# Patient Record
Sex: MALE | Race: WHITE
[De-identification: names, ages, dates, MRNs, and addresses within clinical notes are randomized per-mention and may not be internally consistent; named-entity substitution may affect disease eponyms.]

---

## 2017-03-19 ENCOUNTER — HOSPITAL ENCOUNTER (EMERGENCY)
Dept: HOSPITAL 25 - ED | Age: 82
Discharge: HOME | End: 2017-03-19
Payer: MEDICARE

## 2017-03-19 VITALS — SYSTOLIC BLOOD PRESSURE: 140 MMHG | DIASTOLIC BLOOD PRESSURE: 58 MMHG

## 2017-03-19 DIAGNOSIS — E78.5: ICD-10-CM

## 2017-03-19 DIAGNOSIS — S22.42XA: Primary | ICD-10-CM
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
## 2017-03-19 DIAGNOSIS — E03.9: ICD-10-CM

## 2017-03-19 DIAGNOSIS — R05: ICD-10-CM
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
## 2017-03-19 PROCEDURE — 96372 THER/PROPH/DIAG INJ SC/IM: CPT

## 2017-03-19 PROCEDURE — 99282 EMERGENCY DEPT VISIT SF MDM: CPT

## 2017-03-19 NOTE — ED
Jem GOMEZ Matthew, scribed for Golden Lopez MD on 03/19/17 at 1247 .





HPI Chest Pain





- HPI Summary


HPI Summary: 


An 84 y/o male presents to the ED with left lower rib pain. The pain is rated 8/

10 in severity with cough and 2/10 without movement. The pain is described as 

sharp. He states that his pain started after he had a coughing episode. 

Associated symptoms include mildly productive cough. The patient denies SOB, 

dizziness, and mid-sternal chest pain. No Hx of COPD or asthma. 

















- History of Current Complaint


Chief Complaint: EDChestWallPain


Time Seen by Provider: 03/19/17 12:38


Hx Obtained From: Patient


Onset/Duration: Atraumatic, Still Present


Initial Severity: Moderate


Current Severity: Moderate


Pain Intensity: 8 - w/ cough 


Pain Scale Used: 0-10 Numeric


Character: Sharp/Stabbing


Aggravating Factor(s): Movement, Other: - Cough


Alleviating Factor(s): Rest


Associated Signs and Symptoms: Positive: Cough - mildly productive.  Negative: 

Dizziness, Shortness of Breath





- Additional Pertinent History


Primary Care Physician: [id number]





- Allergy/Home Medications


Allergies/Adverse Reactions: 


 Allergies











Allergy/AdvReac Type Severity Reaction Status Date / Time


 


Iodinated Contrast Media AdvReac Unknown Headache Verified 01/20/16 17:49





[IV CONTRAST DYE]     


 


Statins AdvReac Unknown Muscle Pain Verified 01/20/16 17:49














PMH/Surg Hx/FS Hx/Imm Hx


Endocrine/Hematology History: Reports: Hx Anticoagulant Therapy, Hx Thyroid 

Disease - hypothyroidism, Other Endocrine/Hematological Disorders - dyslipidemia


   Denies: Hx Diabetes


Cardiovascular History: Reports: Hx Cardiac Arrest, Hx Coronary Artery Disease, 

Hx Hypercholesterolemia, Hx Hypertension, Hx Peripheral Vascular Disease, Hx 

Valvular Heart Disease - moderate aortic stenosis, Other Cardiovascular Problems

/Disorders - two stents in 1999


   Denies: Hx Pacemaker/ICD


Respiratory History: Reports: Hx Seasonal Allergies


   Denies: Hx Chronic Obstructive Pulmonary Disease (COPD)


   Comment Only: Hx Asthma - adolescent


GI History: Reports: Hx Gastroesophageal Reflux Disease, Other GI Disorders - 

colitis


 History: 


   Denies: Hx Dialysis


Musculoskeletal History: Reports: Hx Arthritis, Hx Back Problems, Hx Orthopedic 

Injury, Hx Osteoporosis, Other Musculoskeletal History


Sensory History: Reports: Hx Contacts or Glasses


   Denies: Hx Cataracts, Hx Eye Injury, Hx Eye Prosthesis, Hx Glaucoma, Hx 

Macular Degeneration, Hx Vision Problem, Hx Deafness, Hx Hearing Aid, Hx 

Hearing Problem


Opthamlomology History: Reports: Hx Contacts or Glasses


   Denies: Hx Cataracts, Hx Eye Injury, Hx Eye Prosthesis, Hx Glaucoma, Hx 

Macular Degeneration, Hx Vision Problem


Neurological History: 


   Denies: Hx Dementia, Hx Seizures


Psychiatric History: 


   Denies: Hx Panic Disorder





- Cancer History


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 

in 1989





- Surgical History


Surgery Procedure, Year, and Place: BILAT SHOULDERS(WITH SCREWS), RT HIP 

REPLACEMENT 11/9/12, CARDIAC STENTS X 2 1999(report in pt's chart - other 

facilty reports - MULTILINK DUET - COND 5 UP TO 3T) , DOUBLE BYPASS 2005, 

TRIGGER FINGERS RIGHT AND LEFT , APPENDECTOMY, bilateral carpal tunnel releases,

CATARACT


Hx Anesthesia Reactions: No





- Immunization History


Date of Tetanus Vaccine: Up to date


Date of Influenza Vaccine: Fall 2014


Infectious Disease History: No


Infectious Disease History: 


   Denies: Traveled Outside the [place] in Last 30 Days





- Family History


Known Family History: Positive: Other - colon cancer, and perforated bowel.





- Social History


Alcohol Use: Daily


Alcohol Amount: 1/2 glass wine with supper


Substance Use Type: Reports: None


Smoking Status (MU): Never Smoked Tobacco





Review of Systems


Constitutional: Negative


Eyes: Negative


ENT: Negative


Positive: Chest Pain - left lower ribs; NO mid sternal chest pain


Positive: Cough - mildly productive .  Negative: Shortness Of Breath


Gastrointestinal: Negative


Genitourinary: Negative


Musculoskeletal: Negative


Skin: Negative


Neurological: Negative - NO DIZZINESS


Psychological: Normal


All Other Systems Reviewed And Are Negative: Yes





Physical Exam





- Summary


Physical Exam Summary: 





VITAL SIGNS: Reviewed. 


GENERAL: Patient is a well developed and nourished male who is lying 

comfortable in the stretcher. Patient is not in any acute respiratory distress. 


HEAD AND FACE: No signs of trauma. No ecchymosis, hematomas or skull 

depressions.  


EYES: PERRLA, EOMI x 2, 


EARS: Hearing grossly intact. 


MOUTH: Oropharynx within normal limits. 


NECK: Supple, trachea is midline, no adenopathy, no JVD, no carotid bruit


CHEST: Symmetric, Positive tenderness at palpation at the level of 7th and 8th 

rib mid axillary area. 


LUNGS: Clear to auscultation bilaterally. No wheezing or crackles.


CVS: Regular rate and rhythm, S1 and S2 present, no murmurs or gallops 

appreciated. 


ABDOMEN: Soft, non-tender. No signs of distention. No rebound no guarding, and 

no masses palpated. Bowel sounds are normal. 


EXTREMITIES: FROM in all major joints, no edema, no cyanosis or clubbing.


NEURO: Alert and oriented x 3. No acute neurological deficits. Speech is normal 

and follows commands. 


SKIN: Dry and warm








Triage Information Reviewed: Yes


Vital Signs On Initial Exam: 


 Initial Vitals











Temp Pulse Resp BP Pulse Ox


 


 98.5 F   60   16   145/58   100 


 


 03/19/17 12:25  03/19/17 12:25  03/19/17 12:25  03/19/17 12:25  03/19/17 12:25











Vital Signs Reviewed: Yes





Diagnostics





- Vital Signs


 Vital Signs











  Temp Pulse Resp BP Pulse Ox


 


 03/19/17 12:25  98.5 F  60  16  145/58  100














- Laboratory


Lab Statement: Any lab studies that have been ordered have been reviewed, and 

results considered in the medical decision making process.





- Radiology


  ** Rib/Chest XR


Xray Interpretation: Positive (See Comments) - IMPRESSION: Subtle contour 

irregularities at the lateral LEFT fourth , sixth, and seventh ribs noted which 

may reflect nondisplaced fractures. No callus formation evident to favor 

healing or chronic fractures. Negative for pneumothorax.


Radiology Interpretation Completed By: Radiologist





Chest Pain Course/Dx





- Course


Assessment/Plan: An 84 y/o male presents to the ED with left lower rib pain. 

The pain is rated 8/10 in severity with cough and 2/10 without movement. The 

pain is described as sharp. He states that his pain started after he had a 

coughing episode. Associated symptoms include mildly productive cough. The 

patient denies SOB, dizziness, and mid-sternal chest pain. No Hx of COPD or 

asthma. XR of the ribs shows non displaced fracture of 4,6,and 7th ribs. No 

evidence of pneumothorax. In the ED course, the patient was given one Percocet 

and Toradol and his symptoms improved. The patient denies any prescriptions for 

narcotics. He only wants to take ibuprofen and Tylenol for the pain. I dont 

believe the patient has ACS since the patient doesnt have chest pain, SOB, 

nausea, vomiting, or diaphoresis. I believe his symptoms are secondary to the 

rib fractures. Hes hemodynamically stable and A&Ox3. He will be given an 

incentive spirometer.  The patient and his wife are confortable going home.  I 

discussed all the findings and test results with the patient. Patient was 

instructed to return to the emergency room immediately if any of the symptoms 

return or worsens. Plan of care was discussed with the patient and understands 

and agrees. All questions were answered at patient satisfaction.  There were no 

further complaints or concerns.  Lung exam before discharge: CTA B/L. Good air 

exchange. No wheezing or crackles heard. CVS: S1 and S2 present. No murmurs 

appreciated. Patient is alert and oriented x 3. Patient is hemodynamically 

stable. Patient will be discharged home with follow up pediatrician in the next 

2-3 days





- Chest Pain


Differential Diagnosis/HQI/PQRI: Chest Wall, Other: - Rib fracture, rib 

contusion





- Diagnoses


Provider Diagnoses: 


 Rib fractures








Discharge





- Discharge Plan


Condition: Stable


Disposition: HOME


Patient Education Materials:  Rib Fracture (ED)


Referrals: 


Tab Sadler MD [Primary Care Provider] - 3 Days


Additional Instructions: 


Please follow-up with your primary care physician. 





The documentation as recorded by the Jem jacobs Matthew accurately 

reflects the service I personally performed and the decisions made by me, Golden Lopez MD.

## 2017-03-19 NOTE — RAD
Indication: Cough for weeks. Shortness of breath. LEFT low lateral rib cage pain.



Comparison: December 26, 2016



Technique: 4 view LEFT rib series.



Report: Subtle contour irregularities at the lateral LEFT fourth , sixth, and seventh ribs

noted which may reflect nondisplaced fractures. No callus formation evident to favor

healing or chronic fractures. No alveolar consolidation to indicate pneumonia. Negative

for pleural effusion or pneumothorax. Median sternotomy wires and mediastinal vascular

clips. Upper normal heart size. Unremarkable central pulmonary vasculature.



IMPRESSION: Subtle contour irregularities at the lateral LEFT fourth , sixth, and seventh

ribs noted which may reflect nondisplaced fractures. No callus formation evident to favor

healing or chronic fractures. Negative for pneumothorax.

## 2017-03-31 ENCOUNTER — HOSPITAL ENCOUNTER (EMERGENCY)
Dept: HOSPITAL 25 - ED | Age: 82
Discharge: HOME | End: 2017-03-31
Payer: MEDICARE

## 2017-03-31 VITALS — SYSTOLIC BLOOD PRESSURE: 128 MMHG | DIASTOLIC BLOOD PRESSURE: 72 MMHG

## 2017-03-31 DIAGNOSIS — Z79.01: ICD-10-CM
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
## 2017-03-31 DIAGNOSIS — S62.600B: Primary | ICD-10-CM
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
## 2017-03-31 PROCEDURE — 99282 EMERGENCY DEPT VISIT SF MDM: CPT

## 2017-03-31 PROCEDURE — 73140 X-RAY EXAM OF FINGER(S): CPT

## 2017-03-31 PROCEDURE — 90471 IMMUNIZATION ADMIN: CPT

## 2017-03-31 PROCEDURE — 90715 TDAP VACCINE 7 YRS/> IM: CPT

## 2017-03-31 NOTE — ED
Upper Extremity Pain





- HPI Summary


HPI Summary: 


Patient presents after his right small finger came into contact with a metal 

 he was working with. He had immediate pain and bleeding. He applied 

direct pressure to control bleeding. His tetanus is not up to date. He has 

sensation in the digit. He is right handed. He is on blood thinners.





- History of Current Complaint


Hx Obtained From: Patient


Mechanism Of Injury: Unknown - metal circular 


Onset/Duration: Started Minutes Ago


Timing: Constant


Severity Initially: Severe


Severity Currently: Severe


Pain Location: Finger - right small


Character: Sharp, Aching


Aggravating Factor(s): Movement


Alleviating Factor(s): Nothing


Associated Signs & Symptoms: Positive: Swelling


Related History: Dominant Hand Right





<Herman Blank - Last Filed: 03/31/17 15:07>





<Janie Arzola - Last Filed: 03/31/17 15:21>





- History of Current Complaint


Chief Complaint: EDExtremityUpper


Stated Complaint: HURT PINKY FINGER


Time Seen by Provider: 03/31/17 12:22





- Allergies/Home Medications


Allergies/Adverse Reactions: 


 Allergies











Allergy/AdvReac Type Severity Reaction Status Date / Time


 


Iodinated Contrast Media AdvReac Unknown Headache Verified 01/20/16 17:49





[IV CONTRAST DYE]     


 


Statins AdvReac Unknown Muscle Pain Verified 01/20/16 17:49














PMH/Surg Hx/FS Hx/Imm Hx


Endocrine/Hematology History: Reports: Hx Anticoagulant Therapy, Hx Thyroid 

Disease - hypothyroidism, Other Endocrine/Hematological Disorders - dyslipidemia


   Denies: Hx Diabetes


Cardiovascular History: Reports: Hx Cardiac Arrest, Hx Coronary Artery Disease, 

Hx Hypercholesterolemia, Hx Hypertension, Hx Peripheral Vascular Disease, Hx 

Valvular Heart Disease - moderate aortic stenosis, Other Cardiovascular Problems

/Disorders - two stents in 1999


   Denies: Hx Pacemaker/ICD


Respiratory History: Reports: Hx Seasonal Allergies


   Denies: Hx Chronic Obstructive Pulmonary Disease (COPD)


   Comment Only: Hx Asthma - adolescent


GI History: Reports: Hx Gastroesophageal Reflux Disease, Other GI Disorders - 

colitis


 History: 


   Denies: Hx Dialysis


Musculoskeletal History: Reports: Hx Arthritis, Hx Back Problems, Hx Orthopedic 

Injury, Hx Osteoporosis, Other Musculoskeletal History


Sensory History: Reports: Hx Contacts or Glasses


   Denies: Hx Cataracts, Hx Eye Injury, Hx Eye Prosthesis, Hx Glaucoma, Hx 

Macular Degeneration, Hx Vision Problem, Hx Deafness, Hx Hearing Aid, Hx 

Hearing Problem


Opthamlomology History: Reports: Hx Contacts or Glasses


   Denies: Hx Cataracts, Hx Eye Injury, Hx Eye Prosthesis, Hx Glaucoma, Hx 

Macular Degeneration, Hx Vision Problem


Neurological History: 


   Denies: Hx Dementia, Hx Seizures


Psychiatric History: 


   Denies: Hx Panic Disorder





- Cancer History


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 

in 1989





- Surgical History


Surgery Procedure, Year, and Place: BILAT SHOULDERS(WITH SCREWS), RT HIP 

REPLACEMENT 11/9/12, CARDIAC STENTS X 2 1999(report in pt's chart - other 

facilty reports - MULTILINK DUET - COND 5 UP TO 3T) , DOUBLE BYPASS 2005, 

TRIGGER FINGERS RIGHT AND LEFT , APPENDECTOMY, bilateral carpal tunnel releases,

CATARACT


Hx Anesthesia Reactions: No





- Immunization History


Date of Tetanus Vaccine: Up to date


Date of Influenza Vaccine: Fall 2014


Infectious Disease History: 


   Denies: Traveled Outside the [place] in Last 30 Days





- Family History


Known Family History: Positive: Other - colon cancer, and perforated bowel.





- Social History


Occupation: Retired


Lives: With Family


Alcohol Use: Daily


Alcohol Amount: 1/2 glass wine with supper


Substance Use Type: Reports: None


Smoking Status (MU): Never Smoked Tobacco





<Herman Blank - Last Filed: 03/31/17 15:07>





Review of Systems


Positive: Myalgia, Decreased ROM, Edema - mild


Negative: Weakness, Paresthesia, Numbness


All Other Systems Reviewed And Are Negative: Yes





<Herman Blank - Last Filed: 03/31/17 15:07>





Physical Exam


Triage Information Reviewed: Yes


Vital Signs On Initial Exam: 


 Initial Vitals











Temp Pulse Resp BP Pulse Ox


 


 97.4 F   81   16   141/98   100 


 


 03/31/17 12:17  03/31/17 12:17  03/31/17 12:17  03/31/17 12:17  03/31/17 12:17











Vital Signs Reviewed: Yes


Appearance: Positive: Well-Appearing, Well-Nourished, Pain Distress


Skin: Positive: Warm, Skin Color Reflects Adequate Perfusion, Dry, Tender - 

dime sized area of avulsed tissue with 3mm indention to dorsal aspect of distal 

right 5th phalanx at the base of the nail, Soft


Head/Face: Positive: Normal Head/Face Inspection


Eyes: Positive: EOMI, JAYSON, Conjunctiva Clear


ENT: Positive: Hearing grossly normal


Respiratory/Lung Sounds: Positive: Breath Sounds Present


Cardiovascular: Positive: RRR


Musculoskeletal: Positive: Limited @ - right small digit DIP joint limited due 

to pain, Pain @ - dime sized area of avulsed tissue with 3mm indention to 

dorsal aspect of distal right 5th phalanx at the base of the nail, Edema Right


Neurological: Positive: Sensory/Motor Intact, Alert, Oriented to Person Place, 

Time, NV Bundle Intact Distally


Psychiatric: Positive: Affect/Mood Appropriate


AVPU Assessment: Alert





<Herman Blank - Last Filed: 03/31/17 15:07>


Vital Signs On Initial Exam: 


 Initial Vitals











Temp Pulse Resp BP Pulse Ox


 


 97.4 F   81   16   141/98   100 


 


 03/31/17 12:17  03/31/17 12:17  03/31/17 12:17  03/31/17 12:17  03/31/17 12:17














<Janie Arzola - Last Filed: 03/31/17 15:21>





Procedures





- Splinting


Location: right small finger


Pre-Made Type: metal - foam splint


Splint: volar


Pre-Proc Neuro Vasc Exam: normal


Post-Proc Neuro Vasc Exam: normal





<Herman Blank - Last Filed: 03/31/17 15:07>





Diagnostics





- Vital Signs


 Vital Signs











  Temp Pulse Resp BP Pulse Ox


 


 03/31/17 12:17  97.4 F  81  16  141/98  100














- Radiology


  ** No standard instances


Xray Interpretation: Positive (See Comments)


Radiology Interpretation Completed By: Radiologist - right 5th digit distal 

phalanx with fracture pattern





<Herman Blank - Last Filed: 03/31/17 15:07>





- Vital Signs


 Vital Signs











  Temp Pulse Resp BP Pulse Ox


 


 03/31/17 14:51  97.8 F  78  16  148/81  97


 


 03/31/17 12:17  97.4 F  81  16  141/98  100














<Janie Arzola - Last Filed: 03/31/17 15:21>





Course/Dx





- Course


Course Of Treatment: Patient wound was well irrigated





- Diagnoses


Differential Diagnosis/HQI/PQRI: Positive: Arthritis, Bursitis, Fracture (Open)

, Fracture (Closed), Hematoma, Laceration, Strain, Sprain





- Physician Notifications


Discussed Care Of Patient With: Dr. Arzola, ED attending; Dr. Dan, 

orthopedic surgeon.





<Herman Blank - Last Filed: 03/31/17 15:07>





<Janie Arzola - Last Filed: 03/31/17 15:21>





- Diagnoses


Provider Diagnoses: 


 Open fracture of phalanx of right index finger








Discharge





<Herman Blank - Last Filed: 03/31/17 15:07>





<Janie Arzola - Last Filed: 03/31/17 15:21>





- Discharge Plan


Condition: Stable


Disposition: HOME


Prescriptions: 


Cephalexin CAP* [Keflex CAP*] 500 mg PO QID #39 cap


Patient Education Materials:  Finger Fracture (ED)


Referrals: 


Tab Sadler MD [Primary Care Provider] - 


Cris Dan MD [Medical Doctor] - 


Additional Instructions: 


Please take the antibiotics prescribed until they are completely gone. Use 

Tylenol for pain. Keep your splint clean, dry and in place at all times. 

Elevate your hand above your heart to reduce swelling. 


Call Dr. Dan's office today for an appointment Monday to be seen for 

evaluation. Return to the emergency department if symptoms worsen.


Be aware that antibiotics can make your Warfarin have more of an affect, so you 

need to speak with the provider who regulates your dosing so they are aware of 

any needed changes.





Attestations


User Type: Provider - I was available for consult. This patient was seen by the 

XIN. The patient was not presented to, seen by, or examined by me. Ernestoj





<Janie Arzola - Last Filed: 03/31/17 15:21>

## 2017-03-31 NOTE — RAD
INDICATION: Crush injury right fifth digit     



COMPARISON: None



TECHNIQUE: AP, lateral, and oblique views were obtained.



FINDINGS: There is fracture/deformity of the distal phalanx with associated soft tissue

injury. No other fractures are evident. There is underlying degenerative arthropathy.



IMPRESSION: FRACTURE/DEFORMITY OF THE DISTAL PHALANX WITH ASSOCIATED SOFT TISSUE INJURY.

## 2017-06-12 NOTE — HP
CC:  Tab Sadler MD; Jaquelin Prado MD *

 

ADMISSION HISTORY AND PHYSICAL:

 

DATE OF ADMISSION:  06/14/17

 

SURGEON:  Dr. Murray (DICTATED BY CLAUDE JACKSON)



CHIEF COMPLAINT:  Left inguinal hernia.

 

HISTORY OF PRESENT ILLNESS:  This is an 83-year-old male who is referred for 
evaluation of a left inguinal hernia.  This was a finding on recent MRI of the 
left hip from 05/22/17.  That study showed a direct left inguinal hernia 
measuring up to 3.9 x 4.1 x 10.5 cm hernia containing a loop of nonobstructed 
sigmoid colon, as well as the additional orthopedic findings (see separate 
report).  The patient states that he had not really noticed any swelling in the 
groin but had noticed of late some increased difficulty with his bowels, i.e., 
smaller rounded stools and at times requiring manual pressure in the left 
groin.  He denies any symptoms to suggest incarceration or strangulation.  He 
denies any recent changes in genitourinary symptoms.  He was seen in the office 
by Dr. Murray on 05/25/17, at which time, examination confirmed the presence of 
an obvious left inguinal bulge that was reducible and nontender.  There was no 
hernia noted on the right.  The testes were otherwise normal.  Dr. Murray 
discussed with him the indications for surgery, the risks, benefits and 
alternatives.  Initially, the patient had been scheduled as a laparoscopic 
approach, but because of his attendant medical issues, it was felt best to use 
an open approach under local anesthesia with IV sedation. The patient is 
agreeable.  Also, of note, he had not been instructed to stop his Coumadin 5 
days before surgery.  Therefore, he will be given a single dose of vitamin K 5 
mg p.o. today, 06/12/17, with INR check early the morning of surgery, 06/14/17.

 

PAST MEDICAL HISTORY:  Coronary artery disease (MI in 1999) with PCA and 2 
stent placements at that time, then subsequent 2-vessel CABG in 2005.  (See 
below for review of systems).  Moderate aortic stenosis, peripheral vascular 
disease (moderate carotid artery stenosis), history of ischemic colitis (many 
years ago with no surgical intervention required and no recent problems reported
), GERD, chronic pain largely related to his left hip and left knee.  He has 
avascular necrosis of the left femoral head and is anticipating a possible left 
hip replacement in the near future, though possibly also preceded by left knee 
replacement.  He also suffers from insomnia and vertigo.  He is also treated 
for hypothyroidism.  Antiphospholipid antibody syndrome (the patient states 
that this is the indication for his chronic anticoagulation).

 

PAST SURGICAL HISTORY:  Open appendectomy, bilateral rotator cuff repair, 2-
vessel CABG (see above), right total hip arthroplasty 2012, umbilical hernia 
repair, bilateral carpal tunnel release, trigger finger release.  No reported 
surgical or anesthesia complications.

 

CURRENT MEDICATIONS:

1.  Vitamin K 5 mg single dose on 06/12/17.

2.  Coumadin 5 mg one tablet alternating with one half tablet daily (last dose 
2.5 mg on 06/11/17).

3.  Temazepam 15 mg one half or one tablet at h.s. p.r.n. insomnia (has been 
using one half tablet routinely at h.s.).

4.  Colestid 1 g 3 tablets p.o. b.i.d.

5.  Fenofibrate 145 mg daily.

6.  Fish oil 1000 mg 2 tablets b.i.d.

7.  Lasix 20 mg daily.

8.  Neurontin 300 mg t.i.d.

9.  Omeprazole 20 mg daily.

10.  Meclizine 25 mg 4 times a day p.r.n. (uses 4 times a day routinely).

11.  Levothyroxine 125 mcg daily.

12.  Guaifenesin 400 mg 2 tablets b.i.d.

13.  Coenzyme Q10 100 mg daily.

14.  Vitamin C 500 mg 4 tablets b.i.d.

15.  MiraLax 17 g daily.

16.  Vitamin B12 1000 mcg daily.

17.  Biotin 5000 mcg daily.

18.  Vitamin D3 5000 IU daily.

19.  Melatonin 10 mg q.h.s.

20.  Multivitamin daily.

21.  Amoxicillin 500 mg 4 tablets 1 hour prior to dental work.

22.  Ibuprofen 200 mg daily.

23.  Voltaren topical gel t.i.d. p.r.n.

24.  Oxycodone liquid concentrate 20 mg/mL 0.25 mL 4 times a day p.r.n. (has 
only used a few times).

25.  He is also just finishing a 10-day course of penicillin 250 mg 4 times a 
day for dental abscess.

 

ALLERGIES:  NIACIN (flushing), ZOCOR (elevated CPK), BETA-BLOCKERS (bradycardia)
, IV CONTRAST DYE (headaches).

 

FAMILY HISTORY:  No family history of anesthesia problems, bleeding or clotting 
disorders.

 

SOCIAL HISTORY:  The patient is .  His wife has advanced Alzheimer's 
disease.  They do have a home health aide daily.  He also presents today with 
his daughter who is supportive.  He denies tobacco use.  He drinks one half 
glass of wine daily.  He denies other recreational drug use.

 

REVIEW OF SYSTEMS:  General:  No recent constitutional symptoms or acute 
illnesses other than described in the HPI.  HEENT:  No changes in vision.  He 
is status post bilateral cataract extraction.  He does have upper and lower 
permanent bridges. Cardiovascular:  As above.  See also Dr. Prado's note from 
06/08/17.  Most recent echocardiogram from 

12/23/16 shows EF of 55% to 60%, diastolic dysfunction and moderate aortic 
stenosis.  Most recent stress test from 

12/27/16 showed a fixed inferior wall defect and ejection fraction of 55% and 
no evidence for ischemia. Respiratory:  No shortness of breath, chronic cough.  
GI:  No additional problems reported other than per the HPI.  He has undergone 
colonoscopy, but last study was about 10 years ago with no recommended 
followup.  :  Nocturia x2.  Otherwise, no new or acute symptoms.  Endocrine:  
He is treated for hypothyroidism.  No history of diabetes.  Musculoskeletal:  
As above.  No additions.

 

                               PHYSICAL EXAMINATION

 

GENERAL:  Well-nourished elderly male, in no acute distress.  He ambulates with 
2 canes, but is able to manage to get up and down from the exam table.

 

VITAL SIGNS:  Height 66.5 inches, weight 176 pounds.  Blood pressure 120/70, 
pulse 74, respirations 16.

 

HEENT:  Pupils are equal, round, and reactive.  EOMs intact.  No conjunctival 
pallor.  Oropharynx:  Teeth in good repair.  No intraoral lesions.

 

NECK:  No thyromegaly or masses.  No cervical or supraclavicular 
lymphadenopathy.

 

LUNGS:  Clear to auscultation.  No rales or wheezes.

 

HEART:  Regular rate and rhythm.  He does have a systolic murmur heard best at 
the right second interspace consistent with known AS.

 

ABDOMEN:  Well-healed surgical scars.  Soft.  Nontender to palpation.  No 
palpable masses or organomegaly with the exception of the left inguinal hernia 
as noted above per Dr. Murray's exam.

 

GENITALIA:  Not repeated.

 

RECTAL:  Not done.

 

BACK:  No spinous process or CVA tenderness.

 

EXTREMITIES:  Trace edema, right slightly greater than left.

 

NEUROLOGICAL:  Grossly intact.

 

SKIN:  Warm and dry.  No suspicious rashes or lesions.

 

 IMPRESSION:  Left inguinal hernia.

 

PLAN:  Open repair, left inguinal hernia with mesh.  Please see above for 
specifics regarding his Coumadin regimen, vitamin K and INR for the morning of 
surgery.

 

 ____________________________________ CLAUDE JACKSON

 

[id number]/[facility] #: [id number]

[facility]

## 2017-06-14 ENCOUNTER — HOSPITAL ENCOUNTER (OUTPATIENT)
Dept: HOSPITAL 25 - OR | Age: 82
Discharge: HOME | End: 2017-06-14
Attending: SURGERY
Payer: MEDICARE

## 2017-06-14 VITALS — SYSTOLIC BLOOD PRESSURE: 140 MMHG | DIASTOLIC BLOOD PRESSURE: 67 MMHG

## 2017-06-14 DIAGNOSIS — Z79.01: ICD-10-CM

## 2017-06-14 DIAGNOSIS — K40.90: Primary | ICD-10-CM

## 2017-06-14 DIAGNOSIS — I34.8: ICD-10-CM

## 2017-06-14 DIAGNOSIS — E03.9: ICD-10-CM

## 2017-06-14 DIAGNOSIS — Z95.1: ICD-10-CM

## 2017-06-14 DIAGNOSIS — I25.10: ICD-10-CM

## 2017-06-14 DIAGNOSIS — I35.0: ICD-10-CM

## 2017-06-14 PROCEDURE — C1781 MESH (IMPLANTABLE): HCPCS

## 2017-06-14 RX ADMIN — FENTANYL CITRATE PRN MCG: 0.05 INJECTION, SOLUTION INTRAMUSCULAR; INTRAVENOUS at 14:38

## 2017-06-14 RX ADMIN — FENTANYL CITRATE PRN MCG: 0.05 INJECTION, SOLUTION INTRAMUSCULAR; INTRAVENOUS at 14:31

## 2017-06-14 NOTE — SURGPN
Brief Operative Note





- Surgery


Procedures: 


Procedures





Pre-OP Diagnoses:   Left inguinal hernia





Post-op Diagnosis:   same





Procedure:      open Left inguinal hernia repair with mesh





Surgeon:       Trevor





Asst:       Joseph





Anethesia:       Local MAC  Dr Cook





EBL:      minimal





IVF:      600cc crystalloid





Specimen:      none





Drains:      none

## 2017-06-15 NOTE — OP
CC:  Dr. Tab Sadler; Dr. Jaquelin Prado; [facility] *

 

DATE OF OPERATION:  06/14/17 - [facility]

 

DATE OF BIRTH:  [birth year]

 

SURGEON:  Mick Murray MD

 

ASSISTANT:  CLAUDE Hurt

 

ANESTHESIOLOGIST:  Dr. Cook.

 

ANESTHESIA:  General anesthesia with LMA.

 

PRE-OP DIAGNOSIS:  Left inguinal hernia.

 

POST-OP DIAGNOSIS:  Left inguinal hernia.

 

OPERATIVE PROCEDURE:  Open left inguinal hernia repair with mesh.

 

ESTIMATED BLOOD LOSS:  Minimal blood loss.

 

IV FLUIDS:  600 cc of crystalloid fluid given.

 

SPECIMEN:  None.

 

DRAINS:  None.

 

DESCRIPTION OF PROCEDURE:  The patient was identified in the preoperative area, 
case discussed.  I outlined the details of an open inguinal hernia repair as 
outlined by CLAUDE Hurt.  I discussed both laparoscopic and open in 
the office prior, but the patient was felt to be high risk for general and 
endotracheal intubation and decision was made to do an open procedure.  The 
patient discussed with the anesthesiologist, and the decision was made for 
general anesthesia after review of his chart.  The patient was marked, he 
signed consent, he was taken back to the operating room, placed on the 
operating table in the supine position. Preoperative antibiotics were given.  
Sequential devices were placed on bilateral lower extremities.  The patient's 
abdomen was clipped of hair.  Right and left groin was prepped and draped in 
standard surgical fashion.  A time-out was performed.

 

An inguinal incision was made.  This was deepened down through Genie's and 
Camper's fascia right down to the external oblique aponeurosis.  This was then 
incised and flaps were made both cephalad and caudad and the spermatic cord 
isolated.  A large hernia was identified, this was freed up from the cord and 
noted to be a direct hernia.  It was easily reduced and a pocket was made to 
accomplish a Prolene Hernia System mesh.  Blunt dissection with electrocautery 
was utilized in the preperitoneal space to make an area for the mesh to unfurl.
  The spermatic cord was isolated and there was no identification of an 
indirect hernia sac.  The spermatic structures were skeletonized and no hernia 
sac was appreciated but a small lipoma and this was reduced and ligated.

 

Extended Prolene Hernia System was opened up.  It was placed in the 
preperitoneal space _____, allowed to unfurl and open up and cover the full 
space without wrinkling.  The outer leaflet was then sutured to pubic tubercle 
as well as inferiorly to the shelving edge of the inguinal ligament.  A slit 
was made in the mesh to recreate this external ______ and the mesh tail was 
tucked under the external oblique aponeurosis.  It covered full hernia defect 
and with additional 2-0 Polysorb sutures, it was tacked into place.  The 
external oblique aponeurosis was then reapproximated with running 2-0 Polysorb 
suture.  Hemostasis was excellent. The Genie's fascia was closed followed by 
skin incision with 3-0 Polysorb sutures followed by 4-0 Monocryl subcuticular 
suture.  Steri-Strips and sterile dressing were applied.  The patient tolerated 
the procedure well, was woken up, and transferred to the PACU.

 

 [id number]/[facility] #: [id number]

[facility]

## 2018-04-20 ENCOUNTER — HOSPITAL ENCOUNTER (OUTPATIENT)
Dept: HOSPITAL 25 - ED | Age: 83
Setting detail: OBSERVATION
LOS: 1 days | Discharge: HOME | End: 2018-04-21
Attending: HOSPITALIST | Admitting: INTERNAL MEDICINE
Payer: MEDICARE

## 2018-04-20 DIAGNOSIS — I50.30: ICD-10-CM

## 2018-04-20 DIAGNOSIS — D68.61: ICD-10-CM

## 2018-04-20 DIAGNOSIS — I25.2: ICD-10-CM

## 2018-04-20 DIAGNOSIS — E78.5: ICD-10-CM

## 2018-04-20 DIAGNOSIS — E03.9: ICD-10-CM

## 2018-04-20 DIAGNOSIS — Z79.899: ICD-10-CM

## 2018-04-20 DIAGNOSIS — I35.0: ICD-10-CM

## 2018-04-20 DIAGNOSIS — Z88.8: ICD-10-CM

## 2018-04-20 DIAGNOSIS — M54.9: ICD-10-CM

## 2018-04-20 DIAGNOSIS — R79.1: ICD-10-CM

## 2018-04-20 DIAGNOSIS — Z79.01: ICD-10-CM

## 2018-04-20 DIAGNOSIS — I25.10: ICD-10-CM

## 2018-04-20 DIAGNOSIS — R00.1: ICD-10-CM

## 2018-04-20 DIAGNOSIS — K21.9: ICD-10-CM

## 2018-04-20 DIAGNOSIS — I11.0: ICD-10-CM

## 2018-04-20 DIAGNOSIS — I73.9: ICD-10-CM

## 2018-04-20 DIAGNOSIS — G45.9: Primary | ICD-10-CM

## 2018-04-20 DIAGNOSIS — R94.31: ICD-10-CM

## 2018-04-20 LAB
BASOPHILS # BLD AUTO: 0 10^3/UL (ref 0–0.2)
EOSINOPHIL # BLD AUTO: 0.5 10^3/UL (ref 0–0.6)
HCT VFR BLD AUTO: 43 % (ref 42–52)
HGB BLD-MCNC: 14.3 G/DL (ref 14–18)
INR PPP/BLD: 1.89 (ref 0.77–1.02)
LYMPHOCYTES # BLD AUTO: 1.3 10^3/UL (ref 1–4.8)
MCH RBC QN AUTO: 29 PG (ref 27–31)
MCHC RBC AUTO-ENTMCNC: 33 G/DL (ref 31–36)
MCV RBC AUTO: 86 FL (ref 80–94)
MONOCYTES # BLD AUTO: 0.7 10^3/UL (ref 0–0.8)
NEUTROPHILS # BLD AUTO: 4.2 10^3/UL (ref 1.5–7.7)
NRBC # BLD AUTO: 0 10^3/UL
NRBC BLD QL AUTO: 0
PLATELET # BLD AUTO: 240 10^3/UL (ref 150–450)
RBC # BLD AUTO: 5.01 10^6/UL (ref 4–5.4)
WBC # BLD AUTO: 6.8 10^3/UL (ref 3.5–10.8)

## 2018-04-20 PROCEDURE — G0378 HOSPITAL OBSERVATION PER HR: HCPCS

## 2018-04-20 PROCEDURE — 93880 EXTRACRANIAL BILAT STUDY: CPT

## 2018-04-20 PROCEDURE — 85610 PROTHROMBIN TIME: CPT

## 2018-04-20 PROCEDURE — 80048 BASIC METABOLIC PNL TOTAL CA: CPT

## 2018-04-20 PROCEDURE — 81015 MICROSCOPIC EXAM OF URINE: CPT

## 2018-04-20 PROCEDURE — 99284 EMERGENCY DEPT VISIT MOD MDM: CPT

## 2018-04-20 PROCEDURE — 93005 ELECTROCARDIOGRAM TRACING: CPT

## 2018-04-20 PROCEDURE — 83036 HEMOGLOBIN GLYCOSYLATED A1C: CPT

## 2018-04-20 PROCEDURE — 83605 ASSAY OF LACTIC ACID: CPT

## 2018-04-20 PROCEDURE — 94760 N-INVAS EAR/PLS OXIMETRY 1: CPT

## 2018-04-20 PROCEDURE — 80053 COMPREHEN METABOLIC PANEL: CPT

## 2018-04-20 PROCEDURE — 81003 URINALYSIS AUTO W/O SCOPE: CPT

## 2018-04-20 PROCEDURE — 85025 COMPLETE CBC W/AUTO DIFF WBC: CPT

## 2018-04-20 PROCEDURE — 80307 DRUG TEST PRSMV CHEM ANLYZR: CPT

## 2018-04-20 PROCEDURE — 70551 MRI BRAIN STEM W/O DYE: CPT

## 2018-04-20 PROCEDURE — 70450 CT HEAD/BRAIN W/O DYE: CPT

## 2018-04-20 PROCEDURE — 82550 ASSAY OF CK (CPK): CPT

## 2018-04-20 PROCEDURE — 85730 THROMBOPLASTIN TIME PARTIAL: CPT

## 2018-04-20 PROCEDURE — 84484 ASSAY OF TROPONIN QUANT: CPT

## 2018-04-20 PROCEDURE — 93306 TTE W/DOPPLER COMPLETE: CPT

## 2018-04-20 PROCEDURE — 71046 X-RAY EXAM CHEST 2 VIEWS: CPT

## 2018-04-20 PROCEDURE — 87086 URINE CULTURE/COLONY COUNT: CPT

## 2018-04-20 PROCEDURE — 36415 COLL VENOUS BLD VENIPUNCTURE: CPT

## 2018-04-20 PROCEDURE — G0480 DRUG TEST DEF 1-7 CLASSES: HCPCS

## 2018-04-20 PROCEDURE — 80320 DRUG SCREEN QUANTALCOHOLS: CPT

## 2018-04-20 PROCEDURE — 80061 LIPID PANEL: CPT

## 2018-04-20 RX ADMIN — MECLIZINE HYDROCHLORIDE PRN MG: 12.5 TABLET ORAL at 21:09

## 2018-04-20 RX ADMIN — GABAPENTIN SCH: 300 CAPSULE ORAL at 21:09

## 2018-04-20 RX ADMIN — WARFARIN SODIUM SCH MG: 7.5 TABLET ORAL at 17:32

## 2018-04-20 NOTE — RAD
CPT II Codes: 3100F



INDICATION:  Right-sided numbness and vertigo



COMPARISON:  None



TECHNIQUE:  Multiple grey scale, color and doppler tracings of the common, internal and

external carotid and vertebral arteries were obtained.  Stenosis estimations reflect

velocity criteria that have been correlated to angiographic stenosis calculations based on

the distal internal carotid diameter. 



Right carotid:  



There is moderately calcified plaque within the right carotid bulb.  The peak systolic

velocity in the proximal right internal carotid artery is 60 cm/s and the maximum

end-diastolic velocity is 15 cm/s.  The peak systolic velocity in the distal common

carotid artery is 125 cm/s and the maximum end-diastolic velocity is 21 cm/s. The internal

to common carotid ratio is 0.48.  This would be consistent with a less than 50% stenosis. 





Left carotid:  



There is mildly calcified plaque within the left carotid bulb.  The peak systolic velocity

in the proximal right internal carotid artery is 71 cm/s and the maximum end-diastolic

velocity is 21 cm/s.  The peak systolic velocity in the distal common carotid artery is 79

cm/s and the maximum end-diastolic velocity is 50 cm/s. The internal to common carotid

ratio is 0.89.  This would be consistent with a less than 50% stenosis.  



Vertebrals:  There is antegrade flow in both vertebral arteries.



IMPRESSION:  There is no sonographic evidence of hemodynamically significant stenosis in

the bilateral carotid arteries.

## 2018-04-20 NOTE — RAD
INDICATION:  TIA.



COMPARISON:  Comparison is made with prior CTs of the brain from December 07, 2015 and

April 20, 2018.



TECHNIQUE: Sagittal T1, axial T1, T2, susceptibility, FLAIR and diffusion weighted images

were obtained.



FINDINGS: The ventricles, cisterns and sulci are prominent consistent with diffuse

atrophy.   There are prominent areas of increased signal intensity on T2-weighted images

present within the subcortical and periventricular white matter most consistent with

severe chronic small vessel ischemic changes. There is also increased signal intensity

within the mariama and medulla also likely secondary to chronic small vessel ischemic

changes..



No areas of restricted diffusion are present.  There is no evidence for infarct or

hemorrhage.



There is circumferential mucosal thickening within the maxillary sinuses, moderate mucosal

thickening within the ethmoid air cells and mild mucosal thickening within the frontal and

sphenoid sinuses.



IMPRESSION:  

1. NO EVIDENCE FOR ACUTE INTRACRANIAL ABNORMALITY.

2. ATROPHY AND FINDINGS SUGGESTIVE OF SEVERE CHRONIC SMALL VESSEL ISCHEMIC CHANGES.

3. FINDINGS SUGGESTIVE OF CHRONIC SINUSITIS.

## 2018-04-20 NOTE — RAD
HISTORY: Right cheek and right arm numbness, hypertension, headache



COMPARISONS: May 22, 2017



TECHNIQUE: Multiple contiguous axial CT scans were obtained of the head without 

intravenous contrast. 



FINDINGS: 

HEMORRHAGE/INFARCT: There is no hemorrhage or acute infarct.

MASSES/SHIFT: There is no mass or shift.



EXTRA-AXIAL SPACES: There are no extra-axial fluid collections.

SULCI AND VENTRICLES: The sulci and ventricles are normal in size and position for the

patient's stated age.



CEREBRUM: There is hypoattenuation of the periventricular and subcortical white matter.

BRAINSTEM: There are no focal parenchymal abnormalities.

CEREBELLUM: There are no focal parenchymal abnormalities.



VESSELS: There is calcification of the cavernous segments of the internal carotid arteries

bilaterally and of the distal vertebral arteries bilaterally.

PARANASAL SINUSES: There is mucosal thickening of the ethmoid air cells and maxillary

sinuses.

ORBITS: The orbits are unremarkable.

BONES AND SOFT TISSUE: No bone or soft tissue abnormalities are noted.



OTHER: None



IMPRESSION: 

NO ACUTE INTRACRANIAL PATHOLOGY.

CHRONIC SMALL VESSEL ISCHEMIC CHANGES.

## 2018-04-20 NOTE — CONS
CC:  Lamont Mayorga NP.

 

CONSULTATION REPORT:

 

DATE OF CONSULT:  04/20/18

 

PATIENT OF:  Dr. Sadler.

 

HISTORY OF PRESENT ILLNESS:  This is an 84-year-old man who is left handed who 
presents today with 2 episodes lasting 10 to 15 minutes of right face and arm 
numbness and the numbness in the arm is more into his pinky, but it was 
associated with right face numbness mostly around the cheek.  There was no 
weakness.  There was no numbness in the legs.  He feels back to normal now.  He 
has a significant history of hypertension, GERD, hypothyroidism, coronary 
artery disease with MI, coronary bypass x2, dyslipidemia, peripheral vascular 
disease, GERD, osteoarthritis, vertigo, ischemic colitis, spinal stenosis. He 
has had no prior strokes.

 

MEDICATIONS:  Medicines at home include:

 

1.  Amoxicillin 2000 mg once daily.

2.  Vitamin 5000 units q. daily.

3.  B12 1000 mcg daily.

4.  Lasix 20 mg q.a.m.

5.  Neurontin 300 t.i.d.

6.  Synthroid 125 mcg daily.

7.  Prilosec 20 mg daily.

8.  Polyethylene glycol 17 g p.r.n.

9.  Meclizine 25 p.r.n.

10.  800 mg of guanfacine twice a day.

11.  TriCor 145 daily.

12.  Warfarin 7.5  2 days on, one day off and those days he gets 5 mg of 
Coumadin.

13.  CoQ 200 mg daily.

 

There is no pacemaker.  He has anti-phospholipid syndrome and that is the 
reason why he is on his Coumadin.

 

REVIEW OF SYSTEMS:  Negative of all 14 spheres other than in HPI.

 

PHYSICAL EXAM:  Temperature 97.8, pulse 58, respirations 25.  Blood pressure 115
/67.  He is alert and oriented with normal speech and comprehension. Cranial 
nerves II through XII were intact.  Fundi showed sharp discs.  Motor exam 
revealed normal tone and strength.  Negative pronator drift.  Finger to nose is 
intact. Sensation is intact to light touch.  Reflexes were 1+ and equal with 
downgoing toes.  Neck:  Supple. Chest:  Clear. Cardiovascular:  Regular rate 
and rhythm. Abdomen:  Soft with positive bowel sounds.

 

DIAGNOSTIC STUDIES/LAB DATA:  I reviewed his CT scan that showed some extensive 
small vessel ischemic disease.  He has sinus bradycardia with prolonged MI 
interval.  He has normal CBC, INR 1.89, PTT 38.  He has normal CMP, LDL 
nonfasting was 98.  UA is negative.  Toxicology is negative.

 

IMPRESSION:  Wilmer had some hemisensory deficit 2 brief episodes today in his 
face and hand.  This can often represent small vessel disease and on CT scan he 
has extensive apparent white matter disease.  We will be getting an MRI scan to 
prove this. Since we do not think this large vessel, we are just getting 
carotid Doppler to screen his carotids.  He is also getting a cardiac echo.  He 
is going to continue his anticoagulation.  Fasting lipids will be checked.  He 
is being admitted overnight for observation because he has had acute transient 
ischemic attack and we will complete the workup.  Dr. Merritt will be following 
him.

 

 [id number]/[facility] #: [id number]

[facility]

## 2018-04-20 NOTE — ED
Codey GOMEZ Julia, scribed for Brianna Hamm MD on 04/20/18 at 1128 .





Neurological HPI





- HPI Summary


HPI Summary: 





This patient is a 84 year old M BIBA to [facility] with a chief complaint of sudden 

right sided inner check numbness and right facial numbness with right arm 

numbness that resolved in ten minutes. Dr. Prado's office called pt soon 

after after onset of symptoms and suggested that he come to the ED. Patient 

reports mild frontal headache worse on the right side. He rates the pain a 1 or 

2 out of 10 in severity. Patient denies any aphasia. Wifes aid was there at 

onset of symptoms and did not notice any changes in pt's speech. Pt reports INR 

of 1.4 on 4/18/18 and had increased his coumadin based on this. Medication list 

reviewed. Reviewed paperwork from Dr. Archer office. Reviewed EKG with pt; 

he states his heart rate is typically low but not this low. HR is 43 on monitor 

at time of initial encounter. Pt is not on beta blockers. Pt is a primary 

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 





- History of Current Complaint


Chief Complaint: EDNeurologicalDeficit


Stated Complaint: FACIAL NUMBNESS


Time Seen by Provider: 04/20/18 10:53


Hx Obtained From: Patient, Medical Records - from Dr. Prado's office


Onset/Duration: Sudden Onset, Started hours ago, Resolved


Timing: Sudden Onset - lasting ten minutes


Onset Severity: Moderate


Current Severity: None


Neurological Deficit Location: Facial - right, RUE


Headache Location: Frontal - right


Pain Intensity: 2


Pain Scale Used: 0-10 Numeric


Character: Numbness/Tingling - right face and RUE, Paresthesia


Aggravating: Nothing


Alleviating: Nothing


Associated Signs and Symptoms: Positive: Headache, Numbness - right facial and 

RUE


Related Hx: Anticoagulants - coumadin, Medication Comliant - compliant, Coumadin





- Additional Pertinent History


Primary Care Physician: [id number]





- Allergy/Home Medications


Allergies/Adverse Reactions: 


 Allergies











Allergy/AdvReac Type Severity Reaction Status Date / Time


 


Beta-Blockers AdvReac  See Comment Verified 04/20/18 15:09





(Beta-Adrenergic Bloc     


 


Iodinated Contrast- Oral and AdvReac  Headache Verified 04/20/18 15:09





IV Dye     


 


niacin AdvReac  Flushing Verified 04/20/18 15:07


 


simvastatin AdvReac  See Comment Verified 04/20/18 15:07


 


Statins-Hmg-Coa Reductase AdvReac  Muscle Ache Verified 04/20/18 15:09





Inhibitor     











Home Medications: 


 Home Medications





Arginine [l-Arginine] 500 mg PO DAILY 04/20/18 [History Confirmed 04/20/18]


Calcium Carbonate/Vitamin D3 [Calcium 500 + Vit D Caplet] 1 tab PO DAILY 04/20/ 18 [History Confirmed 04/20/18]


Cholecalciferol TAB* [Vitamin D TAB*] 5,000 unit PO DAILY 04/20/18 [History 

Confirmed 04/20/18]


Fenofibrate(NF) [Tricor(NF)] 145 mg PO DAILY 04/20/18 [History Confirmed 04/20/ 18]


Ferrous Sulfate TAB* 325 mg PO DAILY 04/20/18 [History Confirmed 04/20/18]


Glucosamine/MSM/Chondroitin A [Glucosamine Chondroitin &] 1 tab PO DAILY 04/20/ 18 [History Confirmed 04/20/18]


Meclizine TAB* [Antivert 12.5 TAB*] 25 mg PO BID PRN 04/20/18 [History 

Confirmed 04/20/18]


Ubidecarenone [Co Q-10] 200 mg PO DAILY 04/20/18 [History Confirmed 04/20/18]


Vitamin B Complex Vit C No.3 [B Complex/Vitamin C] 1 cap PO DAILY 04/20/18 [

History Confirmed 04/20/18]


Warfarin TAB(*) [Coumadin TAB(*)] 5 mg PO .EVERY THIRD DAY 04/20/18 [History 

Confirmed 04/20/18]


Warfarin TAB(*) [Coumadin TAB(*)] 7.5 mg PO .TWO DAYS ON ONE OFF 04/20/18 [

History Confirmed 04/20/18]











PMH/Surg Hx/FS Hx/Imm Hx


Previously Healthy: No


Endocrine/Hematology History: Reports: Hx Anticoagulant Therapy, Hx Thyroid 

Disease - hypothyroidism, Other Endocrine/Hematological Disorders - 

antiphospholipid antibody syndrome 


   Denies: Hx Diabetes


Cardiovascular History: Reports: Hx Cardiac Arrest, Hx Coronary Artery Disease, 

Hx Hypercholesterolemia, Hx Hypertension, Hx Myocardial Infarction, Hx 

Peripheral Vascular Disease, Hx Valvular Heart Disease - moderate aortic 

stenosis


   Denies: Hx Pacemaker/ICD


Respiratory History: Reports: Hx Asthma - as teen, none now, Hx Seasonal 

Allergies


   Denies: Hx Chronic Obstructive Pulmonary Disease (COPD)


GI History: Reports: Hx Gastroesophageal Reflux Disease, Other GI Disorders - 

hx ischemic colitis, no surgery


 History: 


   Denies: Hx Dialysis


Musculoskeletal History: Reports: Hx Arthritis, Hx Back Problems, Hx Orthopedic 

Injury, Hx Osteoporosis, Other Musculoskeletal History - avascular necrosis of 

left femoral head


Sensory History: Reports: Hx Contacts or Glasses - glasses


   Denies: Hx Cataracts, Hx Eye Injury, Hx Eye Prosthesis, Hx Glaucoma, Hx 

Macular Degeneration, Hx Vision Problem, Hx Deafness, Hx Hearing Aid, Hx 

Hearing Problem


Opthamlomology History: Reports: Hx Contacts or Glasses - glasses


   Denies: Hx Cataracts, Hx Eye Injury, Hx Eye Prosthesis, Hx Glaucoma, Hx 

Macular Degeneration, Hx Vision Problem


Neurological History: Reports: Other Neuro Impairments/Disorders - occas vertigo


   Denies: Hx CVA, Hx Dementia, Hx Seizures, Hx Transient Ischemic Attacks (TIA)


Psychiatric History: 


   Denies: Hx Panic Disorder





- Cancer History


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 

in 1989





- Surgical History


Surgery Procedure, Year, and Place: bilateral rotator cuff repairs, right total 

hip replacement 2012,.  cardiac stents x2, 1999, 2-vessel CABG.  bilateral 

carpal tunnel releases and trigger finger release.  open appendectomy, cataract 

surgery.  umbilical hernia repair


Hx Anesthesia Reactions: No





- Immunization History


Date of Tetanus Vaccine: Up to date


Date of Influenza Vaccine: Fall 2014


Infectious Disease History: No


Infectious Disease History: 


   Denies: Traveled Outside the [place] in Last 30 Days





- Family History


Known Family History: Positive: Other - colon cancer, and perforated bowel.





- Social History


Lives: With Family


Alcohol Use: Daily


Alcohol Amount: 1/2 glass wine with dinner


Substance Use Type: Reports: None


Smoking Status (MU): Never Smoked Tobacco





Review of Systems


Constitutional: Negative


Eyes: Negative


Cardiovascular: Negative


Respiratory: Negative


Gastrointestinal: Negative


Neurological: Negative - aphasia 


Positive: Headache, Numbness - Right facial and RUE .  Negative: Slurred Speech


Psychological: Normal


All Other Systems Reviewed And Are Negative: Yes





Physical Exam





- Summary


Physical Exam Summary: 





Appearance: well-appearing, no pain distress, Well-nourished, bradycardic 43, 

asymptomatic 


Skin: Warm, color reflects adequate perfusion,Excoriations of anterior tibia


Head: Normal Head/Face inspection


Eyes: Conjunctiva clear


ENT: Normal inspection


Neck: Supple, no nodes, no JVD.


Respiratory: Lungs clear, Normal breath sounds, no respiratory distress


Cardio: RRR, No murmur, pulses normal, brisk capillary refill


Abdomen: soft, nontender


Bowel sounds: present 


Musculoskeletal: Strength Intact/ ROM intact. No calf tenderness. 1+ bilateral 

pitting edema


Psychological: Normal


Neuro: Alert, muscle tone normal, no focal deficit, AXOx3, cranial nerves 2-12 

intact, motor 5/5, sensation intact,  reflexes absent at knee bilaterally, Heel 

to shin is normal bilaterally





Triage Information Reviewed: Yes


Vital Signs On Initial Exam: 


 Initial Vitals











Temp Pulse Resp BP Pulse Ox


 


 97.8 F   60   16   170/80   97 


 


 04/20/18 10:36  04/20/18 10:36  04/20/18 10:36  04/20/18 10:36  04/20/18 10:36











Vital Signs Reviewed: Yes





Diagnostics





- Vital Signs


 Vital Signs











  Temp Pulse Resp BP Pulse Ox


 


 04/20/18 10:36  97.8 F  60  16  170/80  97














- Laboratory


Lab Results: 


 Lab Results











  04/20/18 04/20/18 04/20/18 Range/Units





  11:35 11:35 11:35 


 


WBC    6.8  (3.5-10.8)  10^3/ul


 


RBC    5.01  (4.0-5.4)  10^6/ul


 


Hgb    14.3  (14.0-18.0)  g/dl


 


Hct    43  (42-52)  %


 


MCV    86  (80-94)  fL


 


MCH    29  (27-31)  pg


 


MCHC    33  (31-36)  g/dl


 


RDW    14  (10.5-15)  %


 


Plt Count    240  (150-450)  10^3/ul


 


MPV    7.4  (7.4-10.4)  um3


 


Neut % (Auto)    61.7  (38-83)  %


 


Lymph % (Auto)    19.5 L  (25-47)  %


 


Mono % (Auto)    10.6 H  (0-7)  %


 


Eos % (Auto)    7.6 H  (0-6)  %


 


Baso % (Auto)    0.6  (0-2)  %


 


Absolute Neuts (auto)    4.2  (1.5-7.7)  10^3/ul


 


Absolute Lymphs (auto)    1.3  (1.0-4.8)  10^3/ul


 


Absolute Monos (auto)    0.7  (0-0.8)  10^3/ul


 


Absolute Eos (auto)    0.5  (0-0.6)  10^3/ul


 


Absolute Basos (auto)    0  (0-0.2)  10^3/ul


 


Absolute Nucleated RBC    0  10^3/ul


 


Nucleated RBC %    0  


 


INR (Anticoag Therapy)  1.89 H    (0.77-1.02)  


 


APTT  38.2 H    (26.0-36.3)  seconds


 


Sodium   142   (139-145)  mmol/L


 


Potassium   4.1   (3.5-5.0)  mmol/L


 


Chloride   106   (101-111)  mmol/L


 


Carbon Dioxide   32   (22-32)  mmol/L


 


Anion Gap   4   (2-11)  mmol/L


 


BUN   18   (6-24)  mg/dL


 


Creatinine   0.90   (0.67-1.17)  mg/dL


 


Est GFR ( Amer)   103.4   (>60)  


 


Est GFR (Non-Af Amer)   80.4   (>60)  


 


BUN/Creatinine Ratio   20.0   (8-20)  


 


Glucose   82   ()  mg/dL


 


Lactic Acid     (0.5-2.0)  mmol/L


 


Calcium   9.2   (8.6-10.3)  mg/dL


 


Total Bilirubin   0.40   (0.2-1.0)  mg/dL


 


AST   23   (13-39)  U/L


 


ALT   21   (7-52)  U/L


 


Alkaline Phosphatase   58   ()  U/L


 


Troponin I   0.01   (<0.04)  ng/mL


 


Total Protein   6.0 L   (6.4-8.9)  g/dL


 


Albumin   3.6   (3.2-5.2)  g/dL


 


Globulin   2.4   (2-4)  g/dL


 


Albumin/Globulin Ratio   1.5   (1-3)  


 


Triglycerides   136   mg/dL


 


Cholesterol   167   mg/dL


 


LDL Cholesterol   98   mg/dL


 


HDL Cholesterol   41.6   mg/dL


 


Urine Color     


 


Urine Appearance     


 


Urine pH     (5-9)  


 


Ur Specific Gravity     (1.010-1.030)  


 


Urine Protein     (Negative)  


 


Urine Ketones     (Negative)  


 


Urine Blood     (Negative)  


 


Urine Nitrate     (Negative)  


 


Urine Bilirubin     (Negative)  


 


Urine Urobilinogen     (Negative)  


 


Ur Leukocyte Esterase     (Negative)  


 


Urine WBC (Auto)     (Absent)  


 


Urine RBC (Auto)     (Absent)  


 


Ur Squamous Epith Cells     (Absent)  


 


Urine Bacteria     (Absent)  


 


Urine Glucose     (Negative)  


 


Urine Ascorbic Acid     (Negative)  


 


Urine Opiates Screen     (None Detect)  


 


Ur Barbiturates Screen     (None Detect)  


 


Ur Phencyclidine Scrn     (None Detect)  


 


Ur Amphetamines Screen     (None Detect)  


 


U Benzodiazepines Scrn     (None Detect)  


 


Urine Cocaine Screen     (None Detect)  


 


U Cannabinoids Screen     (None Detect)  


 


Serum Alcohol   < 10   (<10)  mg/dL














  04/20/18 04/20/18 04/20/18 Range/Units





  11:35 11:45 11:45 


 


WBC     (3.5-10.8)  10^3/ul


 


RBC     (4.0-5.4)  10^6/ul


 


Hgb     (14.0-18.0)  g/dl


 


Hct     (42-52)  %


 


MCV     (80-94)  fL


 


MCH     (27-31)  pg


 


MCHC     (31-36)  g/dl


 


RDW     (10.5-15)  %


 


Plt Count     (150-450)  10^3/ul


 


MPV     (7.4-10.4)  um3


 


Neut % (Auto)     (38-83)  %


 


Lymph % (Auto)     (25-47)  %


 


Mono % (Auto)     (0-7)  %


 


Eos % (Auto)     (0-6)  %


 


Baso % (Auto)     (0-2)  %


 


Absolute Neuts (auto)     (1.5-7.7)  10^3/ul


 


Absolute Lymphs (auto)     (1.0-4.8)  10^3/ul


 


Absolute Monos (auto)     (0-0.8)  10^3/ul


 


Absolute Eos (auto)     (0-0.6)  10^3/ul


 


Absolute Basos (auto)     (0-0.2)  10^3/ul


 


Absolute Nucleated RBC     10^3/ul


 


Nucleated RBC %     


 


INR (Anticoag Therapy)     (0.77-1.02)  


 


APTT     (26.0-36.3)  seconds


 


Sodium     (139-145)  mmol/L


 


Potassium     (3.5-5.0)  mmol/L


 


Chloride     (101-111)  mmol/L


 


Carbon Dioxide     (22-32)  mmol/L


 


Anion Gap     (2-11)  mmol/L


 


BUN     (6-24)  mg/dL


 


Creatinine     (0.67-1.17)  mg/dL


 


Est GFR ( Amer)     (>60)  


 


Est GFR (Non-Af Amer)     (>60)  


 


BUN/Creatinine Ratio     (8-20)  


 


Glucose     ()  mg/dL


 


Lactic Acid  1.2    (0.5-2.0)  mmol/L


 


Calcium     (8.6-10.3)  mg/dL


 


Total Bilirubin     (0.2-1.0)  mg/dL


 


AST     (13-39)  U/L


 


ALT     (7-52)  U/L


 


Alkaline Phosphatase     ()  U/L


 


Troponin I     (<0.04)  ng/mL


 


Total Protein     (6.4-8.9)  g/dL


 


Albumin     (3.2-5.2)  g/dL


 


Globulin     (2-4)  g/dL


 


Albumin/Globulin Ratio     (1-3)  


 


Triglycerides     mg/dL


 


Cholesterol     mg/dL


 


LDL Cholesterol     mg/dL


 


HDL Cholesterol     mg/dL


 


Urine Color    Nohelia  


 


Urine Appearance    Cloudy  


 


Urine pH    6.0  (5-9)  


 


Ur Specific Gravity    1.012  (1.010-1.030)  


 


Urine Protein    Negative  (Negative)  


 


Urine Ketones    Negative  (Negative)  


 


Urine Blood    Negative  (Negative)  


 


Urine Nitrate    Negative  (Negative)  


 


Urine Bilirubin    Negative  (Negative)  


 


Urine Urobilinogen    Negative  (Negative)  


 


Ur Leukocyte Esterase    Trace A  (Negative)  


 


Urine WBC (Auto)    Trace(0-5/hpf)  (Absent)  


 


Urine RBC (Auto)    Absent  (Absent)  


 


Ur Squamous Epith Cells    Present A  (Absent)  


 


Urine Bacteria    Absent  (Absent)  


 


Urine Glucose    Negative  (Negative)  


 


Urine Ascorbic Acid    * A  (Negative)  


 


Urine Opiates Screen   None detected   (None Detect)  


 


Ur Barbiturates Screen   None detected   (None Detect)  


 


Ur Phencyclidine Scrn   None detected   (None Detect)  


 


Ur Amphetamines Screen   None detected   (None Detect)  


 


U Benzodiazepines Scrn   None detected   (None Detect)  


 


Urine Cocaine Screen   None detected   (None Detect)  


 


U Cannabinoids Screen   None detected   (None Detect)  


 


Serum Alcohol     (<10)  mg/dL











Result Diagrams: 


 04/20/18 11:35





 04/20/18 11:35


Lab Statement: Any lab studies that have been ordered have been reviewed, and 

results considered in the medical decision making process.





- Radiology


  ** CXR


Radiology Interpretation Completed By: Radiologist - NO ACTIVE CARDIOPULMONARY 

DISEASE. ED Physician has reviewed this report.





- CT


  ** Brain


CT Interpretation Completed By: Radiologist - NO ACUTE INTRACRANIAL PATHOLOGY. 

CHRONIC SMALL VESSEL ISCHEMIC CHANGES. ED Physician has reviewed this report.





- EKG


  ** 1124


Cardiac Rate: Bradycardia


EKG Rhythm: Sinus Bradycardia - at 43 BPM


ST Segment: Non-Specific


Ectopy: None


EKG Interpretation: first degree AV block (235), nml IVCT, nml QTc, axis -6


EKG Comparison: No Significant Change - 2/26/16





NIH Scale





- NIH Scale


Level of Consciousness: Alert/Keenly Responsive


Ask Patient the Month and His/Her Age: Both Correct


Ask Pt to Open/Close Eyes and /Release Non-Paretic Hand: Both Correctly


Best Gaze (Only Horizontal Eye Movement): Normal


Visual Field Testing: No Visual Loss


Facial Paresis-Pt to Smile & Close Eyes or Grimace Symmetry: Normal/Symmetrical


Motor Function - Right Arm: No Drift-Holds 10 Seconds


Motor Function - Left Arm: No Drift-Holds 10 Seconds


Motor Function - Right Leg: No Drift-Holds 10 Seconds


Motor Function - Left Leg: No Drift-Holds 10 Seconds


Limb Ataxia-Must be out of Proportion to Weakness Present: Absent


Sensory (Use Pinprick to Test Arms/Legs/Trunk/Face): Normal


Best Language (Describe Picture, Name Items): No Aphasia


Dysarthria (Read Several Words): Normal


Extinction and Inattention: No Abnormality


Total Score: 0





Re-Evaluation





- Re-Evaluation


  ** 1


Re-Evaluation Time: 14:05


Change: Unchanged


Comment: Pt informed of admission. Pt denies pain. No return of neuro symptoms. 

Daughter is present in room.





Course/Dx





- Course


Course Of Treatment: Pt presents with right facial and RUE numbness lasting ten 

minutes earlier today. He c/o of right frontal headache. Pt has extensive 

cardiac history. Paperwork from Dr. Prado's office is reviewed.  An EKG 

reveals first degree AV block (235), axis -6, and bradycardia, but had no 

significant change from previous EKG. Pt's INR is 1.89. A Brain CT reveals 

small chronic ischemic vessel changes. Dr. Ramirez, neurology recommends 

admission and further testing. Dr. Pettit agrees to admit. Pt's neurological 

symptoms have not returned while in ED.





- Differential Dx


Differential Diagnoses Neuro: Positive: Cerebrovascular Accident, Dysrhythmia, 

Metabolic Abnormality, Transient Ischemic Attack





- Diagnoses


Provider Diagnoses: 


 TIA (transient ischemic attack), Hypertension, poor control, Subtherapeutic 

international normalized ratio (INR), Bradycardia








Discharge





- Sign-Out/Discharge


Documenting (check all that apply): Discharge - admit





- Discharge Plan


Condition: Stable


Disposition: ADMITTED TO [facility] Billing Disposition and Condition


Condition: STABLE


[place] [place]ult


Consult: 





At 13:55, Dr. Mcallister, neurology he recommends  CTA vs carotid doppler to 

evaluate his carotids, adjust coumadin, an ECHO, and admit for observation.


At 14:03, Dr. Pettit, agrees to admit. 





The documentation as recorded by the Codey jacobs Julia accurately reflects 

the service I personally performed and the decisions made by me, Brianna Hamm MD.

## 2018-04-20 NOTE — ECHO
Patient:      RICHARD BARKER

Med Rec#:     [id number]            :          1933          

Date:         2018            Age:          84y                 

Account#:     [id number]          Height:       167.64 cm / 66.0 in

Accession#:   [id number]           Weight:       80.74 kg / 178.0 lbs

Sex:          M                     BSA:          1.9

Room#:        445                   

Admit Date#:  2018          

Type:         Inpatient

 

Referring:    Lamont Mayorga NP

Reading:      Presley Harvey MD

Sonographer:  Shari Radford ALFONZO

CC:           Jaquelin Prado MD

______________________________________________________________________

 

Transthoracic Echocardiogram

 

Indication:

TIA

BP:           148/84

HR:           66

Rhythm:       NSR

 

Findings     

History:

CAD ,s/p CABG and PCI,AS,hypothyroid,HLD,HTN,diastolic dysfunction. 

 

Technical Comments:

The study quality is good.   Completed at 1700. 

 

Left Ventricle:

The left ventricular chamber size is normal. Mild concentric left

ventricular hypertrophy is observed. Global left ventricular wall motion

and contractility are within normal limits. There is normal left

ventricular systolic function. The estimated ejection fraction is

55-60%.  Abnormal left ventricular diastolic function is observed. 

 

Left Atrium:

The left atrium is mild to moderately dilated.  

 

Right Ventricle:

The right ventricular cavity size is normal. The right ventricular

global systolic function is normal. 

 

Right Atrium:

The right atrial cavity size is normal. A patent foramen ovale is not

demonstrated with color Doppler and agitated contrast.  

 

Aortic Valve:

The aortic valve is trileaflet. The aortic valve leaflets are moderately

thickened. Systolic excursion of the aortic valve cusps is reduced.

There is no evidence of aortic regurgitation. There is moderate to

severe aortic stenosis. The highest aortic valve velocity was obtained

with the standard probe from the A5C view. 

 

Mitral Valve:

There is mitral annular calcification. The mitral valve leaflets are

moderately thickened. There is trace to mild mitral regurgitation. There

is mild mitral stenosis.  

 

Tricuspid Valve:

The tricuspid valve leaflets are normal.  There is trace to mild

tricuspid regurgitation. Unable to estimate the right ventricular

systolic pressure.   

 

Pulmonic Valve:

The pulmonic valve appears normal. There is trace to mild pulmonic

regurgitation. There is no pulmonic stenosis.  

 

Pericardium:

There is no significant pericardial effusion. 

 

Aorta:

There is no dilatation of the ascending aorta. There is no dilatation of

the aortic arch. There is no dilation of the aortic root. 

 

Pulmonary Artery:

The main pulmonary artery is not well visualized. 

 

Venous:

The venous system is not well visualized. 

 

Contrast:

Normal saline was used as contrast for the bubble study.  Intravenous

contrast was used to help determine presence of intracardiac shunting. 

 

Conclusions

There is normal left ventricular systolic function.

The estimated ejection fraction is 55-60%. 

Global left ventricular wall motion and contractility are within normal

limits.

The left ventricular chamber size is normal.

Mild concentric left ventricular hypertrophy is observed.

Abnormal left ventricular diastolic function is observed.

The left atrium is mild to moderately dilated. 

A patent foramen ovale is not seen. 

There is moderate to severe aortic stenosis (AS).

There is mild mitral stenosis (MS). 

Since the prior echocardiogram completed 17, pertinent changes are

prior AS graded moderate and prior MS not noted. 

 

Measurements     

Name                    Value         Normal Range            

RVIDd (AP) 2D           3.2 cm        (0.9 - 2.6)             

RVDdMajor (2D)          4 cm          (2.2 - 4.4)             

RAd ISD 4CH             4.7 cm        (3.4 - 4.9)             

RA (A4C)W               3.4 cm        (2.9 - 4.6)             

IVSd (2D)               1.3 cm        (0.6 - 1)               

LVPWd (2D)              1.3 cm        (0.6 - 1)               

LVIDd (2D)              4 cm          (3.6 - 5.4)             

LVIDs (2D)              2.5 cm        -                        

LV FS (2D)              38 %          (25 - 45)               

Aortic Annulus          1.9 cm        (1.4 - 2.6)             

Ao root diameter (2D)   3.3 cm        (2.1 - 3.5)             

Ascending Ao            3.1 cm        (2.1 - 3.4)             

Aortic arch             2.9 cm        (1.8 - 3.4)             

Descending Ao           0.7 cm        -                        

LA dimension (AP) 2D    5 cm          (2.3 - 3.8)             

LAd ISD 4CH             5.9 cm        (2.9 - 5.3)             

LA ISD 4CH W            4.2 cm        (2.5 - 4.5)             

 

Name                    Value         Normal Range            

LA ESV SP 4CH (A/L)     64 ml         -                        

LA ESV SP 2CH (A/L)     52 ml         -                        

LA ESV BP (A/L)         64 ml         -                        

LA ESV BP (A/L) index   33.52 ml/m2   -                        

LA ESV SP 4CH (MOD)     60 ml         -                        

LA ESV SP 2CH (MOD)     49 ml         -                        

 

Name                    Value         Normal Range            

MV E-wave Vmax          1.2 m/sec     -                        

MV deceleration time    400 msec      -                        

MV A-wave Vmax          1.4 m/sec     -                        

MV E:A ratio            0.89 ratio    -                        

LV septal e' Vmax       0.04 m/sec    -                        

LV lateral e' Vmax      0.06 m/sec    -                        

LV E:e' septal ratio    30 ratio      -                        

LV E:e' lateral ratio   20 ratio      -                        

 

Name                    Value         Normal Range            

AV Vmax                 4.1 m/sec     -                        

AV VTI                  95.8 cm       -                        

AV peak gradient        65.89 mmHg    -                        

AV mean gradient        30.39 mmHg    -                        

LVOT diameter           2 cm          -                        

LVOT Vmax               1 m/sec       -                        

LVOT VTI                24.3 cm       -                        

LVOT peak gradient      4.35 mmHg     -                        

LVOT mean gradient      2.32 mmHg     -                        

ASHLEY (continuity VTI)    0.9 cm2       -                        

 

Name                    Value         Normal Range            

MV peak gradient        7.3 mmHg      -                        

MV mean gradient        2.2 mmHg      -                        

MV PHT                  88.9 msec     -                        

MR Vmax                 1.4 m/sec     -                        

MR VTI                  53.3 cm       -                        

MVA (PHT)               2.5 cm2       -                        

MVA (continuity VTI)    1.4 cm2       -                        

 

Name                    Value         Normal Range            

PV Vmax                 1.1 m/sec     -                        

PV peak gradient        4.55 mmHg     -                        

 

Electronically signed by: Presley Harvey MD on 2018 17:37:23

## 2018-04-20 NOTE — HP
CC:  Dr. Sadler; Dr. Ramirez*

 

HISTORY AND PHYSICAL:

 

DATE OF ADMISSION:  04/20/18

 

PRIMARY CARE PROVIDER:  Dr. Sadler.

 

ATTENDING PHYSICIAN WHILE IN THE HOSPITAL:  Armani Pettit MD* (report 
dictated by Lamont Mayorga NP)

 

CHIEF COMPLAINT:  Right facial numbness inside of the mouth and right arm 
numbness.

 

HISTORY OF PRESENT ILLNESS:  Mr. Campbell is an 84-year-old male patient with 
multiple medical problems.  He had a history of CAD, aortic stenosis.  He has a 
history of MI, hypothyroidism, hyperlipidemia, hypertension, CHF, PVD, 
antiphospholipid syndrome, on Coumadin and also history of GERD and spinal 
stenosis.  He comes in to the ED today.  He states around 8:30 this morning, he 
was getting his wife's meds ready for the day and he had a dull frontal headache
, not the worst of his life.  He states it wais a dull ache.  He grabbed a 
couple of ibuprofen and instead of taking on an stomach, he had a scoop of 
yogurt with this. He noted that his inside of his mouth shortly thereafter 
about 15 to 20 minutes on the right side became numb.  He then progressed to 
have numbness down the right arm, mostly along the lateral aspect of the right 
arm, down into the 5th and 4th fingers.  He said that there was not any pain.  
He states the numbness in the arm progressed and stayed where the numbness in 
the mouth went away.  He had no facial drooping.  No trouble with speech.  He 
had no weakness to one side.  No visual disturbances associated with this and 
there was no weakness in his leg.  He said he did feel a little lightheaded.  
The headache did go away.  They were discussing this with the aide that had 
come in to help care for the patient's wife and the aide was concerned and felt 
that he should discuss this further with one of his physicians.  The phone had 
rung and actually Dr. Prado's office was on the phone for another matter and 
they explained to Dr. Prado's office what was going on and they recommended 
that he come to the ER.  By the time he had gotten to the ER, his symptoms had 
resolved.  By the time I saw him, he stated he was not having anymore symptoms.
  He denies having any chest pain or shortness of breath.  He states he has not 
had any abdominal pain or any nausea or vomiting.  He states he has known 
carotid artery disease and stenosis.  He states it is followed yearly.  He 
denied having any diarrhea.  No fevers, coughing, or chills.  He states that he 
has been taking his Coumadin.  He states last week his INR was 1.4.  He has 
been increasing the dose.  He is supposed to take 5 tonight, but again noted it 
was 1.8.  Because of the numbness in the arm and there was concern for TIA and 
with his history, we were asked to evaluate for admission.

 

PAST MEDICAL HISTORY:  Significant for:

 

1.  CAD.

2.  Aortic stenosis.

4.  MI.

5.  Hypothyroidism.

6.  Hyperlipidemia.

7.  Hypertension.

8.  CHF, diastolic.

9.  PVD.

10.  Antiphospholipid syndrome.

11.  GERD.

12.  Spinal stenosis.

 

PAST SURGICAL HISTORY:

1.  He has had a PCI.

2.  He has had CABG.

3.  He has had a left and right total hip replacement.

4.  He has had trigger finger release.

5.  Appendectomy.

6.  Carpal tunnel.

7.  Cataract extraction.

8.  Rotator cuff repair.

 

MEDICATIONS:  Home medications include:

 

1.  CoQ10 200 mg p.o. daily.

2.  Glucosamine/chondroitin 1 tablet daily.

3.  Arginine 500 mg daily.

4.  Vitamin B 1 capsule daily.

5.  Ferrous sulfate 325 mg daily.

6.  Calcium carbonate 1 tablet p.o. daily.

7.  Warfarin 5  mg alternating with 7.5 mg daily.

8.  Vitamin D 5000 units p.o. daily.

9.  TriCor 145 mg p.o. daily.

10.  Meclizine 25 mg p.o. b.i.d. as needed.

11.  Guaifenesin 800 mg p.o. b.i.d.

12.  CoQ10 400 mg in the morning.

13.  MiraLAX 17 g p.o. daily as needed.

14.  Omeprazole 20 mg daily.

15.  Multivitamin 1 tablet daily.

16.  Melatonin 2 mg at bedtime.

17.  Synthroid 125 mcg p.o. daily.

18.  Gabapentin 300 mg p.o. t.i.d.

19.  Lasix 20 mg a day.

20.  B12 1000 mcg p.o. daily.

21.  Biotin 5000 mcg p.o. daily.

22.  Vitamin C 2000 units p.o. b.i.d.

23.  Amoxicillin 2000 mg p.o. daily as needed.

 

ALLERGIES:  His allergies to medications include NIACIN, SIMVASTATIN, IV DYE, 
BETA BLOCKERS due to bradycardia.

 

FAMILY HISTORY:  His mother had colon cancer.  Father had a history of ischemic 
colitis.

 

SOCIAL HISTORY:  He does not smoke.  He does drink a glass of wine daily. 
Surrogate decision maker is his daughter.

 

REVIEW OF SYSTEMS:  There is no documented fever.  He denied having any 
significant weight change.  There was no double vision.  He denies having any 
ear discharge. There is no rhinorrhea.  There is no sore throat.  No thyroid 
enlargement.  He denies having any chest pain.  There is no orthopnea.  There 
is no nocturnal dyspnea.  He denied having any abdominal discomfort.  There was 
no nausea or vomiting.  No dysuria.  There has been no frequency.  No seizure.  
No loss of consciousness.  No pruritus and no skin ulcerations.  Review of 14 
systems was completed, all others negative.

 

                               PHYSICAL EXAMINATION

 

GENERAL:  At this time, Mr. Campbell is an 84-year-old male patient.  He is 
sitting on the ED stretcher.  He appears to be well nourished, well developed. 
Does not appear to be in any acute distress.

 

VITAL SIGNS:  Blood pressure 146/84, pulse 45, respirations 18, O2 sat 97%, 
temperature 97.8.

 

HEENT:  Head:  Atraumatic, normocephalic.  Eyes:  EOMs intact.  Sclerae 
anicteric and not pale.  Throat:  Oral mucosa appears to be moist.  No 
oropharyngeal erythema.

 

NECK:  Supple.

 

LUNGS:  Clear to auscultation bilaterally.  No wheezes, rales, or rhonchi.

 

HEART:  Sounds S1, S2.  Regular rate and rhythm.  He did have a grade 2/3 
aortic systolic murmur.

 

ABDOMEN:  Soft, flat, nontender.  Bowel sounds were present.

 

EXTREMITIES:  Pulses were 2+ throughout.  Moving all 4 extremities with 5/5 
strength.

 

NEUROLOGIC:  He is awake.  He is alert.  He is oriented x3.  His  are 
equal. Tongue is midline.  Finger-to-nose intact bilaterally.  Heel-to-shin 
intact bilaterally.  He had no gross focal deficits.

 

SKIN:  Grossly intact.

 

 LABORATORY DATA/DIAGNOSTIC STUDIES:  WBC is 6.8, RBC of 5.01, hemoglobin of 
14.3, hematocrit of 43, platelet count of 240.  INR 1.89,  PTT of 38.2.  Sodium 
142, potassium 4.1, chloride of 106, bicarb 32, BUN 18, creatinine 0.90, 
glucose 82. Lactate 1.2.  Calcium 9.2.  Total bili 0.4, AST 23, ALT 21, alk 
phos 58.  Troponin 0.01.  Albumin of 2.6.  Urine showed trace leukocyte 
esterase.  Toxicology was negative.

 

CT brain showed no acute intracranial pathology, chronic small vessel ischemic 
changes.  Chest x-ray shows no active cardiopulmonary disease.  EKG shows sinus 
bradycardia, rate of 43, no ST elevations or T-wave inversions.  Previous EKG 
showed normal sinus rhythm, rate of 79, no ST elevations or T-wave inversions 
at that point as well.  The EKG today again shows a more marked bradycardia.

 

Old medical records were reviewed.

 

ASSESSMENT AND PLAN:  Mr. Campbell is an 84-year-old male patient coming in to 
the ED today with complaints of numbness to the right face in addition to the 
right arm, now resolved.  We are asked to evaluate for admission.  He will be 
admitted under observation status for:

 

1.  Transient ischemic attack.  At this point, I did touch base with Dr. Ramirez. He will be evaluating the patient.  The plan will be for carotid 
ultrasound.  He did have a carotid bruit, right bruit, on exam, so we will get 
carotid ultrasound. In addition to this, we will get an echo with bubble study, 
MRI.  I will order lipids in the morning, A1c in the morning, and we will get 
neuro checks every 2 hours and we will place the patient on telemetry and we 
will continue to follow. Also, I have increased his Coumadin from 5 mg per day, 
he will take tonight 7.5 with repeat INR tomorrow morning to try to get him 
therapeutic.  I did ask about bridging, but at this point it was felt that we 
could hold off on this per Neurology and my attending.

2.  Coronary artery disease.  We will continue his meds as prescribed.  He is 
chest pain free.

3.  Aortic stenosis.  Follows his primary cardiologist.

4.  Hypothyroidism.  Continue Synthroid.

5.  Hypertension.  I will continue his Lasix at this point.

6.  Hyperlipidemia.  Continue meds as prescribed.

7.  Peripheral vascular disease.  He is not an aspirin at this point.  He was 
seen by Dr. Guevara previously.  We will continue his current medical regimen.

8.  History of antiphospholipid lipid syndrome.  Again, we are going to 
increase his Coumadin to try to get his INR greater than 2.

9.  Gastroesophageal reflux disease.  Continue PPI therapy.

10.  Spinal stenosis.  Continue supportive care.

11.  Congestive heart failure.  We will again diurese as needed and I am going 
to continue his Lasix.

12.  Code status:  He is a full code.

13.  Fluids, electrolytes, and nutrition:  He can have a regular diet.

 

TIME SPENT:  On admission was approximately 60 minutes, greater than half the 
time spent face-to-face with the patient obtaining my history and physical; 
other half time spent going over the plan of care with the patient and 
implementing plan of care.

 

I did discuss the plan of care with my attending, Dr. Pettit, he is in 
agreement.

 

 ____________________________________ 

LAMONT MAYORGA, TASH

 

[id number]/CPS #: [id number]

DELMIS

## 2018-04-20 NOTE — RAD
HISTORY: Numbness, resolved



COMPARISONS: December 26, 2016



VIEWS: 4: Frontal dual-energy and lateral views of the chest.



FINDINGS:

CARDIOMEDIASTINAL SILHOUETTE: The cardiomediastinal silhouette is normal.

ALEX: The alex are normal.

PLEURA: The costophrenic angles are sharp. No pleural abnormalities are noted.

LUNG PARENCHYMA: There is stable linear opacification of the right midlung suggestive of

pleuroparenchymal scarring.

ABDOMEN: The upper abdomen is clear. There is no subphrenic gas.

BONES AND SOFT TISSUES: The patient is status post median sternotomy. Mild degenerative

changes are noted.

OTHER: None.



IMPRESSION:

NO ACTIVE CARDIOPULMONARY DISEASE.

## 2018-04-21 VITALS — DIASTOLIC BLOOD PRESSURE: 65 MMHG | SYSTOLIC BLOOD PRESSURE: 126 MMHG

## 2018-04-21 LAB
BASOPHILS # BLD AUTO: 0.1 10^3/UL (ref 0–0.2)
EOSINOPHIL # BLD AUTO: 0.7 10^3/UL (ref 0–0.6)
HCT VFR BLD AUTO: 43 % (ref 42–52)
HGB BLD-MCNC: 14.6 G/DL (ref 14–18)
INR PPP/BLD: 1.86 (ref 0.77–1.02)
LYMPHOCYTES # BLD AUTO: 1.8 10^3/UL (ref 1–4.8)
MCH RBC QN AUTO: 29 PG (ref 27–31)
MCHC RBC AUTO-ENTMCNC: 34 G/DL (ref 31–36)
MCV RBC AUTO: 86 FL (ref 80–94)
MONOCYTES # BLD AUTO: 1 10^3/UL (ref 0–0.8)
NEUTROPHILS # BLD AUTO: 4.3 10^3/UL (ref 1.5–7.7)
NRBC # BLD AUTO: 0 10^3/UL
NRBC BLD QL AUTO: 0.1
PLATELET # BLD AUTO: 246 10^3/UL (ref 150–450)
RBC # BLD AUTO: 5.07 10^6/UL (ref 4–5.4)
WBC # BLD AUTO: 8 10^3/UL (ref 3.5–10.8)

## 2018-04-21 RX ADMIN — GABAPENTIN SCH MG: 300 CAPSULE ORAL at 08:54

## 2018-04-21 RX ADMIN — GABAPENTIN SCH MG: 300 CAPSULE ORAL at 13:39

## 2018-04-21 RX ADMIN — MECLIZINE HYDROCHLORIDE PRN MG: 12.5 TABLET ORAL at 09:08

## 2018-04-21 RX ADMIN — MECLIZINE HYDROCHLORIDE PRN MG: 12.5 TABLET ORAL at 13:39

## 2018-04-21 RX ADMIN — WARFARIN SODIUM SCH MG: 7.5 TABLET ORAL at 16:19

## 2018-04-21 NOTE — PN
CC:  Dr. Sadler

 

FOLLOWUP NOTE:

 

DATE OF FOLLOWUP:  04/21/18

 

PRIMARY CARE PHYSICIAN:  Tab Sadler MD.

 

OVERNIGHT EVENTS:  No acute overnight events.  The patient recounted his 
history of sensory symptoms, which is documented in Dr. Ramirez's consult as 
well as the H and P.  He described to me first right facial numbness involving 
the inside of his cheek, which lasted 10 to 15 minutes and then this was 
followed by right arm numbness, which again lasted 10 to 15 minutes.  His 
symptoms were resolved by the time he reached the emergency room.  He has had 
no recurrence of symptoms nor any new neurological symptoms.  He reports that 
his INRs have frequently been subtherapeutic or supratherapeutic in the past.  
He indicates he has diagnosis of antiphospholipid syndrome, which he believes 
was arrived at after he had myocardial infarction in 1999 and he has been on 
warfarin since then.  He reports to Dr. Prado made this diagnosis.  He denies 
any other history of blood clotting problems such as DVT or PE.

 

MEDICATIONS:

1.  Tylenol 650 q.4 p.r.n.

2.  Vitamin D 5000 units daily.

3.  TriCor 145 mg daily.

4.  Ferrous sulfate 325 mg daily.

5.  Furosemide 20 mg q.a.m.

6.  Gabapentin 300 mg t.i.d.

7.  Levothyroxine 125 mcg daily.

8.  Meclizine 25 mg t.i.d. p.r.n. dizziness.

9.  Melatonin 10 mg at bedtime.

10.  Omeprazole 20 mg daily.

11.  Ondansetron as needed.

12.  Warfarin 7.5 mg daily.

 

PHYSICAL EXAMINATION:  Vital Signs:  Temperature 97.6, blood pressure 150/60, 
heart rate 57, oxygen saturation 99% on room air.  General:  He is well 
appearing elderly man, in no acute distress.  Heart:  His heart is in regular 
rate and rhythm with loud systolic ejection murmur best heard at the right 
upper sternal border with radiation to the right carotid artery.  Lungs:  Clear 
to auscultation.  He has arthritic changes in his hands, but no extremity 
edema.  Skin:  Intact. Neurologic:  He is fully awake, alert and oriented.  
Speech is fluent without dysarthria or aphasia.  On cranial nerve testing, 
pupils are equal, round, and reactive from 2 to 1.5 mm bilaterally.  Versions 
are full without nystagmus. Fields are full to confrontation.  Facial sensation 
and musculature is full and symmetric.  Hearing is intact to finger rub on the 
right, diminished on the left. Palate elevates symmetrically and the tongue is 
midline.  On motor examination, there is normal bulk and tone in the upper and 
lower extremities.  He has full strength in his upper and lower extremities 
with the exception of some difficulty with testing his shoulder strength 
secondary to bilateral rotator cuff pathology. There is no significant pronator 
drift.  On sensory testing, he had some patchy loss of sensation to pinprick in 
his right arm, but this did not follow any specific nerve root distribution.  
However, it did involve more the dorsal aspect of the arm than the ventral 
aspect of the lower forearm on the right.  Reflexes were 2+ throughout with the 
exceptions of the ankles, which were absent with the ongoing toes.  His gait 
was narrow based and mostly stable aside from when he turned when he did step 
out to the right which he attributed to pain.

 

LABORATORY DATA:  His INR was 1.89 on admission and 1.86 today.  His CBC is 
overall unremarkable as is his CMP.  His fasting cholesterol studies this 
morning showed triglycerides of 105, total cholesterol 185, , HDL of 
43.6.

 

I reviewed his brain MRI which did not show any evidence of an acute infarct, 
but it did show extensive evidence of confluent white matter disease as well as 
brainstem signal changes consistent with significant small vessel disease.

 

His carotid ultrasound showed no evidence for hemodynamically significant 
stenosis.

 

His transthoracic echocardiogram showed an EF of 55 to 60% with mild concentric 
LVH, abnormal diastolic function of the left ventricle with mild to moderate 
dilation of the left atrium.  There is no PFO.  He has moderate to severe 
aortic stenosis, which has been worsened compared to December 2017 when it was 
previously graded as moderate.  In addition, there is mild mitral stenosis.

 

IMPRESSION:  Wilmer Campbell is an 84-year-old man with multiple 
cardiovascular risk factors as well as reported history of antiphospholipid 
syndrome, on warfarin who presented with transient numbness of the right face 
and arm, which occurred sequentially.  He has had no recurrence of these 
symptoms.  His INR is subtherapeutic.  Given the involvement to the face, this 
is most likely a TIA and I recommended bridging with Lovenox until his INR is 
therapeutic, given the history of APL.  He indicates that his goal range has 
been 2 to 3 in the past.  In addition, his LDL is above goal at 120 and his 
allergy list does indicate that he has had difficulty tolerating statins in the 
past.  However, we will discuss possibility of starting Crestor with him with 
the goal of lowering the LDL to 70 or less.  If he is amenable to this, we will 
get baseline CK, in case he does have difficulties with myalgias, this could be 
followed with a CK at that time.  Otherwise, neurologically he appears stable 
for discharge today.

 

 [id number]/CPS #: [id number]

[facility]

## 2018-04-21 NOTE — DS
CC:  Dr. Tab Sadler; [facility]; Dr. Jaquelin Prado*

 

DISCHARGE SUMMARY:

 

DATE OF ADMISSION:  04/20/18

 

DATE OF DISCHARGE:  04/21/18

 

ATTENDING PHYSICIAN:  Dr. Tabatha Hearn* (dictated by Ly Gardner NP).

 

PRIMARY CARE PROVIDER:  Dr. Tab Sadler and Dr[facility] Navdeep Klein [facility].

 

PRIMARY DIAGNOSES:

1.  Transient ischemic attack.

2.  Elevated LDL.

3.  Subtherapeutic INR.

4.  Aortic stenosis.

 

SECONDARY DIAGNOSES:

1.  Coronary artery disease.

2.  Hypothyroidism.

3.  Hypertension.

4.  Hyperlipidemia.

5.  Peripheral vascular disease.

6.  Antiphospholipid syndrome.

7.  Gastroesophageal reflux disease.

8.  Spinal stenosis.

9.  Diastolic congestive heart failure.

 

CONSULTATIONS WHILE IN THE HOSPITAL:  Dr. Mamie Merritt with Neurology.

 

STUDIES WHILE IN THE HOSPITAL:

1.  Brain CT on 04/20/18.  Radiologist's impression:  No acute intracranial 
pathology.  Chronic small vessel ischemic changes.

2.  Chest x-ray on 04/20/18.  Radiologist's impression:  No active 
cardiopulmonary disease.

3.  Brain MRI on 04/20/18.  Radiologist's impression:  No evidence for acute 
intracranial abnormality.  Atrophy and findings suggestive of severe chronic 
small vessel ischemic changes.  Findings suggestive of chronic sinusitis.

4.  Bilateral carotid Doppler ultrasound on 04/20/18.  Radiologist's impression
: There is no sonographic evidence of hemodynamically significant stenosis in 
the bilateral carotid arteries.

5.  Transthoracic echocardiogram on 04/20/18.  Cardiologist's conclusion, there 
is normal left ventricular systolic function.  The estimated ejection fraction 
is 55 to 60%.  Global left ventricular wall motion and contractility are within 
normal limits.  The left ventricular chamber size is normal.  Mild concentric 
left ventricular hypertrophy is observed.  Abnormal left ventricular diastolic 
function is observed.  The left atrium is mild to moderately dilated.  A patent 
foramen ovale is not seen.  There is moderate-to-severe aortic stenosis, mild 
mitral stenosis.  Since the prior echocardiogram completed on 12/01/17, 
pertinent changes are prior AS graded moderate and prior MS not noted.

 

DISCHARGE MEDICATIONS:  [place] medication:  Lovenox 120 mg subcutaneous every 
24 hours.

 

Continued home medications:

 

1.  Multivitamin 1 tablet oral daily.

2.  Furosemide 20 mg oral daily.

3.  MiraLAX 17 g oral daily as needed for constipation.

4.  Vitamin C 2000 mg oral twice daily.

5.  Biotin 5000 mcg oral every morning.

6.  Vitamin B12 1000 mcg oral every morning.

7.  Melatonin 10 mg oral daily at bedtime.

8.  Levothyroxine 125 mcg oral every morning.

9.  Gabapentin 300 mg oral 3 times daily.

10.  CoQ10 400 mg oral every morning.

11.  Omeprazole 20 mg oral every morning.

12.  Guaifenesin 800 mg oral twice daily.

13.  Amoxicillin 2000 mg oral once as needed prior to dental work.

14.  CoQ10 200 mg oral every day.

15.  Glucosamine chondroitin 1 tablet oral daily.

16.  Vitamin B complex with vitamin C 1 capsule oral daily.

17.  Ferrous sulfate 325 mg oral daily.

18.  Calcium 500+ vitamin D 1 tablet oral daily.

19.  Vitamin D 5000 units oral daily.

20.  TriCor 145 mg oral daily.

21.  Meclizine 25 mg oral twice daily as needed for nausea.

21.  Arginine 500 mg oral daily.

 

Changed her medication, warfarin increased to 7.5 mg oral daily.

 

HISTORY OF PRESENT ILLNESS/HOSPITAL COURSE:  Mr. Campbell is an 84-year-old 
male with past medical history significant for coronary artery disease, aortic 
stenosis, hypothyroidism, hyperlipidemia, hypertension, diastolic congestive, 
heart failure, peripheral vascular disease, antiphospholipid syndrome on 
warfarin, GERD and spinal stenosis who presented to the emergency room after 
noticing a dull frontal headache and when he went to grab some ibuprofen he had 
a scoop of yogurt with this.  He then noticed that the inside of his mouth on 
the right side felt numb, he then progressed to have numbness down his right 
arm mostly along the lateral aspect of the right arm down into the fourth and 
fifth fingers.  He had no pain with this. The numbness progressed and stayed 
numb.  The numbness in his mouth then went away. He had no facial drooping, no 
difficulty with speech.  His headache resolved.  He discussed his numbness with 
the aide who is there to help him care for his wife and she was concerned.  Dr. Prado's office happened to call the patient.  He discussed what was happening 
and they recommended he come to the emergency room.

 

While in the emergency room, the patient's symptoms had actually resolved 
before he arrived.  He denied any pain.  His INR was subtherapeutic.  He 
underwent a CT with some chronic small vessel ischemic changes and no acute 
findings.  He had a chest x- ray without significant findings.  He had a 
negative toxicology, fairly unremarkable labs and the hospitalists were asked 
to evaluate the patient for admission.

 

While in the hospital, the patient underwent an MRI showing no acute findings.  
He had an echo with a bubble study showing increase in his aortic stenosis from 
mild to moderate to moderate to severe.  His INR remained subtherapeutic.  He 
had his warfarin increased.  He was seen in consultation by Dr. Merritt with 
Neurology who felt that this was likely a TIA.  She recommended bridging his 
warfarin with Lovenox while his INR was subtherapeutic.  The patient continued 
to be symptom free and is ready for discharge today.  He had bilateral carotid 
Doppler showing no significant stenosis.

 

Mr. Campbell is stable for discharge today.  Vital signs are as follows: 
Temperature 98.2, heart rate 52, respiratory rate 16, O2 sat 100% on room air, 
blood pressure 126/65.

 

DISCHARGE PLAN:  The patient will be discharged to home.  Activity, as 
tolerated. He will be on a heart healthy, low-fat diet.  In regards to his TIA, 
he will be continued on aspirin and warfarin with a bridge to Lovenox until his 
INR is greater than 2.  We have provided that patient with 2 more days worth of 
Lovenox to get him to Monday when he will have a repeat INR done in Dr. Sadler'
s office.  In the meantime, the patient has been asked to take 7.5 mg daily of 
warfarin and then have further instructions per Dr. Sadler's office after his 
INR is done on Monday.  The patient was noted to have an elevated LDL while he 
was here of 120 with fasting labs this morning.  The patient was offered to try 
Crestor as he has not tried that in the past.  He adamantly declined as he 
states that within 2 days he would have leg pain.  I did do a CK during his 
stay in the event that he is placed on a statin this can be monitored, so you 
have a baseline.  The patient has been resumed on all of his other usual home 
medications.  The patient has been asked to call Dr. Sadler over to Dr. Klein'
s office on Monday morning to setup followup appointments.  He has also been 
asked to call Dr. Prado's office to set up a followup appointment in regards 
to the worsening aortic stenosis.  Mr. Campbell has been asked to return to 
the emergency room for any chest pain, shortness of breath for signs of a 
stroke such as one sided weakness, facial drooping, or difficulty with speech.

 

This is a summarized report of a complex medical history and hospital stay.  
For further details, please see the entire medical record.

 

TIME SPENT:  Time for this discharge was approximately 50 minutes, greater than 
half of that were spent with the patient discussing discharge plans and 
instructions.

 

CONDITION ON DISCHARGE:  Stable.

 

____________________________________ 

LY GARDNER, TASH

 

[id number]/CPS #: [id number]

DELMIS

## 2018-04-21 NOTE — PN
Subjective


Date of Service: 04/21/18


Interval History: 





Patient seen and examined at bedside. Denies fever, chills, shortness of breath

, chest discomfort, N/V/D. Pt states that the numbness and tingling in his 

right face and right arm have resolved.





Discussed with him the possibility of trying Crestor for his elevated LDL and 

he states that he is unable to take any statins due to LE pain that he develops 

within 2 days of starting them.





Tele: Sinus susana, rate 48-60's








Family History: Unchanged from Admission


Social History: Unchanged from Admission


Past Medical History: Unchanged from Admission





Objective


Active Medications: 





Acetaminophen (Tylenol Tab*)  650 mg PO Q4H PRN Reason: FEVER/PAIN


Cholecalciferol (Vitamin D Tab*)  5,000 units PO DAILY [facility]


Fenofibrate (Tricor(Nf))  145 mg PO DAILY [facility]


Ferrous Sulfate (Ferrous Sulfate Tab*)  325 mg PO DAILY [facility]


Furosemide (Lasix Tab*)  20 mg PO QAM [facility]


Gabapentin (Neurontin Cap(*))  300 mg PO TID [facility]


Levothyroxine Sodium (Synthroid Tab*)  125 mcg PO QAM@0600 [facility]


Meclizine HCl (Antivert Tab*)  25 mg PO TID PRN Reason: DIZZINESS


Melatonin [Melatonin (] 10 Mg)  10 mg PO BEDTIME [facility]


Omeprazole (Prilosec Cap*)  20 mg PO QAM@0730 [facility]


Ondansetron HCl (Zofran Inj*)  4 mg IV Q6H PRN Reason: NAUSEA


Polyethylene Glycol/Electrolytes (Miralax*)  17 gm PO QPM PRN Reason: 

CONSTIPATION


Warfarin Sodium (Coumadin Tab(*))  7.5 mg PO DAILY@1700 [facility]





 Vital Signs - 8 hr











  04/21/18 04/21/18 04/21/18





  08:00 08:21 08:27


 


Temperature  98.4 F 


 


Pulse Rate  57 


 


Respiratory 16 16 





Rate   


 


Blood Pressure  155/67 





(mmHg)   


 


O2 Sat by Pulse  96 97





Oximetry   














  04/21/18 04/21/18 04/21/18





  08:54 11:00 11:22


 


Temperature   


 


Pulse Rate  63 


 


Respiratory 16  16





Rate   


 


Blood Pressure   





(mmHg)   


 


O2 Sat by Pulse   





Oximetry   














  04/21/18 04/21/18





  11:41 13:39


 


Temperature 97.6 F 


 


Pulse Rate 57 


 


Respiratory 12 16





Rate  


 


Blood Pressure 150/60 





(mmHg)  


 


O2 Sat by Pulse 99 





Oximetry  











Oxygen Devices in Use Now: None


Appearance: NAD, laying in bed


Ears/Nose/Mouth/Throat: Mucous Membranes Moist


Respiratory: Symmetrical Chest Expansion and Respiratory Effort, Clear to 

Auscultation


Cardiovascular: NL Sounds; No Murmurs; No JVD, RRR


Abdominal: NL Sounds; No Tenderness; No Distention


Extremities: No Edema


Skin: No Rash or Ulcers


Neurological: Alert and Oriented x 3, NL Muscle Strength and Tone


Lines/Tubes/Other Access: Clean, Dry and Intact Peripheral IV - site benign


Nutrition: Taking PO's


Result Diagrams: 


 04/21/18 05:42





 04/21/18 05:42


Additional Lab and Data: 


.





Assess/Plan/Problems-Billing


Assessment: 





Mr. Campbell is an 83 yo male with PMH significant for CAD, AS, MI, 

hypothyroidism, HLD, HTN, D CHF, PVD, GERD, spinal stenosis, and 

antiphospholipid syndrome who presented to the emergency room with complaints 

of numbness of right face and right arm.











- Patient Problems


(1) TIA (transient ischemic attack)


Comment: 


- Numbness has resolved


- Elevated LDL, Pt states he can not tolerate statins at all


- Continue low fat , heart healhty diet


- Continue warfarin with lovenox bridge   





(2) Aortic stenosis


Code(s): I35.0 - NONRHEUMATIC AORTIC (VALVE) STENOSIS   SNOMED Code(s): 85302057


   Comment: 


- Increased from mild to moderate - severe


- Follow-up with Cardiology outpatient   





(3) HTN (hypertension)


Current Visit: Yes   Status: Acute   Code(s): I10 - ESSENTIAL (PRIMARY) 

HYPERTENSION   SNOMED Code(s): 41991381


   Comment: 


- -150's


- Continue lasix   





(4) HLD (hyperlipidemia)


Code(s): E78.5 - HYPERLIPIDEMIA, UNSPECIFIED   SNOMED Code(s): 98224492


   Comment: 


- Pt is unable to tolerate statins   





(5) PVD (peripheral vascular disease)


Code(s): I73.9 - PERIPHERAL VASCULAR DISEASE, UNSPECIFIED   SNOMED Code(s): 

276374809


   Comment: 


- Continue ASA   





(6) Antiphospholipid syndrome


Code(s): D68.61 - ANTIPHOSPHOLIPID SYNDROME   SNOMED Code(s): 83504984


   Comment: 


- Goal INR 2-3


- Continue warfarin (increased) with lovenox bridge   





(7) CAD (coronary artery disease)


Code(s): I25.10 - ATHSCL HEART DISEASE OF NATIVE CORONARY ARTERY W/O ANG PCTRS 

  SNOMED Code(s): 27654355


   Comment: 


- Asymptomatic 


- Continue aspirin and warfarin   





(8) Chronic back pain


Code(s): M54.9 - DORSALGIA, UNSPECIFIED; G89.29 - OTHER CHRONIC PAIN   SNOMED 

Code(s): 312932117


   Comment: 


- Continue neurontin with oxycodone for breakthrough pain.   





(9) Hypothyroidism


Code(s): E03.9 - HYPOTHYROIDISM, UNSPECIFIED   SNOMED Code(s): 91569602


   Comment: 


- Continue levothyroxine   





(10) GERD (gastroesophageal reflux disease)


Code(s): K21.9 - GASTRO-ESOPHAGEAL REFLUX DISEASE WITHOUT ESOPHAGITIS   SNOMED 

Code(s): 788782196


   Comment: 


- Continue omeprazole   





(11) Diastolic CHF


Code(s): I50.30 - UNSPECIFIED DIASTOLIC (CONGESTIVE) HEART FAILURE   SNOMED Code

(s): 759581183


   Comment: 


- Continue lasix   





(12) DVT prophylaxis


Code(s): DQX8375 -    SNOMED Code(s): 216434663


   Comment: 


- Lovenox bridge to warfarin (INR subtherapeutic).   





(13) Full code status


Code(s): Z78.9 - OTHER SPECIFIED HEALTH STATUS   SNOMED Code(s): 078653071


   


Status and Disposition: 





OBV. Stable for discharge to home today.

## 2018-05-02 ENCOUNTER — HOSPITAL ENCOUNTER (EMERGENCY)
Dept: HOSPITAL 25 - ED | Age: 83
Discharge: HOME | End: 2018-05-02
Payer: MEDICARE

## 2018-05-02 VITALS — SYSTOLIC BLOOD PRESSURE: 151 MMHG | DIASTOLIC BLOOD PRESSURE: 76 MMHG

## 2018-05-02 DIAGNOSIS — D68.61: ICD-10-CM

## 2018-05-02 DIAGNOSIS — Z96.643: ICD-10-CM

## 2018-05-02 DIAGNOSIS — E78.00: ICD-10-CM

## 2018-05-02 DIAGNOSIS — I25.10: ICD-10-CM

## 2018-05-02 DIAGNOSIS — Z79.01: ICD-10-CM

## 2018-05-02 DIAGNOSIS — Z95.1: ICD-10-CM

## 2018-05-02 DIAGNOSIS — I10: ICD-10-CM

## 2018-05-02 DIAGNOSIS — H53.8: ICD-10-CM

## 2018-05-02 DIAGNOSIS — I73.9: ICD-10-CM

## 2018-05-02 DIAGNOSIS — Z88.8: ICD-10-CM

## 2018-05-02 DIAGNOSIS — E03.9: ICD-10-CM

## 2018-05-02 DIAGNOSIS — G45.9: Primary | ICD-10-CM

## 2018-05-02 DIAGNOSIS — R00.1: ICD-10-CM

## 2018-05-02 DIAGNOSIS — K21.9: ICD-10-CM

## 2018-05-02 DIAGNOSIS — Z86.74: ICD-10-CM

## 2018-05-02 DIAGNOSIS — Z91.041: ICD-10-CM

## 2018-05-02 DIAGNOSIS — R42: ICD-10-CM

## 2018-05-02 DIAGNOSIS — I25.2: ICD-10-CM

## 2018-05-02 DIAGNOSIS — Z95.5: ICD-10-CM

## 2018-05-02 LAB
BASOPHILS # BLD AUTO: 0.1 10^3/UL (ref 0–0.2)
EOSINOPHIL # BLD AUTO: 0.5 10^3/UL (ref 0–0.6)
HCT VFR BLD AUTO: 42 % (ref 42–52)
HGB BLD-MCNC: 14 G/DL (ref 14–18)
INR PPP/BLD: 1.56 (ref 0.77–1.02)
LYMPHOCYTES # BLD AUTO: 1.5 10^3/UL (ref 1–4.8)
MCH RBC QN AUTO: 29 PG (ref 27–31)
MCHC RBC AUTO-ENTMCNC: 33 G/DL (ref 31–36)
MCV RBC AUTO: 86 FL (ref 80–94)
MONOCYTES # BLD AUTO: 0.8 10^3/UL (ref 0–0.8)
NEUTROPHILS # BLD AUTO: 3.6 10^3/UL (ref 1.5–7.7)
NRBC # BLD AUTO: 0 10^3/UL
NRBC BLD QL AUTO: 0.1
PLATELET # BLD AUTO: 204 10^3/UL (ref 150–450)
RBC # BLD AUTO: 4.88 10^6/UL (ref 4–5.4)
WBC # BLD AUTO: 6.3 10^3/UL (ref 3.5–10.8)

## 2018-05-02 PROCEDURE — 84484 ASSAY OF TROPONIN QUANT: CPT

## 2018-05-02 PROCEDURE — 96372 THER/PROPH/DIAG INJ SC/IM: CPT

## 2018-05-02 PROCEDURE — 85610 PROTHROMBIN TIME: CPT

## 2018-05-02 PROCEDURE — 85730 THROMBOPLASTIN TIME PARTIAL: CPT

## 2018-05-02 PROCEDURE — 70450 CT HEAD/BRAIN W/O DYE: CPT

## 2018-05-02 PROCEDURE — 83605 ASSAY OF LACTIC ACID: CPT

## 2018-05-02 PROCEDURE — 96360 HYDRATION IV INFUSION INIT: CPT

## 2018-05-02 PROCEDURE — 71045 X-RAY EXAM CHEST 1 VIEW: CPT

## 2018-05-02 PROCEDURE — 36415 COLL VENOUS BLD VENIPUNCTURE: CPT

## 2018-05-02 PROCEDURE — 93005 ELECTROCARDIOGRAM TRACING: CPT

## 2018-05-02 PROCEDURE — 80053 COMPREHEN METABOLIC PANEL: CPT

## 2018-05-02 PROCEDURE — 80061 LIPID PANEL: CPT

## 2018-05-02 PROCEDURE — 86900 BLOOD TYPING SEROLOGIC ABO: CPT

## 2018-05-02 PROCEDURE — 99283 EMERGENCY DEPT VISIT LOW MDM: CPT

## 2018-05-02 PROCEDURE — 86850 RBC ANTIBODY SCREEN: CPT

## 2018-05-02 PROCEDURE — 85025 COMPLETE CBC W/AUTO DIFF WBC: CPT

## 2018-05-02 PROCEDURE — 86901 BLOOD TYPING SEROLOGIC RH(D): CPT

## 2018-05-02 NOTE — ED
Codey GOMEZ Julia, scribed for Kb Clark MD on 05/02/18 at 1039 .





Neurological HPI





- HPI Summary


HPI Summary: 





This patient is a 84 year old M BIBA to [facility] with a chief complaint of sudden 

onset of double vision, lightheadedness, and unsteady gait since 09:45 this 

morning last roughly 30 minutes. Pt repeats he couldnt put the words together

. He further describes his unsteady gait as weakness toward the right. Pt 

denies headache. Abdominal distention at baseline. Pt reports vertigo at 

baseline that is dissimilar to current symptoms.  Current symptoms similar to 

TIA occurring two weeks ago. PMHx includes MI, CABG, spinal stenosis, and CAD. 

Pt is taking blood thinners. Reports baseline INR of 2.5. Previous visits 

reviewed. 





EMS reports bradycardia and saO2 in the 90s. 








- History of Current Complaint


Stated Complaint: POSS STROKE


Time Seen by Provider: 05/02/18 10:24


Hx Obtained From: Patient, EMS


Onset/Duration: Sudden Onset, Started hours ago, Resolved


Onset Severity: Moderate


Current Severity: Mild


Neurological Deficit Location: Generalized


Character: Lightheaded, Visual Changes, Other: - gait changes


Episode Lasting: Seconds/Minutes - 30 minutes


Aggravating: Nothing


Alleviating: Nothing


Associated Signs and Symptoms: Positive: Visual Changes, Weakness, Lightheadness


Similar Episode/Dx as: TIA


Related Hx: Recent Illness - TIA, vertigo





- Additional Pertinent History


Primary Care Physician: [id number]





- Allergy/Home Medications


Allergies/Adverse Reactions: 


 Allergies











Allergy/AdvReac Type Severity Reaction Status Date / Time


 


Beta-Blockers AdvReac  See Comment Verified 04/20/18 15:09





(Beta-Adrenergic Bloc     


 


Iodinated Contrast- Oral and AdvReac  Headache Verified 04/20/18 15:09





IV Dye     


 


niacin AdvReac  Flushing Verified 04/20/18 15:07


 


simvastatin AdvReac  See Comment Verified 04/20/18 15:07


 


Statins-Hmg-Coa Reductase AdvReac  Muscle Ache Verified 04/20/18 15:09





Inhibitor     











Home Medications: 


 Home Medications





Colestipol (NF) 3 gm PO BID 05/02/18 [History Confirmed 05/02/18]


Omega-3 Fatty Acids (Nf) [Fish Oil (NF)] 2,000 mg PO DAILY 05/02/18 [History 

Confirmed 05/02/18]


Warfarin TAB(*) [Coumadin TAB(*)] 5 mg PO .EVERY THIRD DAY 05/02/18 [History 

Confirmed 05/02/18]


Warfarin TAB(*) [Coumadin TAB(*)] 7.5 mg PO .2 DAYS ON 1 DAY OFF 05/02/18 [

History Confirmed 05/02/18]











PMH/Surg Hx/FS Hx/Imm Hx


Endocrine/Hematology History: Reports: Hx Anticoagulant Therapy, Hx Thyroid 

Disease - hypothyroidism, Other Endocrine/Hematological Disorders - 

antiphospholipid antibody syndrome 


   Denies: Hx Diabetes


Cardiovascular History: Reports: Hx Cardiac Arrest, Hx Coronary Artery Disease, 

Hx Hypercholesterolemia, Hx Hypertension, Hx Myocardial Infarction, Hx 

Peripheral Vascular Disease, Hx Valvular Heart Disease - moderate aortic 

stenosis, Other Cardiovascular Problems/Disorders - CAD, moderate aortic 

stenosis, anti-phospholipid anitbody syndrome


   Denies: Hx Pacemaker/ICD


Respiratory History: Reports: Hx Asthma - as teen, none now, Hx Seasonal 

Allergies


   Denies: Hx Chronic Obstructive Pulmonary Disease (COPD)


GI History: Reports: Hx Gastroesophageal Reflux Disease, Other GI Disorders - 

hx ischemic colitis, no surgery


 History: 


   Denies: Hx Dialysis


Musculoskeletal History: Reports: Hx Arthritis, Hx Back Problems, Hx Orthopedic 

Injury, Hx Osteoporosis, Hx Joint Replacement - bilateral hip replacement, 

Other Musculoskeletal History - avascular necrosis of left femoral head, spinal 

stenosis


Sensory History: Reports: Hx Cataracts, Hx Contacts or Glasses - glasses, Hx 

Vision Problem


   Denies: Hx Eye Injury, Hx Eye Prosthesis, Hx Glaucoma, Hx Macular 

Degeneration, Hx Deafness, Hx Hearing Aid, Hx Hearing Problem


Opthamlomology History: Reports: Hx Cataracts, Hx Contacts or Glasses - glasses

, Hx Vision Problem


   Denies: Hx Eye Injury, Hx Eye Prosthesis, Hx Glaucoma, Hx Macular 

Degeneration


Neurological History: Reports: Hx Transient Ischemic Attacks (TIA), Other Neuro 

Impairments/Disorders - occas vertigo


   Denies: Hx CVA, Hx Dementia, Hx Seizures


Psychiatric History: 


   Denies: Hx Panic Disorder





- Cancer History


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 

in 1989





- Surgical History


Surgery Procedure, Year, and Place: bilateral rotator cuff repairs, right total 

hip replacement 2012,.  cardiac stents x2, 1999, 2-vessel CABG.  bilateral 

carpal tunnel releases and trigger finger release.  open appendectomy, cataract 

surgery.  umbilical hernia repair


Hx Anesthesia Reactions: No





- Immunization History


Date of Tetanus Vaccine: Up to date


Date of Influenza Vaccine: Fall 2014





- Family History


Known Family History: Positive: Other - colon cancer, and perforated bowel.





- Social History


Alcohol Use: Daily


Alcohol Amount: 1/2 glass wine with dinner


Substance Use Type: Reports: None


Smoking Status (MU): Never Smoked Tobacco





Review of Systems


Positive: Other - lightheaded


Positive: Diplopia


Positive: Weakness - unsteady gait towards right.  Negative: Headache


All Other Systems Reviewed And Are Negative: Yes





Physical Exam





- Summary


Physical Exam Summary: 





General: well-appearing, no pain distress


Skin: warm, color reflects adequate perfusion, dry


Head: normal


Eyes: EOMI, JAYSON


ENT: normal


Neck: supple, nontender


Respiratory: CTA, breath sounds present


Cardiovascular: Regular rhythm, bradycardic, murmur present


Abdomen: soft, nontender


Bowel: present


Musculoskeletal: normal, strength/ROM intact


Neurological: normal, sensory/motor intact, A&O x3, NIH scale is 0


Psychological: affect/mood appropriate





Triage Information Reviewed: Yes


Vital Signs On Initial Exam: 


 Initial Vitals











Resp


 


 6 


 


 05/02/18 10:30











Vital Signs Reviewed: Yes





Diagnostics





- Vital Signs


 Vital Signs











  Temp Pulse Resp BP Pulse Ox


 


 05/02/18 13:31  97.2 F  58  16  151/76  97


 


 05/02/18 11:38   48  15  125/54  95


 


 05/02/18 11:08   54  20  125/63  93


 


 05/02/18 11:06  97 F  58  16  125/63  


 


 05/02/18 11:05      95


 


 05/02/18 11:00    18  


 


 05/02/18 10:47   56  17  119/62  93


 


 05/02/18 10:42  97.8 F  55  15  119/62  


 


 05/02/18 10:31   62  29  151/100  


 


 05/02/18 10:30    6  














- Laboratory


Lab Results: 


 Lab Results











  05/02/18 05/02/18 05/02/18 Range/Units





  10:45 11:13 11:13 


 


WBC   6.3   (3.5-10.8)  10^3/ul


 


RBC   4.88   (4.0-5.4)  10^6/ul


 


Hgb   14.0   (14.0-18.0)  g/dl


 


Hct   42   (42-52)  %


 


MCV   86   (80-94)  fL


 


MCH   29   (27-31)  pg


 


MCHC   33   (31-36)  g/dl


 


RDW   15   (10.5-15)  %


 


Plt Count   204   (150-450)  10^3/ul


 


MPV   7.5   (7.4-10.4)  um3


 


Neut % (Auto)   56.1   (38-83)  %


 


Lymph % (Auto)   23.1 L   (25-47)  %


 


Mono % (Auto)   12.4 H   (0-7)  %


 


Eos % (Auto)   7.4 H   (0-6)  %


 


Baso % (Auto)   1.0   (0-2)  %


 


Absolute Neuts (auto)   3.6   (1.5-7.7)  10^3/ul


 


Absolute Lymphs (auto)   1.5   (1.0-4.8)  10^3/ul


 


Absolute Monos (auto)   0.8   (0-0.8)  10^3/ul


 


Absolute Eos (auto)   0.5   (0-0.6)  10^3/ul


 


Absolute Basos (auto)   0.1   (0-0.2)  10^3/ul


 


Absolute Nucleated RBC   0   10^3/ul


 


Nucleated RBC %   0.1   


 


INR (Anticoag Therapy)    1.56 H  (0.77-1.02)  


 


APTT    36.9 H  (26.0-36.3)  seconds


 


Sodium     (139-145)  mmol/L


 


Potassium     (3.5-5.0)  mmol/L


 


Chloride     (101-111)  mmol/L


 


Carbon Dioxide     (22-32)  mmol/L


 


Anion Gap     (2-11)  mmol/L


 


BUN     (6-24)  mg/dL


 


Creatinine     (0.67-1.17)  mg/dL


 


Est GFR ( Amer)     (>60)  


 


Est GFR (Non-Af Amer)     (>60)  


 


BUN/Creatinine Ratio     (8-20)  


 


Glucose     ()  mg/dL


 


POC Glucose (mg/dL)  88    ()  mg/dL


 


Lactic Acid     (0.5-2.0)  mmol/L


 


Calcium     (8.6-10.3)  mg/dL


 


Total Bilirubin     (0.2-1.0)  mg/dL


 


AST     (13-39)  U/L


 


ALT     (7-52)  U/L


 


Alkaline Phosphatase     ()  U/L


 


Troponin I     (<0.04)  ng/mL


 


Total Protein     (6.4-8.9)  g/dL


 


Albumin     (3.2-5.2)  g/dL


 


Globulin     (2-4)  g/dL


 


Albumin/Globulin Ratio     (1-3)  


 


Triglycerides     mg/dL


 


Cholesterol     mg/dL


 


LDL Cholesterol     mg/dL


 


HDL Cholesterol     mg/dL


 


Blood Type     


 


Antibody Screen     














  05/02/18 05/02/18 05/02/18 Range/Units





  11:13 11:13 11:13 


 


WBC     (3.5-10.8)  10^3/ul


 


RBC     (4.0-5.4)  10^6/ul


 


Hgb     (14.0-18.0)  g/dl


 


Hct     (42-52)  %


 


MCV     (80-94)  fL


 


MCH     (27-31)  pg


 


MCHC     (31-36)  g/dl


 


RDW     (10.5-15)  %


 


Plt Count     (150-450)  10^3/ul


 


MPV     (7.4-10.4)  um3


 


Neut % (Auto)     (38-83)  %


 


Lymph % (Auto)     (25-47)  %


 


Mono % (Auto)     (0-7)  %


 


Eos % (Auto)     (0-6)  %


 


Baso % (Auto)     (0-2)  %


 


Absolute Neuts (auto)     (1.5-7.7)  10^3/ul


 


Absolute Lymphs (auto)     (1.0-4.8)  10^3/ul


 


Absolute Monos (auto)     (0-0.8)  10^3/ul


 


Absolute Eos (auto)     (0-0.6)  10^3/ul


 


Absolute Basos (auto)     (0-0.2)  10^3/ul


 


Absolute Nucleated RBC     10^3/ul


 


Nucleated RBC %     


 


INR (Anticoag Therapy)     (0.77-1.02)  


 


APTT     (26.0-36.3)  seconds


 


Sodium  144    (139-145)  mmol/L


 


Potassium  4.1    (3.5-5.0)  mmol/L


 


Chloride  108    (101-111)  mmol/L


 


Carbon Dioxide  29    (22-32)  mmol/L


 


Anion Gap  7    (2-11)  mmol/L


 


BUN  18    (6-24)  mg/dL


 


Creatinine  0.85    (0.67-1.17)  mg/dL


 


Est GFR ( Amer)  110.4    (>60)  


 


Est GFR (Non-Af Amer)  85.9    (>60)  


 


BUN/Creatinine Ratio  21.2 H    (8-20)  


 


Glucose  70    ()  mg/dL


 


POC Glucose (mg/dL)     ()  mg/dL


 


Lactic Acid   1.3   (0.5-2.0)  mmol/L


 


Calcium  9.3    (8.6-10.3)  mg/dL


 


Total Bilirubin  0.60    (0.2-1.0)  mg/dL


 


AST  24    (13-39)  U/L


 


ALT  31    (7-52)  U/L


 


Alkaline Phosphatase  58    ()  U/L


 


Troponin I  0.02    (<0.04)  ng/mL


 


Total Protein  6.0 L    (6.4-8.9)  g/dL


 


Albumin  3.7    (3.2-5.2)  g/dL


 


Globulin  2.3    (2-4)  g/dL


 


Albumin/Globulin Ratio  1.6    (1-3)  


 


Triglycerides  118    mg/dL


 


Cholesterol  166    mg/dL


 


LDL Cholesterol  102    mg/dL


 


HDL Cholesterol  40.4    mg/dL


 


Blood Type    A Positive  


 


Antibody Screen    Negative  











Result Diagrams: 


 05/02/18 11:13





 05/02/18 11:13


Lab Statement: Any lab studies that have been ordered have been reviewed, and 

results considered in the medical decision making process.





- Radiology


  ** CXR


Radiology Interpretation Completed By: Radiologist - Interval enlargement of 

abnormal RIGHT inferior mediastinal contour which may reflect a primary 

pulmonary lesion or pathologic mediastinal process. Consider contrast-enhanced 

chest CT for further assessment. ED Physician has reviewed this report.





- CT


  ** Brain CT


CT Interpretation Completed By: Radiologist - 1. Negative for intracranial 

hemorrhage or other acute intracranial process. 2. Mild involutional change and 

stigmata of chronic small vessel ischemic disease. 3. Mucosal thickening at the 

paranasal sinuses without compelling evidence for acute sinusitis. ED Physician 

has reviewd this report.





- EKG


  ** 1100


Cardiac Rate: Bradycardia


EKG Rhythm: Sinus Bradycardia - 45 BPM


Ectopy: None


EKG Interpretation: nonspecific T abnormality in lateral leads, prolonged UT 

interval of 230





NIH Scale





- NIH Scale


Level of Consciousness: Alert/Keenly Responsive


Ask Patient the Month and His/Her Age: Both Correct


Ask Pt to Open/Close Eyes and /Release Non-Paretic Hand: Both Correctly


Best Gaze (Only Horizontal Eye Movement): Normal


Visual Field Testing: No Visual Loss


Facial Paresis-Pt to Smile & Close Eyes or Grimace Symmetry: Normal/Symmetrical


Motor Function - Right Arm: No Drift-Holds 10 Seconds


Motor Function - Left Arm: No Drift-Holds 10 Seconds


Motor Function - Right Leg: No Drift-Holds 10 Seconds


Motor Function - Left Leg: No Drift-Holds 10 Seconds


Limb Ataxia-Must be out of Proportion to Weakness Present: Absent


Sensory (Use Pinprick to Test Arms/Legs/Trunk/Face): Normal


Best Language (Describe Picture, Name Items): No Aphasia


Dysarthria (Read Several Words): Normal


Extinction and Inattention: No Abnormality


Total Score: 0





Re-Evaluation





- Re-Evaluation


  ** 1


Re-Evaluation Time: 13:22


Comment: Informed pt of results.





  ** 2


Re-Evaluation Time: 13:52


Comment: Informed pt of Dr. Joseph recommendations.





Course/Dx





- Course


Course Of Treatment: DISCUSSED RESULTS WITH THE PATIENT.  DR GUILLERMO, NEUROLOGY, 

SAW PATIENT IN THE ED AND RECOMMEDS STARTING ON LOVENOX UNTIL THE INR IS 

THERAPEUTIC.  I DISCUSSED THIS WITH DR SADLER AND THE PATIENT.  F/U PMD AND HIS 

NEUROLOGIST; RETURN IF WORSE.





- Diagnoses


Provider Diagnoses: 


 TIA (transient ischemic attack), Dizziness, Blurred vision, Subtherapeutic 

anticoagulation








Discharge





- Sign-Out/Discharge


Documenting (check all that apply): Discharge/Admit/Transfer





- Discharge Plan


Condition: Stable


Disposition: HOME


Patient Education Materials:  Transient Ischemic Attack (ED)


Referrals: 


Tab Sadler MD [Primary Care Provider] - 


Additional Instructions: 


FOLLOW UP WITH DR SADLER AND YOUR NEUROLOGIST.


DR GUILLERMO RECOMMENDS YOU BE THERAPEUTIC ON YOUR COUMADIN. TODAY YOUR INR WAS SUB 

THERAPEUTIC. THEREFORE, HE RECOMMENDS BEING ON LOVENOX UNTIL THE COUMADIN IS 

THERAPEUTIC.


THE DOSE OF LOVENOX IS 80MG ONCE A DAY. DISCUSS THIS WITH DR SADLER SO HE CAN 

[facility]AC[facility]IT[facility].


RETURN TO THE EMERGENCY DEPARTMENT FOR ANY WORSENING OF YOUR CONDITION; WEAKNESS

, NUMBNESS, YOU FEEL ILL OR QUESTIONS OR CONCERNS.





- Billing Disposition and Condition


Condition: STABLE


Disposition: HOME





Consult


Consult: 





at 13:39, Dr. Sadler, recommends starting patient on 80mg of Lovenox daily. 





The documentation as recorded by the Codey jacobs Julia accurately reflects 

the service I personally performed and the decisions made by me, Kb Clark MD.

## 2018-05-02 NOTE — RAD
Indication: Neurologic changes. Possible code gray. Cardiac disease.



Comparison: April 20, 2018 chest radiograph and February 15, 2016 chest CT.



Technique: Upright AP 1120 hours



Report: Mild prominence of the interstitial markings without significant change. Median

sternotomy wires and mediastinal vascular clips. Mild cardiomegaly. Convex RIGHT inferior

mediastinal contour is unchanged compared with the 2018 chest radiograph however increased

in magnitude over the January 20, 2016 chest radiograph. Negative for pleural effusion or

pneumothorax.



IMPRESSION: Interval enlargement of abnormal RIGHT inferior mediastinal contour which may

reflect a primary pulmonary lesion or pathologic mediastinal process. Consider

contrast-enhanced chest CT for further assessment.

## 2018-05-02 NOTE — CONS
CONSULTATION REPORT:

 

DATE OF CONSULT:  05/02/18 - EMERGENCY DEPT

 

REASON FOR CONSULT:  Neurology was consulted by Dr. Kb Clark to 
evaluate for transient double vision and unsteady gait.

 

CHIEF COMPLAINT:  Double vision this morning while reading the newspaper.

 

HISTORY OF PRESENT ILLNESS:  Mr. Wilmer Campbell is an 84-year-old man, who 
has history of recent TIA manifesting his right face and arm numbness that 
resolved, cataract surgery, ocular migraine, antiphospholipid syndrome who is 
on Coumadin, CABG, and severe aortic stenosis, who presented to the ER via EMS 
today with symptoms of transient double vision.  The patient woke up at 
approximately 7 a.m. today with no neurological deficits.  He was sitting and 
trying to read the newspaper, but could not focus on due to double vision.  He 
cannot say if the double vision was horizontal or vertical.  He did close one 
eye and it seems like that the double vision resolved.  He does use bifocals 
with prism lenses.  The symptoms lasted for approximately 1 hour.  Symptoms 
have completely resolved.

 

PAST MEDICAL HISTORY:  As reported in the HPI.

 

PAST SURGICAL HISTORY:  Hip replacement, hernia repair, appendectomy, cataracts.

 

MEDICATION LIST:  Home medications:

1.  Colestipol.

2.  Omega-3 fatty acid.

3.  Coumadin.

 

ALLERGIES:  The patient is not on any statin therapy as he has had history of 
allergies.  Other allergies listed BETA BLOCKERS, CONTRAST, NIACIN.

 

SOCIAL HISTORY:  Denies tobacco use.  He drinks 1 glass of wine a day.  He 
lives with his wife, who unfortunately suffers from dementia.

 

REVIEW OF SYSTEMS:  The patient denied any current visual disturbance.  He does 
have a mild 2/10 partial headache in the frontal region; it is nonradiating, 
that is chronic.  He denied chest pain, shortness of breath, or palpitation.  A 
10-point review of systems was reviewed in detail and otherwise negative.

 

PHYSICAL EXAM:  Vitals:  Temperature 97 degrees, heart rate 61, respiration 18, 
oxygen saturation 95%, blood pressure 125/63.  General:  Well-nourished, well- 
developed man, in no acute distress.  Head:  Atraumatic and normocephalic 
without any obvious abnormalities.  Eyes:  Conjunctivae and corneas are clear.  
Wears bifocal lenses.  He has got a supple neck that is symmetrical.  No 
carotid bruits. Lungs:  Clear to auscultation bilaterally.  There is a 4/6 
holosystolic murmur that does not radiate to extremities and normal range of 
motion except for he has got restriction of the range of motion in the right 
shoulder joint region from previous rotator cuff injury.  Skin:  No skin 
lesions or lacerations.  Psych:  His affect is broad and normal mood.  
Neurological Examination:  Mental Status:  Awake, alert, and oriented to person
, place, time, and general circumstances.  Speech and language include 
expression, naming, repetition, and comprehension was assessed and found to be 
normal.  Cranial Nerves:  Normal confrontation bilaterally.  Pupils mid size 
and reactive to light.  Extraocular muscles are intact.  There is no ptosis. 
Sensation is intact in the forehead, cheeks, and jaw region bilaterally.  There 
is no facial droop or facial asymmetry and the patient was able to hear 
throughout the history process.  Symmetric palatal elevation.  Normal strength 
against resistance in the shoulder region.  Tongue is symmetrical with no 
deviation.  Motor:  There are no abnormal movements.  No pronator drift.  
Normal bulk and tone throughout. Normal strength 5/5 throughout.  Reflexes 
trace throughout with absent at the ankles.  Sensation:  There is a distal to 
proximal sensory gradient up to the ankles bilaterally.  He had completely 
absent vibratory response at the great toes, present at the medial malleolus.  
Proprioception is intact.  Coordination:  Normal finger-to-nose and rapid 
alternating movements bilaterally.  Gait:  Wide based, felt unsteady and 
developed lightheadedness when standing upright; this resolved after 1 minute.  
No ataxia.  No falls.

 

DIAGNOSTIC STUDIES/LAB DATA:  CT head without contrast, which I personally 
reviewed.  The patient has extensive small vessel changes in the subcortical 
region bilaterally.  The patient recently had carotid Dopplers completed on 04/
20/18 that showed no hemodynamically significant stenosis in the bilateral 
carotid arteries.

 

He also had an MRI brain completed on 04/20/18, which I personally reviewed.  
There was no evidence of acute infarction; however, significant atrophy and 
severe chronic small vessel ischemic changes were confirmed.  There were also 
findings suggestive of chronic sinusitis.

 

The patient also had a transthoracic echo completed on 04/20/18.  The echo 
showed an ejection fraction of 55% to 60% with mild-to-moderate dilatation of 
the left atrium.  There is also moderate-to-severe aortic stenosis.

 

Laboratory workup from today showed no evidence of infection, WBC of 6.3.  INR 
is 1.56.  Urinalysis was not obtained.

 

ASSESSMENT:  This is a pleasant 84-year-old man, who has recurrent neurological 
complaints.  Initially, the patient presented with right upper extremity 
paresthesias 1 week ago that have resolved and was recently diagnosed with 
transient ischemic attack.  He was found to have a subtherapeutic INR.  He was 
sent home with Lovenox and was recommended to follow up with his primary care 
doctor. His stroke workup at that time was negative.  However, currently the 
patient is coming in with new symptoms of double vision, gait disturbance, and 
lightheadedness that has also resolved.  He did have mild lightheadedness on my 
examination today that was triggered after sitting up suddenly from a seated 
position.  That resolved after 1 minute.  The patient was found to have an INR 
of 1.5.

 

On my clinical examination and given the provided history, I suspect the 
patient may have had another transient ischemic attack involving the posterior 
circulation causing the double vision.  Other differential diagnosis includes 
ocular migraine which he does have a history of over the last 15 years as well 
as orthostatic hypotension.  I am concerned that the lightheadedness could also 
be related to the aortic stenosis, which we know the patient has from a 
previous echo.

 

PLAN:  I do not recommend a full complete stroke workup at this point.  Given 
his subtherapeutic INR, the patient's INR needs to be 2-3 given his underlying 
antiphospholipid syndrome.  I will defer further workup to the ER providers if 
further workup for the aortic stenosis and orthostatic hypotension is required. 
The patient feels that he may be ready for discharge and can follow up with his 
primary care doctor and cardiologist.  The patient recently saw Dr. Sadler 2 
days ago and his INR was checked and apparently was 2.5.  I do recommend if we 
do discharge the patient that he should be on full-dose anticoagulation therapy 
such as Lovenox until his INR is therapeutic at 2-3.

 

From the Neurology standpoint, I do not recommend any further workup.  I 
discussed this recommendation with Dr. Clark.  I will sign off.  Please 
contact me for any questions or concerns.

 

 [id number]/[id number] #: [id number]

DELMIS

## 2018-05-02 NOTE — RAD
INDICATION: Neurologic changes. Strokelike symptoms.



COMPARISON: April 20, 2018 MRI. 



Technique: Noncontrast CT vertex of skull through foramen magnum.



Report: The basilar artery appears hyperdense however this finding is unchanged compared

with April 20, 2018 CT and the MRI of the same date documents a preserved flow void

without concern.



Mild prominence of the cerebral sulci and cerebellar fissures reflecting atrophy.

Congenital variant persistent cavum septum pellucidum. Proportional mild prominence of the

ventricles. Unremarkable basal cisterns. Decreased density in the periventricular and

subcortical white matter while non-specific is most likely due to chronic microangiopathy.

Negative for gray matter white matter obscuration, intra or extra-axial hemorrhage, or

mass effect. Unremarkable orbital contents.



Mild mucosal thickening at the paranasal sinuses. No paranasal sinus fluid levels within

the field-of-view. Normally aerated mastoid air spaces. Negative for suspicious calvarial

or skull base lesions. Unremarkable scalp.



IMPRESSION: 

1. Negative for intracranial hemorrhage or other acute intracranial process.

2. Mild involutional change and stigmata of chronic small vessel ischemic disease.

3. Mucosal thickening at the paranasal sinuses without compelling evidence for acute

sinusitis.

## 2019-06-03 ENCOUNTER — HOSPITAL ENCOUNTER (OUTPATIENT)
Dept: HOSPITAL 25 - CHICATH | Age: 84
Discharge: HOME | End: 2019-06-03
Attending: INTERNAL MEDICINE
Payer: MEDICARE

## 2019-06-03 VITALS — SYSTOLIC BLOOD PRESSURE: 130 MMHG | DIASTOLIC BLOOD PRESSURE: 68 MMHG

## 2019-06-03 DIAGNOSIS — I35.0: ICD-10-CM

## 2019-06-03 DIAGNOSIS — D68.61: ICD-10-CM

## 2019-06-03 DIAGNOSIS — E78.2: ICD-10-CM

## 2019-06-03 DIAGNOSIS — I25.10: Primary | ICD-10-CM

## 2019-06-03 DIAGNOSIS — Z95.1: ICD-10-CM

## 2019-06-03 LAB — INR PPP/BLD: 1.39 (ref 0.82–1.09)

## 2019-06-03 PROCEDURE — C1887 CATHETER, GUIDING: HCPCS

## 2019-06-03 PROCEDURE — 93455 CORONARY ART/GRFT ANGIO S&I: CPT

## 2019-06-03 PROCEDURE — 99156 MOD SED OTH PHYS/QHP 5/>YRS: CPT

## 2019-06-03 PROCEDURE — C1769 GUIDE WIRE: HCPCS

## 2019-06-03 PROCEDURE — 99157 MOD SED OTHER PHYS/QHP EA: CPT

## 2019-06-03 PROCEDURE — 85610 PROTHROMBIN TIME: CPT

## 2019-06-03 PROCEDURE — 36415 COLL VENOUS BLD VENIPUNCTURE: CPT

## 2019-06-04 NOTE — CATH
CC:  Dr. Saulo Ash of [facility]; Jaquelin Prado MD; Tab Sadler MD

 

CARDIAC CATHETERIZATION REPORT:

 

DATE OF PROCEDURE:  06/03/19

 

INDICATIONS FOR PROCEDURE:  I was asked by Dr. Prado to perform coronary 
arteriography on this patient in light of his upcoming TAVR procedure with a 
known history of prior LIMA graft to the LAD and vein graft to obtuse marginal 
branch for a distal left main lesion back in 2005.

 

PROCEDURE:

1.  Coronary arteriography.

2.  Approach was the right femoral artery.

 

PRECARDIAC CATHETERIZATION LABORATORY RESULTS:  Hemoglobin and hematocrit of 
14.4 and 43 with platelet count of 190,000.  BUN and creatinine of 21 and 0.8.  
Sodium 129, potassium 4.1, chloride 106, bicarb 30.  INR was 1.39.

 

EQUIPMENT UTILIZED:

1.  Right femoral artery sheath was a 5-[place] 11 cm  Hailey sheath.

2.  Diagnostic coronary catheters included a 5-[place] FL4 curve; a 5-[place] FR4 
curve diagnostic catheter; and a 5-[place] YONI catheter for LIMA graft.

3.  The guidewire utilized was initially a 145 length Wholey wire followed by 
260 length J-tip exchange wire.

 

MEDICATIONS GIVEN:  Medications given during the case included:

1.  Versed 1 mg.

2.  Local Xylocaine.

 

CONSENT:  The patient was interviewed and examined in the holding area where 
the risks and benefits were explained.  He understood them and wished to 
proceed.

 

DESCRIPTION OF PROCEDURE:  The patient was brought to the cardiovascular 
laboratory.  A formal time-out was performed.  He was prepped and draped in 
sterile fashion.  The right groin area was anesthetized with 1% lidocaine.  The 
right femoral artery was cannulated and the sheath was placed.  Coronary 
arteriography was performed utilizing the diagnostic catheters followed by vein 
graft arteriography to the obtuse marginal branch utilizing the right coronary 
diagnostic catheter.  Injections into the left subclavian artery were performed 
utilizing the YONI catheter.  Of note, because of marked tortuosity and mild-to-
moderate narrowing of the subclavian artery, the LIMA graft could not be 
selectively cannulated.  A blood pressure cuff was used on the left arm to 
obtain better images.  Following this, the catheter and sheath were removed and 
hemostasis was obtained with local pressure in the holding area.

 

The total contrast used was 80 cc of Visipaque dye.  The radiation exposure 
included 5.5 minutes of fluoro time.  The air kerma radiation was 615 mGy.  The 
DAP radiation was 5302 microgray per m2.

 

RESULTS:

 

CORONARY ARTERIOGRAPHY:  

   A.  Left coronary artery:

      1.  Left main - a critical 95% distal left main lesion was noted with 
calcium.

      2.  Left anterior descending artery - the left anterior descending artery 
is visualized by native injection showed competitive flow from a patent LIMA 
graft.  A first diagonal branch was noted.  There did not appear to be 
significant disease within this area.

      3.  Circumflex artery - when assessed via the injections into the needles 
of left main, there was competitive flow seen in only the most proximal portion 
of the circumflex.  It was transiently visualized there was heavy calcification
, but no critical obstruction seen.

   B.  Right coronary artery -  a dominant vessel supplying the PDA and 3 
posterior left ventricular branches.  There was an area of 40% stenosis noted 
in the mid portion of right coronary artery just after take off an acute 
marginal branch. There was mild 20% to 25% narrowing noted in the proximal to 
mid portion.

 

VEIN GRAFT ARTERIOGRAPHY TO THE OBTUSE MARGINAL BRANCH:  

   Vein graft is widely patent with excellent anastomosis to the obtuse 
marginal branch.  There was no significant lesion seen in antegrade fashion.  
Retrograde fashion, it supplied back into the circumflex and back into the left 
main and down the diagonal branches.  It was difficult to assess the degree of 
obstruction noted.  The ostium of the circumflex appeared to have a 40% to 45% 
narrowing noted.  The proximal portion of the circumflex itself also had 35% to 
45% narrowing.

 

SUBSELECTIVE ARTERIOGRAPHY OF THE LIMA GRAFT TO LAD:  

   The LIMA graft appeared to be widely patent with good anastomosis to the 
left anterior descending artery. The caliber of the left anterior descending 
artery was somewhat small in nature.  There was retrograde filling up to a more 
proximal diagonal branch small in caliber, competitive flow was seen in the 
proximal LAD.

 

OVERALL ASSESSMENT:  

   Significant coronary artery disease involving the distal left main with a 95
% obstruction.  There are patent grafts seen to the obtuse marginal branch via 
a vein graft and a patent LIMA graft to the LAD.  The right coronary artery has 
no significant coronary artery disease.  This information will be shared with 
Dr. Prado, the primary cardiologist as well as Dr. Saulo Ash, who is the 
cardiologist, who will be addressing the issue of the TAVR procedure.  Of note, 
the patient will restart his Coumadin to night at 7.5 mg daily and restart 
Lovenox tomorrow at 80 mg b.i.d..  He will continue his Coumadin at  7.5 mg 
daily and check an INR in Dr. Tab Sadler's office on Thursday of this week.  
With regards to his Lovenox, on Tuesday, he will take 80 mg b.i.d. as long as 
the groin is okay.  On Wednesday, he will take approximately 80 mg in the 
morning and 40 mg at night and have his INR checked on Thursday.

 

 [id number]/CPS #: [id number]

[facility] CenterASH

## 2019-06-29 ENCOUNTER — HOSPITAL ENCOUNTER (INPATIENT)
Dept: HOSPITAL 25 - ED | Age: 84
LOS: 2 days | Discharge: HOME | DRG: 65 | End: 2019-07-01
Attending: INTERNAL MEDICINE | Admitting: HOSPITALIST
Payer: MEDICARE

## 2019-06-29 ENCOUNTER — HOSPITAL ENCOUNTER (EMERGENCY)
Dept: HOSPITAL 25 - ED | Age: 84
Discharge: HOME | End: 2019-06-29
Payer: MEDICARE

## 2019-06-29 VITALS — DIASTOLIC BLOOD PRESSURE: 83 MMHG | SYSTOLIC BLOOD PRESSURE: 140 MMHG

## 2019-06-29 DIAGNOSIS — Z79.01: ICD-10-CM

## 2019-06-29 DIAGNOSIS — I65.29: ICD-10-CM

## 2019-06-29 DIAGNOSIS — R29.810: ICD-10-CM

## 2019-06-29 DIAGNOSIS — E03.9: ICD-10-CM

## 2019-06-29 DIAGNOSIS — R47.1: ICD-10-CM

## 2019-06-29 DIAGNOSIS — I25.2: ICD-10-CM

## 2019-06-29 DIAGNOSIS — R47.81: ICD-10-CM

## 2019-06-29 DIAGNOSIS — I25.10: ICD-10-CM

## 2019-06-29 DIAGNOSIS — D68.61: ICD-10-CM

## 2019-06-29 DIAGNOSIS — K21.9: ICD-10-CM

## 2019-06-29 DIAGNOSIS — I63.40: Primary | ICD-10-CM

## 2019-06-29 DIAGNOSIS — H81.09: ICD-10-CM

## 2019-06-29 DIAGNOSIS — Z79.899: ICD-10-CM

## 2019-06-29 DIAGNOSIS — Z88.8: ICD-10-CM

## 2019-06-29 DIAGNOSIS — R42: Primary | ICD-10-CM

## 2019-06-29 DIAGNOSIS — I44.7: ICD-10-CM

## 2019-06-29 DIAGNOSIS — Z80.0: ICD-10-CM

## 2019-06-29 DIAGNOSIS — I44.0: ICD-10-CM

## 2019-06-29 DIAGNOSIS — G83.21: ICD-10-CM

## 2019-06-29 DIAGNOSIS — I10: ICD-10-CM

## 2019-06-29 DIAGNOSIS — I50.30: ICD-10-CM

## 2019-06-29 DIAGNOSIS — Z95.2: ICD-10-CM

## 2019-06-29 DIAGNOSIS — Z95.1: ICD-10-CM

## 2019-06-29 DIAGNOSIS — E78.5: ICD-10-CM

## 2019-06-29 DIAGNOSIS — J98.11: ICD-10-CM

## 2019-06-29 DIAGNOSIS — Z91.041: ICD-10-CM

## 2019-06-29 DIAGNOSIS — Z96.641: ICD-10-CM

## 2019-06-29 LAB
ALBUMIN SERPL BCG-MCNC: 3.4 G/DL (ref 3.2–5.2)
ALBUMIN SERPL BCG-MCNC: 4 G/DL (ref 3.2–5.2)
ALBUMIN/GLOB SERPL: 1.5 {RATIO} (ref 1–3)
ALBUMIN/GLOB SERPL: 1.7 {RATIO} (ref 1–3)
ALP SERPL-CCNC: 41 U/L (ref 34–104)
ALP SERPL-CCNC: 51 U/L (ref 34–104)
ALT SERPL W P-5'-P-CCNC: 19 U/L (ref 7–52)
ALT SERPL W P-5'-P-CCNC: 24 U/L (ref 7–52)
ANION GAP SERPL CALC-SCNC: 2 MMOL/L (ref 2–11)
ANION GAP SERPL CALC-SCNC: 3 MMOL/L (ref 2–11)
APTT PPP: 33.2 SECONDS (ref 26–38)
APTT PPP: 34.3 SECONDS (ref 26–38)
AST SERPL-CCNC: 22 U/L (ref 13–39)
AST SERPL-CCNC: 27 U/L (ref 13–39)
BASOPHILS # BLD AUTO: 0 10^3/UL (ref 0–0.2)
BASOPHILS # BLD AUTO: 0 10^3/UL (ref 0–0.2)
BUN SERPL-MCNC: 27 MG/DL (ref 6–24)
BUN SERPL-MCNC: 32 MG/DL (ref 6–24)
BUN/CREAT SERPL: 27 (ref 8–20)
BUN/CREAT SERPL: 28.1 (ref 8–20)
CALCIUM SERPL-MCNC: 8.8 MG/DL (ref 8.6–10.3)
CALCIUM SERPL-MCNC: 9.6 MG/DL (ref 8.6–10.3)
CHLORIDE SERPL-SCNC: 104 MMOL/L (ref 101–111)
CHLORIDE SERPL-SCNC: 104 MMOL/L (ref 101–111)
CHOLEST SERPL-MCNC: 124 MG/DL
EOSINOPHIL # BLD AUTO: 0.6 10^3/UL (ref 0–0.6)
EOSINOPHIL # BLD AUTO: 0.7 10^3/UL (ref 0–0.6)
GLOBULIN SER CALC-MCNC: 2.2 G/DL (ref 2–4)
GLOBULIN SER CALC-MCNC: 2.4 G/DL (ref 2–4)
GLUCOSE SERPL-MCNC: 110 MG/DL (ref 70–100)
GLUCOSE SERPL-MCNC: 96 MG/DL (ref 70–100)
HCO3 SERPL-SCNC: 30 MMOL/L (ref 22–32)
HCO3 SERPL-SCNC: 34 MMOL/L (ref 22–32)
HCT VFR BLD AUTO: 41 % (ref 42–52)
HCT VFR BLD AUTO: 44 % (ref 42–52)
HDLC SERPL-MCNC: 53.3 MG/DL
HGB BLD-MCNC: 13.8 G/DL (ref 14–18)
HGB BLD-MCNC: 15.2 G/DL (ref 14–18)
INR PPP/BLD: 1.22 (ref 0.82–1.09)
INR PPP/BLD: 1.28 (ref 0.82–1.09)
LYMPHOCYTES # BLD AUTO: 1.3 10^3/UL (ref 1–4.8)
LYMPHOCYTES # BLD AUTO: 1.9 10^3/UL (ref 1–4.8)
MAGNESIUM SERPL-MCNC: 1.6 MG/DL (ref 1.9–2.7)
MAGNESIUM SERPL-MCNC: 1.8 MG/DL (ref 1.9–2.7)
MCH RBC QN AUTO: 30 PG (ref 27–31)
MCH RBC QN AUTO: 30 PG (ref 27–31)
MCHC RBC AUTO-ENTMCNC: 34 G/DL (ref 31–36)
MCHC RBC AUTO-ENTMCNC: 34 G/DL (ref 31–36)
MCV RBC AUTO: 88 FL (ref 80–94)
MCV RBC AUTO: 89 FL (ref 80–94)
MONOCYTES # BLD AUTO: 1.3 10^3/UL (ref 0–0.8)
MONOCYTES # BLD AUTO: 1.4 10^3/UL (ref 0–0.8)
NEUTROPHILS # BLD AUTO: 5.7 10^3/UL (ref 1.5–7.7)
NEUTROPHILS # BLD AUTO: 7.1 10^3/UL (ref 1.5–7.7)
NRBC # BLD AUTO: 0 10^3/UL
NRBC # BLD AUTO: 0 10^3/UL
NRBC BLD QL AUTO: 0
NRBC BLD QL AUTO: 0.1
PLATELET # BLD AUTO: 130 10^3/UL (ref 150–450)
PLATELET # BLD AUTO: 140 10^3/UL (ref 150–450)
POTASSIUM SERPL-SCNC: 3.9 MMOL/L (ref 3.5–5)
POTASSIUM SERPL-SCNC: 4.9 MMOL/L (ref 3.5–5)
PROT SERPL-MCNC: 5.6 G/DL (ref 6.4–8.9)
PROT SERPL-MCNC: 6.4 G/DL (ref 6.4–8.9)
RBC # BLD AUTO: 4.62 10^6 /UL (ref 4.18–5.48)
RBC # BLD AUTO: 5 10^6 /UL (ref 4.18–5.48)
SODIUM SERPL-SCNC: 137 MMOL/L (ref 135–145)
SODIUM SERPL-SCNC: 140 MMOL/L (ref 135–145)
TRIGL SERPL-MCNC: 116 MG/DL
TROPONIN I SERPL-MCNC: 0.07 NG/ML (ref ?–0.04)
TROPONIN I SERPL-MCNC: 0.11 NG/ML (ref ?–0.04)
TSH SERPL-ACNC: 15.84 MCIU/ML (ref 0.34–5.6)
WBC # BLD AUTO: 10.4 10^3/UL (ref 3.5–10.8)
WBC # BLD AUTO: 9.7 10^3/UL (ref 3.5–10.8)

## 2019-06-29 PROCEDURE — 99285 EMERGENCY DEPT VISIT HI MDM: CPT

## 2019-06-29 PROCEDURE — 85730 THROMBOPLASTIN TIME PARTIAL: CPT

## 2019-06-29 PROCEDURE — 93005 ELECTROCARDIOGRAM TRACING: CPT

## 2019-06-29 PROCEDURE — 70498 CT ANGIOGRAPHY NECK: CPT

## 2019-06-29 PROCEDURE — 70496 CT ANGIOGRAPHY HEAD: CPT

## 2019-06-29 PROCEDURE — G0378 HOSPITAL OBSERVATION PER HR: HCPCS

## 2019-06-29 PROCEDURE — 85610 PROTHROMBIN TIME: CPT

## 2019-06-29 PROCEDURE — 80053 COMPREHEN METABOLIC PANEL: CPT

## 2019-06-29 PROCEDURE — 99283 EMERGENCY DEPT VISIT LOW MDM: CPT

## 2019-06-29 PROCEDURE — 70551 MRI BRAIN STEM W/O DYE: CPT

## 2019-06-29 PROCEDURE — 85025 COMPLETE CBC W/AUTO DIFF WBC: CPT

## 2019-06-29 PROCEDURE — 84443 ASSAY THYROID STIM HORMONE: CPT

## 2019-06-29 PROCEDURE — 84479 ASSAY OF THYROID (T3 OR T4): CPT

## 2019-06-29 PROCEDURE — 70450 CT HEAD/BRAIN W/O DYE: CPT

## 2019-06-29 PROCEDURE — 84484 ASSAY OF TROPONIN QUANT: CPT

## 2019-06-29 PROCEDURE — 84439 ASSAY OF FREE THYROXINE: CPT

## 2019-06-29 PROCEDURE — 83036 HEMOGLOBIN GLYCOSYLATED A1C: CPT

## 2019-06-29 PROCEDURE — 81003 URINALYSIS AUTO W/O SCOPE: CPT

## 2019-06-29 PROCEDURE — 83880 ASSAY OF NATRIURETIC PEPTIDE: CPT

## 2019-06-29 PROCEDURE — 80048 BASIC METABOLIC PNL TOTAL CA: CPT

## 2019-06-29 PROCEDURE — 93306 TTE W/DOPPLER COMPLETE: CPT

## 2019-06-29 PROCEDURE — 71045 X-RAY EXAM CHEST 1 VIEW: CPT

## 2019-06-29 PROCEDURE — 80061 LIPID PANEL: CPT

## 2019-06-29 PROCEDURE — 83605 ASSAY OF LACTIC ACID: CPT

## 2019-06-29 PROCEDURE — 83735 ASSAY OF MAGNESIUM: CPT

## 2019-06-29 PROCEDURE — 36415 COLL VENOUS BLD VENIPUNCTURE: CPT

## 2019-06-29 RX ADMIN — WARFARIN SODIUM SCH MG: 10 TABLET ORAL at 17:03

## 2019-06-29 RX ADMIN — MECLIZINE HYDROCHLORIDE PRN MG: 12.5 TABLET ORAL at 17:07

## 2019-06-29 RX ADMIN — ENOXAPARIN SODIUM SCH MG: 100 INJECTION SUBCUTANEOUS at 17:03

## 2019-06-29 NOTE — HP
HISTORY AND PHYSICAL:

 

DATE OF ADMISSION:  19

 

ADMITTING PROVIDER:  Keyon Bowen MD

 

PRIMARY CARE PROVIDER:  Dr. Tab Sadler.

 

OUTPATIENT CARDIOLOGIST:  Dr. Prado.

 

OUTPATIENT NEUROLOGIST:  Dr. Turner.

 

CHIEF COMPLAINT:  Intermittent right hand numbness, slurred speech, 10/10 
headache in the setting of recent TAVR operation 3 days prior.

 

HISTORY OF PRESENT ILLNESS:  Wilmer Campbell is an 86-year-old male with past 
medical history of MI, CAD status post CABG in , antiphospholipid syndrome 
on Coumadin (though recently held in the setting of TAVR performed on 19, 
2 days to his prior admission at [facility] with Dr. Ash), carotid 
artery stenosis, TIAs and Menierre's disease.  His postoperative course for the 
TAVR had been complicated by what sounds like either pleural effusions or 
pulmonary edema and he has had increased diuresis.  His Coumadin had been held 
and restarted evening of 19. He returned home from [facility] last night 
and overall he has been relatively deconditioned. He had onset of a splitting 10
/10 headache around 6 p.m.  He seemed to be "out of it" on the couch.  He got 
some coffee and 2 Tylenol and woke up. Later on that night, his home health 
aide brought him to the emergency room for complaint of right hand numbness.  
He was found to be bradycardic to the 40s (of note, he has a new left bundle-
branch block documented at [facility] and a known first degree heart 
block).  He had an EKG and was discharged from the emergency room.  This morning
, his headache has continued and again up to 10/10.  He was at registration, 
about to check in again to the [facility], when again he had an episode 
of transient numbness of his right hand, though this time with some tingling 
sensation to remaining that has since resolved.  His speech has also been 
noticed to be more slurred and he has had a little bit of word finding 
difficulty.  His initial evaluation include a CT head noncontrast, which 
demonstrated some atrophy.  No definitive intracranial mass or hemorrhage was 
noted.  His INR was noted to be subtherapeutic at 1.22, his goal is 2 to 3 for 
his antiphospholipid syndrome.  His troponin which had been 0.11 last night in 
the emergency room went down to 0.07.  He denies any chest pain.  LDL was 48, 
HDL 53.  Glucose 110.  No leukocytosis.  No fevers.  Hemodynamically stable. 
Normotensive.  Dr. Turner was consulted by Dr. Black, who recommended 
initiation of either Lovenox or heparin to bridge his anticoagulation.  
Recommended for a CTA of the head and neck.  Of note, he has allergies to IV 
contrast (producing severe headaches).  So he needs to be premedicated for this 
with Solu-Medrol 125 mg over several hours before this can be performed.  On my 
evaluation, he does have some focal neurological deficits with some right 
nasolabial fold droopiness, some dysarthria(which family thinks is different 
from baseline speech), slight dysmetria on the right side.  Otherwise, his 
strength and sensation is intact. He frequently gets episodes of dizziness 
which he controls with meclizine.

 

PAST MEDICAL HISTORY:  MI; CAD, status post CABG in ; severe aortic stenosis
, status post TAVR on 19 with Dr. Ash at the [facility]; carotid 
artery stenosis; peripheral vascular disease; hyperlipidemia; hypertension; 
antiphospholipid syndrome, on chronic anticoagulation with INR goal 2 to 3; 
TIAs last year in 2018.  He suffers from Meniere's disease.

 

PAST SURGICAL HISTORY:  Include TAVR, CABG, right total hip in , open 
appendectomy, bilateral rotator cuff repairs.

 

MEDICATIONS:  Include:

1.  Magnesium oxide 500 mg p.o. daily.

2.  Cholecalciferol 5000 units p.o. daily.

3.  CBD oil 20 mg p.o. q.a.m.

4.  Coenzyme Q10 400 mg p.o. q.a.m.

5.  TriCor 145 mg p.o. daily.

6.  Meclizine 25 mg p.o. b.i.d. (this was later clarified to be QID per bottle)

7.  Glucosamine chondroitin 1 tab p.o. daily.

8.  Ferrous sulfate 325 mg p.o. daily.

9.  Calcium carbonate/vitamin D3 500 plus one tab p.o. daily.

10.  Gabapentin 300 mg p.o. t.i.d.

11.  Multivitamin 1 tab p.o. q.a.m.

12.  Vitamin B12 1000 mcg p.o. q.a.m.

13.  Biotin 5000 mcg p.o. q.a.m.

14.  Vitamin D3 5000 units p.o. daily.

15.  MiraLAX 17 g p.o. daily.

16.  Colestipol 2 g p.o. b.i.d.

17.  Vitamin C 2000 mg p.o. b.i.d.

18.  Warfarin 7.5 mg p.o. daily.

19.  Omeprazole 20 mg p.o. q.a.m.

20.  Fish oil 2000 mg p.o. b.i.d.

21.  Lasix 20 mg p.o. daily (occasionally 40 mg as needed).

22.  Synthroid 100 mcg p.o. daily.

23.  Temazepam 7.5 mg 1 to 2 tabs at bedtime.

 

ALLERGIES:  Include IV CONTRAST (severe headache, lasting for days); STATINS, 
muscle aches; NIACIN, flushing; BETA BLOCKERS, bradycardia; ZETIA, muscle aches.

 

FAMILY HISTORY:  His father  of a ruptured colon in 70s.  His mother  
of colon cancer at age 72.

 

SOCIAL HISTORY:  The patient is a never smoker.  Formerly rare alcohol use, 
none for the last 6 months.  No drug use.  He is retired from history of 
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 
available ultrasound technologies.  Medical surrogate is his daughter, Kimberly Rausch who is at bedside. He desires to be a full code for now until the 
anticipated passing of his wife who is suffering from Alzheimer's disease and 
recently enrolled in hospice and is expected only a few weeks left to live.

 

REVIEW OF SYSTEMS:

Complete 14 point review of systems negative except as per HPI.



                               PHYSICAL EXAMINATION

 

GENERAL APPEARANCE:  No acute distress, although he is wearing bilateral 
eyeshades.

 

VITAL SIGNS:  Temperature 98.1, pulse rate 64, respiratory rate 18, satting 96% 
on room air, blood pressure 140/69.

 

HEENT:  Normocephalic, atraumatic.  Pupils are equal, round and reactive to 
light. Extraocular motions intact.  No scleral icterus.

 

NECK:  Supple.  No cervical lymphadenopathy.

 

LUNGS:  Clear to auscultation bilaterally.  No wheezing, rales, or rhonchi.

 

CARDIOVASCULAR:  Rate intermittently bradycardic, intermittently ectopic. 
Otherwise, no murmurs, rubs, or gallops.

 

ABDOMEN:  Soft, but distended.  No rebound, no guarding.  No Pompa sign.

 

EXTREMITIES:  Warm, well perfused.  No peripheral edema.

 

NEURO:  He has right nasolabial droop, some slight dysarthria, slight 
dyssymmetry on the right side with finger-to-nose.  Rapid arm motions are 
intact.   strength is intact, upper arm strength testing limited by history 
of rotator cuff repairs and other shoulder surgeries.  Hip flexion is 5/5.  
Dorsi and plantarflexion of 5/5.  Mute Babinski's bilaterally.

 

 DIAGNOSTIC STUDIES/LAB DATA:  White count 10.4, hemoglobin 15.2, hematocrit 44
, platelets 140.  INR 1.22.  Sodium 140, potassium 4.9, chloride 104, carbon 
dioxide 34, BUN 27, creatinine 1.00, glucose 110, lactic acid 1.1.  Total bili 
0.8, AST 27, ALT 24, alk phos 51.  Troponin 0.07.  Albumin is 4.0, 
triglycerides 116, cholesterol 124, LDL 48, HDL 53.

 

Imaging:  Chest x-ray demonstrated a cardiomegaly, bibasilar atelectasis with a 
right infrahilar density for which a mass is not excluded.  Of note, on 18
, there is also noted to have a right infrahilar density likely representing 
scarring, although underlying infrahilar mass not excluded.  The only last CT 
scan on 02/15/16 demonstrated dense consolidative change in the right middle 
lobe with central cavitation, cavitary component unchanged.

 

CT noncontrast, which demonstrated atrophy and no definitive intracranial mass 
or hemorrhage noted.  EKG demonstrated normal sinus rhythm, left bundle branch 
block, QTc of 457.  No ST elevations, depressions.

 

ASSESSMENT AND PLAN:  Wilmer Campbell is an 86-year-old male with complicated 
cardiac history and antiphospholipid syndrome, he is 3 days out of TAVR, 
presenting with 2 episodes of right hand numbness, which were transient, 
lasting for 5 minutes and still with some persistent dysarthria, word finding 
difficulty, some slight dysmetria on the right, concerning for transient 
ischemic attack versus cerebrovascular accident in the setting of 
subtherapeutic anticoagulation for his antiphospholipid syndrome.  I discussed 
the case with Dr. Turner, who recommends therapeutic anticoagulation with 
either Lovenox or heparin, again start 80 mg of Lovenox b.i.d.  He is 
recommended CTA of the head and neck, this will be delayed somewhat given his 
requirement for premedication with Solu-Medrol.  Also, we are getting an MRI of 
the brain noncontrast and I am ordering an echocardiogram with bubble study.  
Of note, the bubble study did show some late bubbles and could not exclude 
possibility of transpulmonary shunt back on 19 with ejection fraction at 
that time was 50% to 55% and had severe aortic stenosis.  We also looked to 
rule out any intramural thrombus at that time.  He should be on telemetry.  No 
reported history of atrial fibrillation, but is intermittently bradycardic and 
with new left bundle branch block after a surgery. He is intolerant of statins, 
we will continue his TriCor.  His LDL is actually excellent at 48, HDL was 53.  
Ordering an A1c as he is only slightly hyperglycemic here at 110, his last was 
more than a year ago at 5.6.  In terms of his other medications, continue his 
ferrous sulfate for iron deficiency anemia.  Continue with Lasix 20mg daily for 
his chronic heart failure with preserved ejection fraction.  He is euvolemic on 
exam.  I will add a BNP.  For his headaches, continue his meclizine.  I am 
adding Tylenol 600 mg p.o. q.4 hours (limited to 4 g daily p.r.n.), magnesium 
oxide 800 mg, adding a magnesium level and Reglan as needed 10 mg t.i.d. p.r.n.
  Giving him 10 mg of warfarin daily, his home dose is usually 7.5mg daily to 
get him therapeutic a little bit more quickly and get INRs daily.  Will get 
physical therapy and speech and language pathology and nurse dysphagia screen, 
n.p.o. except for meds for now until that is done. Continue CBD oil if 
available. He is a full code.  Medical surrogate is his daughter, Kimberly Rausch.  

 

 

 

[id number]/CPS #: [id number]

[facility]LMIS

## 2019-06-29 NOTE — XMS REPORT
Continuity of Care Document (CCD)

 Created on:2019



Patient:Wilmer Campbell

Sex:Male

:1933

External Reference #:[id number]





Demographics







 Address  [address] [id number]

 

 Home Phone  [phone]

 

 Mobile Phone  [phone]

 

 Preferred Language  en

 

 Marital Status  Not  or 

 

 Rastafarian Affiliation  Unknown

 

 Race  White

 

 Ethnic Group  Not  or 









Author







 Name  Florencio Baker M.D.

 

 Address  [address]



   Unavailable



   [place] [address]









Care Team Providers







 Name  Role  Phone

 

 Tab Sadler MD  Care Team Information   Unavailable

 

 Tab Sadler MD  Primary Care Physician  Unavailable









Payers







 Date  Identification Numbers  Payment Provider  Subscriber

 

   Policy Number: [id number]  [place]  Wilmer Campbell









 PayID: [id number]  [id number]









 [place] [address]









   Policy Number: [id number]  [place]  Wilmer Campbell









 PayID: [id number]  [address] [id number]









 [place] [address]







Family History







 Date  Family Member(s)  Observation  Comments

 

   Father   due to Eskemacolitis  ()

 

   Mother   due to Colon Cancer  ()







Social History







 Type  Date  Description  Comments

 

 Birth Sex    Unknown  

 

 ETOH Use    Occasionally consumed wine in the past  

 

 Tobacco Use  Start: Unknown  Patient has never smoked  

 

 Smoking Status  Reviewed: 19  Patient has never smoked  







Allergies, Adverse Reactions, Alerts







 Active Allergies  Reaction  Severity  Comments  Date

 

 Contrast Dye      CT dye  2018

 

 Statins        2018







Medications







 Active Medications  SIG  Qnty  Indications  Ordering Provider  Date

 

 Warfarin Sodium  Take 1 Tablet By      Unknown  



              5mg  Mouth Every Day        



 Tablets          



           







Vital Signs







 Date  Vital  Result  Comment

 

 2018 10:58am  Intraocular Pressure Right Eye  11 mmHg  









 Intraocular Pressure Left Eye  12 mmHg  







Procedures







 Date  Code  Description  Status

 

 2019  13353  Oct Retina  Completed

 

 2019  71147  Eye Exam Est Intermediate  Completed

 

 2019  26560  Sensorimotor Examination W/Mult Measurements Ocular  
Completed



     Deviation  

 

 2019  85797  Eye Exam Est Intermediate  Completed

 

 2018  69127  Fundus Photography W/Interpretation & Report  Completed

 

 2018  31498  Sensorimotor Examination W/Mult Measurements Ocular  
Completed



     Deviation  

 

 2018  80990  Refraction  Completed

 

 2018  55474  Eye Exam New Comprehensive  Completed







Encounters







 Type  Date  Location  Provider  Dx  Diagnosis

 

 Office Visit  2019  Main Office  Dionisio Molina, OD  H11.153  [place],



   9:15a        bilateral









 H11.31  Conjunctival hemorrhage, right eye

 

 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







Plan of Treatment

2019 - Florencio Baker M.D.H35.373 Puckering of macula, bilateralFollow up
:6 mos full

## 2019-06-29 NOTE — XMS REPORT
Continuity of Care Document (CCD)

 Created on:2019



Patient:Richard Barker

Sex:Male

:1933

External Reference #:[id number]





Demographics







 Address  [address] [place] [address]

 

 Home Phone  [phone]

 

 Mobile Phone  [phone]

 

 Email Address  [email or web address]

 

 Preferred Language  en

 

 Marital Status  Not  or 

 

 Jain Affiliation  Unknown

 

 Race  White

 

 Ethnic Group  Not  or 









Author







 Name  Danielle Parekh









Support







 Name  Relationship  Address  Phone

 

 Kimberly Rausch  Unavailable  Unavailable  [phone]









Care Team Providers







 Name  Role  Phone

 

 Navdeep Klein MD  Care Team Information   Unavailable

 

 Tab Sadler MD  Primary Care Physician  Unavailable









Payers







 Date  Identification Numbers  Payment Provider  Subscriber

 

 Effective: 1989  Policy Number: [id number]  Medicare  Richard Barker









 Expires: 2019  PayID: [id number]  [address]









 [place] [address]









 Effective: 2019  Policy Number: [id number]  Medicare  Richard Barker









 PayID: [id number]  [address]









 [place] [address]









 Effective: 2013  Policy Number: [id number]  [facility]  Richard Barker









 PayID: [id number]  [address] [address]









 [place] [address]









 Effective: 2002  Policy Number: [id number]  [facility]  Richard Barker









 Expires: 2012  Group Number: [id number]  [address] [address]









 PayID: [id number]  [place] [address]







Problems







 Active Problems  Provider  Date

 

 Arteriosclerosis of autologous vein  Jaquelin Prado M.D.  Onset: 2013



 coronary artery bypass graft    

 

 Essential hypertension  Jaquelin Prado M.D.  Onset: 2013

 

 Aortic valve disorder  Jaquelin Prado M.D.  Onset: 2013

 

 Mixed hyperlipidemia  Jaquelin Prado M.D.  Onset: 2013

 

 Difficulty breathing  Alla Jensen MD  Onset: 2015

 

 Conduction disorder of the heart  Jaquelin Prado M.D.  Onset: 2015

 

 Arteriosclerosis of coronary artery  Jaquelin Prado M.D.  Onset: 2015



 bypass graft    

 

 Carotid artery occlusion  Jaquelin Prado M.D.  Onset: 2015

 

 Disorder of lung  Alla Jensen MD  Onset: 2016

 

 Localized, primary osteoarthritis of  Jenny Snyder MD  Onset: 2016



 the shoulder region    

 

 Full thickness rotator cuff tear  Jenny Snyder MD  Onset: 2016

 

 Edema  Jaquelin Prado M.D.  Onset: 2018

 

 Atherosclerosis of arteries of the  Shravan Guevara MD, [facility],  Onset: 03/15/
2018



 extremities  [facility]  

 

 Coronary arteriosclerosis in patient  Jaquelin Prado M.D.  Onset: 2019



 with history of previous myocardial    



 infarction    







Family History







 Date  Family Member(s)  Observation  Comments

 

   General  non contributory  

 

   Father   due to Cancer, Colon  ()

 

   Father  eshemtic colitis  

 

   Mother  Colon Cancer  

 

   Siblings  2  

 

   Siblings  one brother  of heart attack ; other sibling  



     alive  







Social History







 Type  Date  Description  Comments

 

 Birth Sex    Unknown  

 

 Marital Status      

 

 Lives With    Wife  

 

 Occupation    Retired  

 

 Occupation      

 

 Tobacco Use  Start: Unknown  Never Smoked Cigarettes  

 

 Smoking Status  Reviewed: 19  Never Smoked Cigarettes  

 

 ETOH Use    Rarely consumes wine  

 

 Tobacco Use  Start: Unknown  Patient has never smoked  

 

 Recreational Drug Use    Never Used Drugs  

 

 Exercise Type/Frequency    Does not exercise  







Allergies, Adverse Reactions, Alerts







 Active Allergies  Reaction  Severity  Comments  Date

 

 Niacin        10/28/2013

 

 Zocor      increased CPK  10/28/2013

 

 Zetia      muscle aches  10/28/2013

 

 Beta Adrenergic Blockers      dizzy and low heart rate  10/28/2013

 

 IV Dye  headache      10/28/2013

 

 Statins        2014







Medications







 Active Medications  SIG  Qnty  Indications  Ordering  Date



         Provider  

 

 Lovenox  80mg every 12  2units    Jaquelin Prado,  2019



       80mg/0.8ML  hours      M.D.  



 Solution          



           

 

 Co Q-10 Maximum  1 tab PO q day  90caps  E78.2  Jaquelin Prado,  2019



 Strength        M.D.  



        400mg Capsules          



           

 

 Lisinopril  1 by mouth every      Unknown  2019



          2.5mg  day        



 Tablets          



           

 

 Aleve  1 po every      Unknown  2018



     220mg Capsules  morning and 1 prn        



   in the afternoon        

 

 Praluent  1 injection sc  2ml    Jaquelin Prado,  02/15/2018



        75mg/ml  every 2 weeks      M.D.  



 Solution Pen-Inject          



           

 

 Colestid  3 tabs by mouth  540tabs    Jaquelin Prado,  2013



        1gm Tablets  twice a day      M.D.  



           

 

 Biotin  1 po qd      Unknown  



      5000mcg Tablets          



           

 

 Vitamin D3 Maximum  1 by mouth every      Unknown  



 Strength  day        



        5000Unit          



 Capsules          



           

 

 Melatonin  1 by mouth every      Unknown  



         10mg Capsules  night at bedtime        



           

 

 Multivitamins  1 by mouth every      Unknown  



              Capsules  day        



           

 

 Amoxicillin  4 tablets 1 hour      Unknown  



           500mg  before dental        



 Capsules  work        



           

 

 Vitamin B Complex  1 by mouth every      Unknown  



   day        



 Tablets          



           

 

 Calcium 500 + D  1 by mouth twice      Unknown  



   a day        



 500-125mg-Unit          



 Tablets          



           

 

 Ferrous Sulfate  take 1 tablet      Unknown  



   once daily.        



 325(65Fe) mg Tablets          



           

 

 Glucosamine  2 by mouth every      Unknown  



 Chondroitin 1500  day        



 Complex          



       1500Com          



 Capsules          



           

 

 Magnesium Oxide  1 by mouth every      Unknown  



               400mg  day        



 Tablets          



           

 

 CBD Oil  20mg 1 cap po      Unknown  



        Capsule  daily Am        



           

 

 Vitamin B-12  1 by mouth every      Unknown  



            1000mcg  day        



 Tablets Sub          



           

 

 Miralax  17 gm qd  1mon    Unknown  



       3350NF Packet          



           

 

 Vitamin C  4 po bid      Unknown  



         500mg Tablets          



           

 

 Coenzyme Q-10  1 po qd      Unknown  



             400mg          



 Capsules          



           

 

 Guaifenesin  2 tablet po b.i.d  60tabs    Unknown  



           400mg          



 Tablets          



           

 

 Levothyroxine Sodium  1 po qd  90tabs    Unknown  



           



 100mcg Tablets          



           

 

 Meclizine HCL  1 po qid as  90tabs    Unknown  



             25mg  needed        



 Tablets          



           

 

 Omeprazole  1 po qd  90caps    Unknown  



          20mg          



 Capsules DR Belcher  2-3 times daily  30caps    Unknown  



         300mg          



 Capsules          



           

 

 Lasix  1 po qd  90tabs    Aixa Yuan,  



     20mg Tablets        N.P.  



           

 

 Fish Oil  2 po bid      Unknown  



        1000mg          



 Capsules          



           

 

 Coumadin  as directed by  90daysup    Unknown  



        5mg Tablets  Dr. Darlow        



           









 History Medications









 Mephyton  one tablet by  1tabs    Gillian Yuan,  2017 -



       5mg Tablets  mouth today      MD  2017



   (17)        

 

 Oxycodone HCL  0.25 ml (5 mg) sl      Gillian Yuan,  2017 -



            10mg/0.5ML  q 6 hrs prn pain      MD  2017



 Concentrate          



           

 

 Temazepam  1 q hs prn      Unknown  2016 -



        15mg Capsules          2018



           

 

 Aspirin  1/2 po qd      Jaquelin Prado,  2013 -



      325mg Tablets        M.D.  2016



           

 

 Aspirin  2 po qd      Jaquelin Prado,  10/28/2013 -



      81mg Tablets        M.D.  2013



           

 

 Fenofibrate  1 po qd  14tabs    Unknown   -



          145mg Tablets          2018



           

 

 Prevalite  use 1 packet twice  180units    Unknown   -



        4gm Packet  a day        01/10/2015



           

 

 Morphine Sulfate ER  1 po bid  60tabs    Unknown   -



                  15mg          01/10/2015



 Tablets ER          



           

 

 Morphine Sulfate  1 by mouth twice a      Unknown   -



               15mg  day        2016



 Tablets          



           

 

 Morphine Sulfate  0.25ml 1-4 times      Unknown   -



 (Concentrate)  po qd prn        2016



            20mg/ml          



 Solution          



           

 

 Oxyfast  0.25ml 1-4 times      Unknown   -



   qd prn        Unknown

 

 Ibuprofen  as needed      Unknown   -



        200mg Tablets          2018



           

 

 Lidocaine Patch  5% applied Am and      Unknown   -



   taken off PM 12        2018



   hours apart( last        



   used 17)        

 

 Acetaminophen  1-2 tabs 3x a day      Unknown   -



            500mg  as needed        2018



 Tablets          



           

 

 Penicillin V Potassium  1 tablet by mouth      Unknown   -



   three a day for 10        2018



 500mg Tablets  days        



           

 

 Fenofibrate  1 by mouth every      Unknown   -



          145mg Tablets  day        2018



           

 

 Ibuprofen  as needed (pt      Unknown   -



        200mg Tablets  takes 600mg daily)        2018



           







Medications Administered in Office







 Medication  SIG  Qnty  Indications  Ordering Provider  Date

 

 Technetium TC 99M        Presley Harvey M.D.,  2019



 Tetrofosmin, Per Unit Dose Up        [facility], [facility]  



 To 40 Millicuries          



          Injection          



           

 

 Inj, Regadenoson, 0.1 MG        Mick Graves, DO [facility]  2017



                 Injection          



           

 

 Aminophylline        Mick Graves, DO [facility]  2017



      Injection          



           

 

 Technetium TC 99M        Mick Graves, DO [facility]  2017



 Tetrofosmin, Per Unit Dose Up          



 To 40 Millicuries          



          Injection          



           

 

 Triamcinolone (Kenalog)        Jenny Snyder MD  2016



                Injection          



           

 

 Technetium TC 99M        Presley Harvey M.D.,  03/10/2015



 Tetrofosmin, Per Unit Dose Up        [facility], [facility]  



 To 40 Millicuries          



          Injection          



           

 

 Technetium TC 99M        Jaquelin Prado M.D.  03/10/2015



 Tetrofosmin, Per Unit Dose Up          



 To 40 Millicuries          



          Injection          



           

 

 Depomedrol 80MG        Tera Cast M.D.  2014



        Injection          



           

 

 Inj, Regadenoson, 0.1 MG        Jaquelin Prado M.D.  10/11/2012



                 Injection          



           

 

 Technetium TC 99M        Jaquelin Prado M.D.  10/11/2012



 Tetrofosmin, Per Unit Dose Up          



 To 40 Millicuries          



          Injection          



           







Vital Signs







 Date  Vital  Result  Comment

 

 2019 12:51pm  Height  66.5 inches  5'6.50"









 Weight  182.00 lb  with shoes

 

 Heart Rate  64 /min  

 

 BP Systolic Sitting  120 mmHg  lue reg cuff

 

 BP Diastolic Sitting  58 mmHg  lue reg cuff

 

 BP Systolic Standing  120 mmHg  lue reg cuff

 

 BP Diastolic Standing  54 mmHg  lue reg cuff

 

 Respiratory Rate  14 /min  

 

 BMI (Body Mass Index)  28.9 kg/m2  

 

 Ejection Fraction  55-60%  









 2019 11:11am  Height  66.5 inches  5'6.50"









 Weight  184.00 lb  w/shoes

 

 Heart Rate  58 /min  

 

 BP Systolic Sitting  120 mmHg  Lue reg cuff

 

 BP Diastolic Sitting  58 mmHg  Lue reg cuff

 

 BP Systolic Standing  128 mmHg  Lue reg cuff

 

 BP Diastolic Standing  60 mmHg  Lue reg cuff

 

 Respiratory Rate  16 /min  

 

 BMI (Body Mass Index)  29.3 kg/m2  









 02/15/2019 12:57pm  Height  66.5 inches  5'6.50"









 Weight  180.00 lb  w/shoes

 

 Heart Rate  64 /min  

 

 BP Systolic Sitting  120 mmHg  Lue reg cuff

 

 BP Diastolic Sitting  80 mmHg  Lue reg cuff

 

 BP Systolic Standing  115 mmHg  Lue reg cuff

 

 BP Diastolic Standing  70 mmHg  Lue reg cuff

 

 Respiratory Rate  17 /min  

 

 BMI (Body Mass Index)  28.6 kg/m2  

 

 Ejection Fraction  55-60%  18 echo [facility]









 2019 10:36am  Height  66.5 inches  5'6.50"









 Weight  180.00 lb  with shoes

 

 Heart Rate  62 /min  

 

 BP Systolic Sitting  110 mmHg  lue large cuff

 

 BP Diastolic Sitting  54 mmHg  lue large cuff

 

 BP Systolic Standing  108 mmHg  lue large cuff

 

 BP Diastolic Standing  52 mmHg  lue large cuff

 

 Respiratory Rate  12 /min  

 

 BMI (Body Mass Index)  28.6 kg/m2  

 

 Ejection Fraction  55-60%  echo.18 10:03am  Height  66.5 inches  5'6.50"









 Weight  176.00 lb  with shoes

 

 Heart Rate  68 /min  

 

 BP Systolic Sitting  140 mmHg  Rue reg cuff

 

 BP Diastolic Sitting  66 mmHg  Rue reg cuff

 

 BP Systolic Standing  140 mmHg  Rue reg cuff

 

 BP Diastolic Standing  60 mmHg  Rue reg cuff

 

 Respiratory Rate  16 /min  

 

 BMI (Body Mass Index)  28.0 kg/m2  









 2018 10:17am  Height  66.5 inches  5'6.50"









 Weight  176.00 lb  w/shoes

 

 Heart Rate  66 /min  w/oximeter

 

 BP Systolic Sitting  130 mmHg  Lue reg cuff

 

 BP Diastolic Sitting  65 mmHg  Lue reg cuff

 

 BP Systolic Standing  120 mmHg  Lue reg cuff

 

 BP Diastolic Standing  60 mmHg  Lue reg cuff

 

 Respiratory Rate  18 /min  

 

 BMI (Body Mass Index)  28.0 kg/m2  

 

 Ejection Fraction  55-60%  18 echo









 2018  9:49am  Height  66.5 inches  5'6.50"









 Weight  178.00 lb  with shoes

 

 Heart Rate  56 /min  

 

 BP Systolic Sitting  128 mmHg  LA reg cuff

 

 BP Diastolic Sitting  58 mmHg  LA reg cuff

 

 BP Systolic Standing  136 mmHg  LA reg cuff

 

 BP Diastolic Standing  62 mmHg  LA reg cuff

 

 BMI (Body Mass Index)  28.3 kg/m2  

 

 Ejection Fraction  55%-60%  18  3:21pm  Height  66.5 inches  5'6.50"









 Weight  179.00 lb  with shoes

 

 Heart Rate  60 /min  

 

 BP Systolic Sitting  146 mmHg  Rue reg cuff

 

 BP Diastolic Sitting  78 mmHg  Rue reg cuff

 

 BP Systolic Standing  154 mmHg  Rue

 

 BP Diastolic Standing  84 mmHg  Rue

 

 Respiratory Rate  16 /min  

 

 BMI (Body Mass Index)  28.5 kg/m2  

 

 Ejection Fraction  55-60%  4/20/18









 03/15/2018  2:42pm  Height  66.5 inches  5'6.50"









 Weight  184.00 lb  w/ shoes

 

 Heart Rate  64 /min  

 

 BP Systolic Sitting  120 mmHg  lue large cuff

 

 BP Diastolic Sitting  58 mmHg  lue large cuff

 

 BP Systolic Standing  122 mmHg  lue large cuff

 

 BP Diastolic Standing  54 mmHg  lue large cuff

 

 Respiratory Rate  18 /min  

 

 BMI (Body Mass Index)  29.3 kg/m2  

 

 Ejection Fraction  55-60%  echo 2018  3:04pm  Height  66.5 inches  5'6.50"









 Weight  180.00 lb  with shoes

 

 Heart Rate  60 /min  

 

 BP Systolic Sitting  100 mmHg  Lue reg cuff

 

 BP Diastolic Sitting  54 mmHg  Lue reg cuff

 

 BP Systolic Standing  108 mmHg  Lue

 

 BP Diastolic Standing  68 mmHg  Lue

 

 Respiratory Rate  16 /min  

 

 BMI (Body Mass Index)  28.6 kg/m2  

 

 Ejection Fraction  55-60%  2017  3:46pm  Height  66.5 inches  5'6.50"









 Weight  179.00 lb  with shoes

 

 Heart Rate  70 /min  resting

 

 BP Systolic Sitting  120 mmHg  Rue reg cuff HR 74

 

 BP Diastolic Sitting  70 mmHg  Rue reg cuff HR 74

 

 BP Systolic Standing  124 mmHg  Rue reg cuff HR 68

 

 BP Diastolic Standing  64 mmHg  Rue reg cuff HR 68

 

 BP Systolic Lying Down  138 mmHg  Rue reg cuff HR 74

 

 BP Diastolic Lying Down  60 mmHg  Rue reg cuff HR 74

 

 Respiratory Rate  16 /min  

 

 BMI (Body Mass Index)  28.5 kg/m2  

 

 Ejection Fraction  50-55%  date 16 ECHO









 2017  2:43pm  Height  66.5 inches  5'6.50"









 Weight  177.00 lb  

 

 Heart Rate  64 /min  

 

 BP Systolic Sitting  132 mmHg  Lue reg cuff

 

 BP Diastolic Sitting  68 mmHg  Lue reg cuff

 

 BP Systolic Standing  126 mmHg  Lue

 

 BP Diastolic Standing  70 mmHg  Lue

 

 Respiratory Rate  16 /min  

 

 BMI (Body Mass Index)  28.1 kg/m2  

 

 Ejection Fraction  50-55%  16  2:52pm  Weight  179.00 lb  with shoes









 Heart Rate  60 /min  

 

 BP Systolic Sitting  110 mmHg  Lue reg cuff

 

 BP Diastolic Sitting  54 mmHg  Lue reg cuff

 

 BP Systolic Standing  118 mmHg  Lue reg cuff

 

 BP Diastolic Standing  68 mmHg  Lue reg cuff

 

 Respiratory Rate  17 /min  

 

 Ejection Fraction  50-55%  date 16 ECHO









 2017  1:04pm  Heart Rate  66 /min  









 BP Systolic Sitting  128 mmHg  

 

 BP Diastolic Sitting  64 mmHg  

 

 Respiratory Rate  18 /min  

 

 Body Temperature  97.0 F  









 2017  1:06pm  Height  66.5 inches  5'6.50"









 Weight  176.00 lb  

 

 Heart Rate  74 /min  

 

 BP Systolic  120 mmHg  

 

 BP Diastolic  70 mmHg  

 

 Respiratory Rate  16 /min  

 

 Body Temperature  97.5 F  

 

 BMI (Body Mass Index)  28.0 kg/m2  









 2017  2:22pm  Height  66.5 inches  5'6.50"









 Weight  175.00 lb  with shoes

 

 Heart Rate  60 /min  

 

 BP Systolic Sitting  130 mmHg  Rue reg cuff

 

 BP Diastolic Sitting  66 mmHg  Rue reg cuff

 

 BP Systolic Standing  128 mmHg  Rue reg cuff

 

 BP Diastolic Standing  70 mmHg  Rue reg cuff

 

 Respiratory Rate  17 /min  

 

 BMI (Body Mass Index)  27.8 kg/m2  

 

 Ejection Fraction  50-55%  date 16 ECHO









 2017 11:11am  Height  66.5 inches  5'6.50"









 Weight  180.00 lb  

 

 Heart Rate  60 /min  

 

 BP Systolic  126 mmHg  

 

 BP Diastolic  64 mmHg  

 

 Respiratory Rate  16 /min  

 

 Body Temperature  98.7 F  

 

 BMI (Body Mass Index)  28.6 kg/m2  









 2017  4:15pm  Height  66.5 inches  5'6.50"









 Weight  180.00 lb  

 

 Heart Rate  66 /min  

 

 BP Systolic  122 mmHg  

 

 BP Diastolic  62 mmHg  

 

 Respiratory Rate  16 /min  

 

 Body Temperature  97.2 F  

 

 Pain Level  4  

 

 BMI (Body Mass Index)  28.6 kg/m2  









 2017  2:28pm  Height  66.5 inches  5'6.50"









 Weight  182.00 lb  

 

 Heart Rate  56 /min  

 

 BP Systolic  112 mmHg  

 

 BP Diastolic  61 mmHg  

 

 Body Temperature  97.7 F  

 

 Pain Level  3  

 

 BMI (Body Mass Index)  28.9 kg/m2  









 2017  1:59pm  Height  66.5 inches  5'6.50"









 Weight  182.00 lb  

 

 Heart Rate  54 /min  

 

 BP Systolic Sitting  132 mmHg  

 

 BP Diastolic Sitting  58 mmHg  

 

 Respiratory Rate  16 /min  

 

 Body Temperature  96.9 F  

 

 Pain Level  2  

 

 BMI (Body Mass Index)  28.9 kg/m2  









 2017 11:01am  Height  66.5 inches  5'6.50"









 Weight  182.00 lb  

 

 Heart Rate  52 /min  

 

 BP Systolic Sitting  140 mmHg  right arm, reg cuff

 

 BP Diastolic Sitting  74 mmHg  right arm, reg cuff

 

 BP Systolic Standing  138 mmHg  right arm, reg cuff

 

 BP Diastolic Standing  74 mmHg  right arm, reg cuff

 

 Respiratory Rate  20 /min  

 

 BMI (Body Mass Index)  28.9 kg/m2  

 

 Ejection Fraction  50-55%  16 10:42am  Height  66.5 inches  5'6.50"









 Weight  180.00 lb  

 

 Heart Rate  60 /min  

 

 Respiratory Rate  16 /min  

 

 Pain Level  7  

 

 BMI (Body Mass Index)  28.6 kg/m2  









 2016  9:50am  Height  65 inches  5'5"









 Weight  165.00 lb  with out shoes

 

 Heart Rate  72 /min  

 

 BP Systolic  108 mmHg  LA reg cuff

 

 BP Diastolic  52 mmHg  LA reg cuff

 

 BP Systolic Standing  106 mmHg  LA reg cuff

 

 BP Diastolic Standing  56 mmHg  LA reg cuff

 

 Respiratory Rate  16 /min  

 

 BMI (Body Mass Index)  27.5 kg/m2  

 

 Ejection Fraction  60-65%  12/16/15









 2016 10:19am  Height  66.5 inches  5'6.50"









 Weight  165.38 lb  

 

 Heart Rate  74 /min  

 

 BP Systolic  102 mmHg  

 

 BP Diastolic  58 mmHg  

 

 Respiratory Rate  14 /min  

 

 O2 % BldC Oximetry  98 %  

 

 BMI (Body Mass Index)  26.3 kg/m2  









 2015  9:21am  Height  66.5 inches  5'6.50"









 Weight  175.69 lb  with shoes

 

 Heart Rate  60 /min  

 

 BP Systolic Sitting  114 mmHg  Ra reg cuff

 

 BP Diastolic Sitting  58 mmHg  Ra reg cuff

 

 BP Systolic Standing  110 mmHg  Ra reg cuff

 

 BP Diastolic Standing  60 mmHg  Ra reg cuff

 

 Respiratory Rate  16 /min  

 

 BMI (Body Mass Index)  27.9 kg/m2  









 2015 11:21am  Heart Rate  52 /min  









 BP Systolic Sitting  128 mmHg  

 

 BP Diastolic Sitting  60 mmHg  

 

 Respiratory Rate  18 /min  

 

 O2 % BldC Oximetry  98 %  









 2015 11:01am  Height  67 inches  5'7"









 Weight  179.00 lb  w/o shoes

 

 Heart Rate  56 /min  reg

 

 BP Systolic Sitting  122 mmHg  Ra, reg cuff

 

 BP Diastolic Sitting  60 mmHg  Ra, reg cuff

 

 BP Systolic Standing  120 mmHg  Ra

 

 BP Diastolic Standing  70 mmHg  Ra

 

 Respiratory Rate  18 /min  

 

 BMI (Body Mass Index)  28.0 kg/m2  









 2015  8:38am  Height  67 inches  5'7"









 Weight  174.00 lb  

 

 Heart Rate  58 /min  

 

 BP Systolic Sitting  118 mmHg  

 

 BP Diastolic Sitting  68 mmHg  

 

 Respiratory Rate  20 /min  

 

 O2 % BldC Oximetry  97 %  

 

 BMI (Body Mass Index)  27.2 kg/m2  









 2014 11:27am  Height  67 inches  5'7"









 Weight  187.00 lb  

 

 Heart Rate  57 /min  

 

 BP Systolic  110 mmHg  

 

 BP Diastolic  71 mmHg  

 

 BMI (Body Mass Index)  29.3 kg/m2  









 2014  8:41am  Height  67 inches  5'7"









 Heart Rate  71 /min  

 

 BP Systolic  123 mmHg  

 

 BP Diastolic  69 mmHg  









 2014  9:11am  Height  67 inches  5'7"









 Weight  187.00 lb  

 

 Heart Rate  60 /min  

 

 BP Systolic  136 mmHg  Ra reg cuff

 

 BP Diastolic  68 mmHg  Ra reg cuff

 

 BP Systolic Sitting  138 mmHg  LA reg cuff

 

 BP Diastolic Sitting  72 mmHg  LA reg cuff

 

 BP Systolic Standing  138 mmHg  LA

 

 BP Diastolic Standing  70 mmHg  LA

 

 Respiratory Rate  16 /min  

 

 BMI (Body Mass Index)  29.3 kg/m2  









 2014  1:53pm  Height  66 inches  5'6"









 Weight  188.00 lb  

 

 BMI (Body Mass Index)  30.3 kg/m2  









 2013  9:57am  Height  65.5 inches  5'5.50"









 Weight  187.50 lb  

 

 Heart Rate  68 /min  

 

 BP Systolic Sitting  132 mmHg  LA reg cuff

 

 BP Diastolic Sitting  66 mmHg  LA reg cuff

 

 BP Systolic Standing  124 mmHg  LA

 

 BP Diastolic Standing  60 mmHg  LA

 

 Respiratory Rate  18 /min  

 

 BMI (Body Mass Index)  30.7 kg/m2  









 2013  9:42am  Height  66 inches  5'6"









 Weight  183.00 lb  

 

 Heart Rate  69 /min  

 

 BP Systolic  125 mmHg  

 

 BP Diastolic  72 mmHg  

 

 BMI (Body Mass Index)  29.5 kg/m2  







Results







 Test  Date  Facility  Test  Result  H/L  Range  Note

 

 Laboratory test  2019  [facility]  Creatine  <pending>      



 finding    [address]  Kinase(CK)        



     [place] [address] [phone]          

 

 Inr/Protime  2019  [facility]  Inr  1.39  High  0.82-1.09  1, 
2



     [address]          



     [place] [address] [phone]          

 

 Lipid Panel -  2019  [facility]  Creatine  319 U/L  High  10-
223  



 [facility]    [address]  Kinase(CK)        



     [place] [address] [phone]          

 

 Comp Metabolic  2019  [facility]  Sodium  139 mmol/L  N  135-
145  



 Panel    [address]          



     [place] [address] [phone]          









 Potassium  4.1 mmol/L  N  3.5-5.0  

 

 Chloride  106 mmol/L  N  101-111  

 

 Co2 Carbon Dioxide  30 mmol/L  N  22-32  

 

 Anion Gap  3 mmol/L  N  2-11  

 

 Glucose  87 mg/dL  N    

 

 Blood Urea Nitrogen  21 mg/dL  N  6-24  

 

 Creatinine  0.83 mg/dL  N  0.67-1.17  

 

 BUN/Creatinine Ratio  25.3  High  8-20  

 

 Calcium  9.4 mg/dL  N  8.6-10.3  

 

 Total Protein  6.5 g/dL  N  6.4-8.9  

 

 Albumin  4.1 g/dL  N  3.2-5.2  

 

 Globulin  2.4 g/dL  N  2-4  

 

 Albumin/Globulin Ratio  1.7  N  1-3  

 

 Total Bilirubin  0.70 mg/dL  N  0.2-1.0  

 

 Alkaline Phosphatase  48 U/L  N    

 

 Alt  28 U/L  N  7-52  

 

 Ast  31 U/L  N  13-39  

 

 Egfr Non-  88.1    >60  

 

 Egfr   106.5    >60  3









 Lipid Profile  2019  [facility]  Triglycerides  115 mg/dL    
  4



 (Trig/Chol/HDL)    [address]          



     [place] [address] [phone]          









 Cholesterol  121 mg/dL      5

 

 HDL Cholesterol  45.1 mg/dL      6

 

 LDL Cholesterol  53 mg/dL      7









 CBC Auto Diff  2019  [facility]  White Blood  8.0 10^3/uL  N  
3.5-10.8  



     [address]        



     [place] [address] [phone]          









 Red Blood Count  4.88 10^6/uL  N  4.18-5.48  

 

 Hemoglobin  14.4 g/dL  N  14.0-18.0  

 

 Hematocrit  43 %  N  42-52  

 

 Mean Corpuscular Volume  88 fL  N  80-94  

 

 Mean Corpuscular Hemoglobin  30 pg  N  27-31  

 

 Mean Corpuscular HGB Conc  34 g/dL  N  31-36  

 

 Red Cell Distribution Width  15 %  N  10.5-15  

 

 Platelet Count  190 10^3/uL  N  150-450  

 

 Mean Platelet Volume  7.9 fL  N  7.4-10.4  

 

 Abs Neutrophils  5.3 10^3/uL  N  1.5-7.7  

 

 Abs Lymphocytes  1.3 10^3/uL  N  1.0-4.8  

 

 Abs Monocytes  0.9 10^3/uL  High  0-0.8  

 

 Abs Eosinophils  0.5 10^3/uL  N  0-0.6  

 

 Abs Basophils  0.0 10^3/uL  N  0-0.2  

 

 Abs Nucleated RBC  0.0 10^3/uL      

 

 Granulocyte %  66.5 %      

 

 Lymphocyte %  16.1 %      

 

 Monocyte %  11.1 %      

 

 Eosinophil %  5.9 %      

 

 Basophil %  0.4 %      

 

 Nucleated Red Blood Cells %  0.1      









 Inr/Protime  2019  [facility]  Inr  1.61  High  0.82-1.09  8



     [address]          



     [place] [address] [phone]          

 

 Laboratory test  2019  [facility]  Activated  43.3  High  26.0
-38.0  



 finding    [address]  Partial  seconds      



     [place] [address]  Thrombo Time        



     [phone]          

 

 Comprehensive  2018  N2N/CCD Import  A/G Ratio  1.6 CALC    0.6-2.3  



 Metabolic Prof              









 Albumin  4.3 g/dL    3.8-5.5  

 

 Alk. Phosphatase  64 U/L    22-95  

 

 Alt (SGPT)  32 U/L    7-35  

 

 Ast (Sgot)  31 U/L    5-34  

 

 BUN  18 mg/dL    6-26  

 

 BUN/Creat Ratio  22.5 CALC    8.0-36.0  

 

 Calcium  9.7 mg/dL    8.6-10.2  

 

 Carbon Dioxide  28 mEq/L    21-32  

 

 Chloride  103 mEq/L      

 

 Creatinine  0.8 mg/dL    0.6-1.4  

 

 GFR African American  >60 ml/min/1.73m^    >=60  

 

 GFR Non-African American  >60 ml/min/1.73m^    >=60  

 

 Globulin  2.7 g/dL    2.0-4.8  

 

 Glucose  98 mg/dL      

 

 Potassium  4.2 mEq/L    3.6-5.5  

 

 Sodium  139 mEq/L    134-149  

 

 Total Bilirubin  0.4 mg/dL    0.2-1.3  

 

 Total Protein  7.0 g/dL    6.4-8.3  









 Urine Drug  2018  [facility]  Amphetamine Ur  None Detected  
  None Detect  



 SCR ED &    [address]  Screen        



 [place] [address] [phone]          









 Barbiturates Urine Screen  None Detected    None Detect  

 

 Benzodiazepine Urine Screen  None Detected    None Detect  

 

 Urine Cannabinoids Screen  None Detected    None Detect  

 

 Urine Cocaine Screen  None Detected    None Detect  

 

 Urine Opiates Screen  None Detected    None Detect  

 

 Urine Phencyclidine Screen  None Detected    None Detect  9









 Urine Culture And  2018  [facility]  Urine Culture  SEE 
RESULT      10



 Sensitivities    [address]    BELOW      



     [place] [address] [phone]          

 

 CBC Auto Diff  2018  [facility]  White Blood  6.8 10^3/uL  N  
3.5-1  



     [address]  Count      0.8  



     [place] [address] [phone]          









 Red Blood Count  5.01 10^6/uL  N  4.0-5.4  

 

 Hemoglobin  14.3 g/dL  N  14.0-18.0  

 

 Hematocrit  43 %  N  42-52  

 

 Mean Corpuscular Volume  86 fL  N  80-94  

 

 Mean Corpuscular Hemoglobin  29 pg  N  27-31  

 

 Mean Corpuscular HGB Conc  33 g/dL  N  31-36  

 

 Red Cell Distribution Width  14 %  N  10.5-15  

 

 Platelet Count  240 10^3/uL  N  150-450  

 

 Mean Platelet Volume  7.4 um3  N  7.4-10.4  

 

 Abs Neutrophils  4.2 10^3/uL  N  1.5-7.7  

 

 Abs Lymphocytes  1.3 10^3/uL  N  1.0-4.8  

 

 Abs Monocytes  0.7 10^3/uL  N  0-0.8  

 

 Abs Eosinophils  0.5 10^3/uL  N  0-0.6  

 

 Abs Basophils  0 10^3/uL  N  0-0.2  

 

 Abs Nucleated RBC  0 10^3/uL      

 

 Granulocyte %  61.7 %  N  38-83  

 

 Lymphocyte %  19.5 %  Low  25-47  

 

 Monocyte %  10.6 %  High  0-7  

 

 Eosinophil %  7.6 %  High  0-6  

 

 Basophil %  0.6 %  N  0-2  

 

 Nucleated Red Blood Cells %  0      









 Inr/Protime  2018  [facility]  Inr  1.89  High  0.77-1.02  



     [address]          



     [place] [address] [phone]          

 

 Laboratory test  2018  [facility]  Partial  38.2  High  26.0-
36.3  



 finding    [address]  Thrombo  seconds      



     [place] [address]  Time PTT        



     [phone]          









 Lactic Acid  1.2 mmol/L  N  0.5-2.0  11









 Comp Metabolic Panel  2018  [facility]  Sodium  142 mmol/L  N
  139-145  



     [address]          



     [place] [address]          



     [phone]          









 Potassium  4.1 mmol/L  N  3.5-5.0  

 

 Chloride  106 mmol/L  N  101-111  

 

 Co2 Carbon Dioxide  32 mmol/L  N  22-32  

 

 Anion Gap  4 mmol/L  N  2-11  

 

 Glucose  82 mg/dL  N    

 

 Blood Urea Nitrogen  18 mg/dL  N  6-24  

 

 Creatinine  0.90 mg/dL  N  0.67-1.17  

 

 BUN/Creatinine Ratio  20.0  N  8-20  

 

 Calcium  9.2 mg/dL  N  8.6-10.3  

 

 Total Protein  6.0 g/dL  Low  6.4-8.9  

 

 Albumin  3.6 g/dL  N  3.2-5.2  

 

 Globulin  2.4 g/dL  N  2-4  

 

 Albumin/Globulin Ratio  1.5  N  1-3  

 

 Total Bilirubin  0.40 mg/dL  N  0.2-1.0  

 

 Alkaline Phosphatase  58 U/L  N    

 

 Alt  21 U/L  N  7-52  

 

 Ast  23 U/L  N  13-39  

 

 Egfr Non-  80.4    >60  

 

 Egfr   103.4    >60  12









 Lipid Profile  2018  [facility]  Triglycerides  136 mg/dL    
  13



 (Trig/Chol/HDL)    [address]          



     [place], [place] [address] [phone]          









 Cholesterol  167 mg/dL      14

 

 HDL Cholesterol  41.6 mg/dL      15

 

 LDL Cholesterol  98 mg/dL      16









 Laboratory test  2018  [facility]  Troponin-I (TnI)  0.01 ng/
mL    <0.04  



 finding    [address]          



     [place] [address] [phone]          









 Alcohol  < 10 mg/dL  N  <10  









 Urinalysis Profile  2018  [facility]  Urine Color  Nohelia      



     [address]          



     [place] [address] [phone]          









 Urine Appearance  Cloudy      

 

 Urine Specific Gravity  1.012  N  1.010-1.030  

 

 Urine pH  6.0  N  5-9  

 

 Urine Urobilinogen  Negative    Negative  

 

 Urine Ketones  Negative    Negative  

 

 Urine Protein  Negative    Negative  

 

 Urine Leukocytes  Trace  Abnormal  Negative  

 

 Urine Blood  Negative    Negative  

 

 *  *  Abnormal  Negative  17

 

 Urine Nitrite  Negative    Negative  

 

 Urine Bilirubin  Negative    Negative  

 

 Urine Glucose  Negative    Negative  

 

 Urine White Blood Cell  Trace(0-5/hpf)    Absent  

 

 Urine Red Blood Cell  Absent    Absent  

 

 Urine Bacteria  Absent    Absent  

 

 Urine Squamous Epithelial Cell  Present  Abnormal  Absent  









 CBC Auto Diff  2017  [facility]  White Blood  7.9 10^3/uL  N  
3.5-10.8  



     [address]  Count        



     [place] [address] [phone]          









 Red Blood Count  4.76 10^6/uL  N  4.0-5.4  

 

 Hemoglobin  13.1 g/dL  Low  14.0-18.0  

 

 Hematocrit  39 %  Low  42-52  

 

 Mean Corpuscular Volume  82 fL  N  80-94  

 

 Mean Corpuscular Hemoglobin  28 pg  N  27-31  

 

 Mean Corpuscular HGB Conc  33 g/dL  N  31-36  

 

 Red Cell Distribution Width  17 %  High  10.5-15  

 

 Platelet Count  257 10^3/uL  N  150-450  

 

 Mean Platelet Volume  8 um3  N  7.4-10.4  

 

 Abs Neutrophils  4.6 10^3/uL  N  1.5-7.7  

 

 Abs Lymphocytes  1.7 10^3/uL  N  1.0-4.8  

 

 Abs Monocytes  1.0 10^3/uL  High  0-0.8  

 

 Abs Eosinophils  0.6 10^3/uL  N  0-0.6  

 

 Abs Basophils  0.1 10^3/uL  N  0-0.2  

 

 Abs Nucleated RBC  0 10^3/uL      

 

 Granulocyte %  57.7 %  N  38-83  

 

 Lymphocyte %  21.3 %  Low  25-47  

 

 Monocyte %  12.8 %  High  1-9  

 

 Eosinophil %  7.6 %  High  0-6  

 

 Basophil %  0.6 %  N  0-2  

 

 Nucleated Red Blood Cells %  0      









 Laboratory test  2017  [facility]  TSH (Thyroid  0.07  Low  
0.34-5.60  



 finding    [address]  Stim Horm)  mcIU/mL      



     [place] [address] [phone]          









 Free T4 (Free Thyroxine)  1.77 ng/dL  High  0.61-1.12  









 Inr/Protime  2017  [facility]  Inr  1.41  High  0.89-1.11  



     [address]          



     [place] [address] [phone]          

 

 Laboratory test  2017  [facility]  B-Type  119  High    18



 finding    [address]  Natriuretic  pg/mL      



     [place] [address]  Peptide BNP        



     [phone]          









 Troponin I  0.01 ng/mL    <0.04  









 Comp Metabolic Panel  2017  [facility]  Sodium  138 mmol/L  N
  133-145  



     [address]          



     [place] [address] [phone]          









 Potassium  4.0 mmol/L  N  3.5-5.0  

 

 Chloride  105 mmol/L  N  101-111  

 

 Co2 Carbon Dioxide  27 mmol/L  N  22-32  

 

 Anion Gap  6 mmol/L  N  2-11  

 

 Glucose  111 mg/dL  High    

 

 Blood Urea Nitrogen  26 mg/dL  High  6-24  

 

 Creatinine  1.01 mg/dL  N  0.67-1.17  

 

 BUN/Creatinine Ratio  25.7  High  8-20  

 

 Calcium  9.5 mg/dL  N  8.6-10.3  

 

 Total Protein  6.5 g/dL  N  6.4-8.9  

 

 Albumin  4.2 g/dL  N  3.2-5.2  

 

 Globulin  2.3 g/dL  N  2-4  

 

 Albumin/Globulin Ratio  1.8  N  1-3  

 

 Total Bilirubin  0.50 mg/dL  N  0.2-1.0  

 

 Alkaline Phosphatase  38 U/L  N    

 

 Alt  21 U/L  N  7-52  

 

 Ast  28 U/L  N  13-39  

 

 Egfr Non-  70.4    >60  

 

 Egfr   90.5    >60  19









 Cardiolipin  2012  [facility]  Cardiolipin IgG  4.6 GPL      
20



 Igg,Igm,Iga AB    [address]          



     [place] [address] [phone]          









 Cardiolipin IgM  <4.0 MPL      21

 

 Cardiolipin IgA  <4.0 APL      22









 Lupus  2012  [facility]  Prothrombin  16.9 sec  Abnormal    23



 Anticoagulant AB    [address]  Time(Lac)        



     [place] [address] [phone]          









 Lac Inr  1.6      

 

 Lac Aptt  34 sec    26 - 36  

 

 Lac DRVVT Screen Ratio  1.1 ratio    0.0 - 1.1  

 

 Lupus Anticoagulant Interpreta  See Comment      24

 

 Lupus Anticoagulant Review By  See Comment      25









 1  CALL RESTULS TO EXT 3989

 

 2  Standard intensity warfarin therapeutic range: 2.0-3.0



   High intensity warfarin therapeutic range: 2.5-3.5

 

 3  *******Because ethnic data is not always readily available,



   this report includes an eGFR for both -Americans and



   non- Americans.****



   The National Kidney Disease Education Program (NKDEP) does



   not endorse the use of the MDRD equation for patients that



   are not between the ages of 18 and 70, are pregnant, have



   extremes of body size, muscle mass, or nutritional status,



   or are non- or non-.



   According to the National Kidney Foundation, irrespective of



   diagnosis, the stage of the disease is based on the level of



   kidney function:



   Stage Description                      GFR(mL/min/1.73 m(2))



   1     Kidney damage with normal or decreased GFR       90



   2     Kidney damage with mild decrease in GFR          60-89



   3     Moderate decrease in GFR                         30-59



   4     Severe decrease in GFR                           15-29



   5     Kidney failure                       <15 (or dialysis)

 

 4  Desirable: <150



   Borderline High: 150-199



   High: 200-499



   Very High: >500

 

 5  Desirable: <200



   Borderline High: 200-239



   High: >239

 

 6  Low: <40



   Desirable: 40-60



   High: >60

 

 7  Desirable: <100



   Near Optimal: 100-129



   Borderline High: 130-159



   High: 160-189



   Very High: >189

 

 8  Standard intensity warfarin therapeutic range: 2.0-3.0



   High intensity warfarin therapeutic range: 2.5-3.5

 

 9  The urine specimen was tested at the listed cutoffs:



   Drug class                       test level



   (ng/mL)



   Amphetamines                        500



   Barbiturates                        200



   Benzodiazepine metabolites          200



   Cocaine metabolites                 150



   Cannabinoids                         50



   Opiates                             300



   Pcp                                  25



   



   Specimen was received without chain of custody. Results



   should be used for medical purposes only.

 

 10  SEE RESULT BELOW



   -----------------------------------------------------------------------------
---------------



   Name:  RICHARD BAKRER MONIQUE             : 1933    Attend Dr: Sobeida Ocampo MD



   Acct:  [id number]  Unit: [id number]  AGE: 84            Location:  [place] [address]



   Re18                        SEX: M             Status:    ADM Thom



   -----------------------------------------------------------------------------
---------------



   



   SPEC: [id number]         HEATHER:       [facility] DR: Brianna Hamm MD



   REQ:  [id number]              RECD:   



   STATUS: COMP             ALECIA DR: Tab Prado MD



   _



   SOURCE: URINE          SPDESC:



   ORDERED:  Urine Culture



   



   -----------------------------------------------------------------------------
---------------



   Procedure                         Result                         Reported   
        Site



   -----------------------------------------------------------------------------
---------------



   Urine Culture  Final                                             18-
1329      ML



   No Growth (<1,000 CFU/mL)



   



   -----------------------------------------------------------------------------
---------------



   * ML - Main Lab



   .



   



   



   



   



   



   



   



   



   



   



   



   



   



   



   



   



   



   



   



   



   



   



   



   



   



   



   ** END OF REPORT **



   



   DEPARTMENT OF PATHOLOGY,  [address] [place] [address]



   Phone # [phone]      Fax #[phone]



   Jose Grijalva M.D. Director     [facility] # [id number]

 

 11  [facility] Severe Sepsis and Septic Shock Management Bundle Measure



   requires all lactic acids initially measuring >2.0 mmol/L be



   repeated.

 

 12  *******Because ethnic data is not always readily available,



   this report includes an eGFR for both -Americans and



   non- Americans.****



   The National Kidney Disease Education Program (NKDEP) does



   not endorse the use of the MDRD equation for patients that



   are not between the ages of 18 and 70, are pregnant, have



   extremes of body size, muscle mass, or nutritional status,



   or are non- or non-.



   According to the National Kidney Foundation, irrespective of



   diagnosis, the stage of the disease is based on the level of



   kidney function:



   Stage Description                      GFR(mL/min/1.73 m(2))



   1     Kidney damage with normal or decreased GFR       90



   2     Kidney damage with mild decrease in GFR          60-89



   3     Moderate decrease in GFR                         30-59



   4     Severe decrease in GFR                           15-29



   5     Kidney failure                       <15 (or dialysis)

 

 13  Desirable: <150



   Borderline High: 150-199



   High: 200-499



   Very High: >500

 

 14  Desirable: <200



   Borderline High: 200-239



   High: >239

 

 15  Low: <40



   Desirable: 40-60



   High: >60

 

 16  Desirable: <100



   Near Optimal: 100-129



   Borderline High: 130-159



   High: 160-189



   Very High: >189

 

 17  *Ascorbic acid is present which may interfere with detection



   of blood.

 

 18  >100 to <200 pg/mL: likely compensated congestive heart



   failure (CHF)



   200 to 400 pg/mL: likely moderate CHF



   >400 pg/mL: likely moderate to severe CHF

 

 19  *******Because ethnic data is not always readily available,



   this report includes an eGFR for both -Americans and



   non- Americans.****



   The National Kidney Disease Education Program (NKDEP) does



   not endorse the use of the MDRD equation for patients that



   are not between the ages of 18 and 70, are pregnant, have



   extremes of body size, muscle mass, or nutritional status,



   or are non- or non-.



   According to the National Kidney Foundation, irrespective of



   diagnosis, the stage of the disease is based on the level of



   kidney function:



   Stage Description                      GFR(mL/min/1.73 m(2))



   1     Kidney damage with normal or decreased GFR       90



   2     Kidney damage with mild decrease in GFR          60-89



   3     Moderate decrease in GFR                         30-59



   4     Severe decrease in GFR                           15-29



   5     Kidney failure                       <15 (or dialysis)

 

 20  -- REFERENCE VALUE --



   <10.0 (Negative)



   R

 

 21  -- REFERENCE VALUE --



   <10.0 (Negative)



   R

 

 22  -- REFERENCE VALUE --



   <10.0 (Negative)



   Test Performed by:



   [place] [address]



   : Adam Claros III, M.D.



   R

 

 23  -- REFERENCE VALUE --



   10.3 - 12.8



   R

 

 24  IMPRESSION: 1) No evidence of a lupus anticoagulant (LAC).



   2) Probable warfarin effect.



   COMMENTS:  The prolonged prothrombin time (PT), which



   corrects with mixing studies, is consistent with factor



   deficiency (e.g., oral anticoagulation, vitamin K



   deficiency, liver disease, etc.).  Suggest clinical



   correlation. Normal dilute Chetan viper venom time (DRVVT)



   screen ratio provides no evidence of a lupus anticoagulant



   (LAC) by this methodology. To complete the evaluation, if



   clinically indicated, consider testing for IgG and IgM



   anticardiolipin and anti-beta 2 glycoprotein 1 antibodies.



   R

 

 25  RESULT: Primo Pulido M.D., Ph.D.



   Test Performed by:



   [place] [id number]



   : Adam Claros III, M.D.



   R







Procedures







 Date  Code  Description  Status

 

 2019  52727  Coronary Angiography With Catheter Placement In Bpyass  
Completed



     Grafts  

 

 2019  12657  Moderate Sedation Services; Same Phys Intl 15 Mins; PT >=5  
Completed



     Years  

 

 2019  44175  Color Flow Doppler/Interp & Reprt  Completed

 

 2019  28033  Pulse Wave/Continuous-Interp.RPT  Completed

 

 2019  08821  Echocardiography, Transesophageal, Real Time W/Image 2D  
Completed



     W/W/O M-M  

 

 2019  17826  EKG Tracing & Interpretation  Completed

 

 2019  42389  ECHO Transthoracic, Real-Time 2D With Doppler And Color  
Completed



     Flow  

 

 2019  65371  ECHO Transthoracic, Real-Time 2D With Doppler And Color  
Completed



     Flow  

 

 2019  38472  Implantable Cardio System Loop Recorder Sys Remota Data  
Completed



     Acquistio  

 

 2019  30539  Interrogation Dev Loop Recorder Incl Physician  Completed



     Analysis,Rev,Repor  

 

 2019  49510  Implantable Cardio System Loop Recorder Sys Remota Data  
Completed



     Acquistio  

 

 2019  24846  Interrogation Dev Loop Recorder Incl Physician  Completed



     Analysis,Rev,Repor  

 

 2019  29827  Stress Test  Completed

 

 2019  42755  Myocardial Perfusion Imaging Tomographic (Spect) Multiple  
Completed



     Studies  

 

 2019  62631  Interrogation Dev Loop Recorder Incl Physician  Completed



     Analysis,Rev,Repor  

 

 2019  61754  Implantable Cardio System Loop Recorder Sys Remota Data  
Completed



     Acquistio  

 

 2019  72832  Implantable Cardio System Loop Recorder Sys Remota Data  
Completed



     Acquistio  

 

 2019  11301  Interrogation Dev Loop Recorder Incl Physician  Completed



     Analysis,Rev,Repor  

 

 2018  20789  Stress ECHO Interpretation/Report Hospital  Completed

 

 2018  07153  Treadmill Interp/Report Only  Completed

 

 2018  40641  Stress Test Supervsn W/Out I/R  Completed

 

 2018  76454  EKG Tracing & Interpretation  Completed

 

 2018  67598  Implantable Cardio System Loop Recorder Sys Remota Data  
Completed



     Acquistio  

 

 2018  72927  Interrogation Dev Loop Recorder Incl Physician  Completed



     Analysis,Rev,Repor  

 

 2018  67442  EKG Tracing & Interpretation  Completed

 

 10/31/2018  16630  Implantable Cardio System Loop Recorder Sys Remota Data  
Completed



     Acquistio  

 

 10/31/2018  40473  Interrogation Dev Loop Recorder Incl Physician  Completed



     Analysis,Rev,Repor  

 

 2018  96811  Implantable Cardio System Loop Recorder Sys Remota Data  
Completed



     Acquistio  

 

 2018  92768  Interrogation Dev Loop Recorder Incl Physician  Completed



     Analysis,Rev,Repor  

 

 2018  59486  Implant Cardiac Loop Recorder  Completed

 

 2018  26149  EKG Tracing & Interpretation  Completed

 

 2018  81163  ECHO Transthorasic Realtime 2D W Doppler & Color Flow Hosp  
Completed

 

 2018  41296  ECHO Stress Test Incl Perf Contiuous ekg Monitoring W/Phys  
Completed



     Superv  

 

 2017  75306  ECHO Transthorasic Realtime 2D W Doppler & Color Flow Hosp  
Completed

 

 2017  20904  EKG Tracing & Interpretation  Completed

 

 2017  16870  Stress Test  Completed

 

 2017  15303  Myocardial Perfusion Imaging Tomographic (Spect) Multiple  
Completed



     Studies  

 

 2017  73416  Repair Hernia Inguinal > 5Yrs, Reducible  Completed

 

 2017  40997  Repair Hernia Inguinal > 5Yrs, Reducible  Completed

 

 2017  16862  EKG Tracing & Interpretation  Completed

 

 2017  77508  EKG Tracing & Interpretation  Completed

 

 2016  74256  ECHO Transthorasic Realtime 2D W Doppler & Color Flow Hosp  
Completed

 

 2016  37676  Treadmill Interp/Report Only  Completed

 

 2016  42346  Stress Test Supervsn W/Out I/R  Completed

 

 2016  87410  ECHO Transthoracic, Real-Time 2D With Doppler And Color  
Completed



     Flow  

 

 2016  62428  Inject/Drain Joint/Bursa Major W/O US  Completed

 

 02/15/2016  91793  Stress Test  Completed

 

 2015  82825  ECHO Transthoracic, Real-Time 2D With Doppler And Color  
Completed



     Flow  

 

 12/15/2015  01958  Carotid Doppler,Bilateral  Completed

 

 2015  61190  EKG Tracing & Interpretation  Completed

 

 2015  97812  Diffusing Capacity  Completed

 

 2015  66904  Plethysmography Determination Lung Volumes & Per Airway  
Completed



     Resist  

 

 2015  64462  Pulmonary Stress Test Simple  Completed

 

 2015  34818  EKG Tracing & Interpretation  Completed

 

 2015  62653  ECHO Transthoracic, Real-Time 2D With Doppler And Color  
Completed



     Flow  

 

 03/10/2015  50379  Stress Test  Completed

 

 03/10/2015  97433  Myocardial Perfusion Imaging Tomographic (Spect) Multiple  
Completed



     Studies  

 

 2014  23617  EKG Tracing & Interpretation  Completed

 

 2014  01965  Inject/Drain Joint/Bursa Major W/O US  Completed

 

 2014  47672  ECHO Transthoracic, Real-Time 2D With Doppler And Color  
Completed



     Flow  

 

 2013  84680  ECHO Transthoracic, Real-Time 2D With Doppler And Color  
Completed



     Flow  

 

 10/11/2012  26537  Stress Test  Completed

 

 10/11/2012  86508  Myocardial Perfusion Imaging Tomographic (Spect) Multiple  
Completed



     Studies  







Encounters







 Type  Date  Location  Provider  Dx  Diagnosis

 

 Office Visit  2019  [facility]  Jaquelin Prado,  I35.0  
Nonrheumatic aortic



   11:15a  Of [facility]  M.D.    (valve) stenosis









 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

 

 D68.61  Antiphospholipid syndrome

 

 I10  Essential (primary) hypertension

 

 E78.2  Mixed hyperlipidemia









 Office Visit  02/15/2019  [facility]  Jaquelin Prado,  I25.810  Atherosclerosis of



   1:10p  Cardiology Of  M.D.    CABG w/o angina



     Cma      pectoris









 I35.0  Nonrheumatic aortic (valve) stenosis









 Office Visit  2019 10:30a  [facility]  Jaquelin Prado  I35.0  
Nonrheumatic



     Of [facility]  M.D.    aortic (valve)



           stenosis









 R94.39  Abnormal result of other cardiovascular function study

 

 D68.61  Antiphospholipid syndrome

 

 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









 Office Visit  2018 11:00a  [facility]  Aixa VELIZ  I35.0  
Nonrheumatic



     Of [facility]  Foster, N.P.    aortic (valve)



           stenosis









 R00.0  Tachycardia, unspecified

 

 I10  Essential (primary) hypertension

 

 G45.9  Transient cerebral ischemic attack, unspecified

 

 E78.2  Mixed hyperlipidemia









 Office Visit  2018 10:30a  [facility]  Aixa VELIZ  E78.2  Mixed hyperlipidemia



     Cardiology Of  Foster, N.P.    



     Cma      









 I10  Essential (primary) hypertension

 

 G45.9  Transient cerebral ischemic attack, unspecified

 

 D68.61  Antiphospholipid syndrome

 

 I49.1  Atrial premature depolarization









 Office Visit  2018 10:00a  [facility]  Aixa STodd  G45.9  Transient 
cerebral



     Of Cma  Foster, N.P.    ischemic attack,



           unspecified









 E78.2  Mixed hyperlipidemia

 

 I10  Essential (primary) hypertension









 Office Visit  2018  3:20p  [facility]  Jaquelin Prado  G45.9  
Transient



     Of Cma  M.D.    cerebral ischemic



           attack,



           unspecified









 D68.61  Antiphospholipid syndrome

 

 I10  Essential (primary) hypertension

 

 E78.2  Mixed hyperlipidemia

 

 I35.0  Nonrheumatic aortic (valve) stenosis

 

 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

 

 I65.23  Occlusion and stenosis of bilateral carotid arteries









 Office Visit  2018  Neurohospitalist  Argenis ESPINOZA  G45.9  Transient



   4:02p  Clinic  LOGAN Valencia    cerebral



           ischemic attack,



           unspecified









 D68.61  Antiphospholipid syndrome

 

 I95.1  Orthostatic hypotension









 Office Visit  2018  Neurohospitalist  Mamie Merritt  G45.9  Transient



   2:37p  Clinic  MD    cerebral ischemic



           attack,



           unspecified









 D68.61  Antiphospholipid syndrome

 

 I10  Essential (primary) hypertension

 

 E78.5  Hyperlipidemia, unspecified









 Office Visit  2018  [facility]  Reina Roxannemartínlack  G45.9  Transient



   8:44a  Assoc,johana Washington NP    cerebral



     Hospitalists      ischemic attack,



           unspecified









 D68.61  Antiphospholipid syndrome

 

 I10  Essential (primary) hypertension

 

 E03.9  Hypothyroidism, unspecified









 Office Visit  2018  [facility]  Dev  G45.9  Transient



   8:39a  Assoc,johana Mayorga N.PTodd    cerebral ischemic



     Hospitalists      attack,



           unspecified









 D68.61  Antiphospholipid syndrome

 

 I10  Essential (primary) hypertension

 

 E03.9  Hypothyroidism, unspecified









 Office Visit  2018  Neurohospitalist  Kojo  G45.9  Transient



   2:31p  Clinic  MD Ashley    cerebral



           ischemic attack,



           unspecified









 D68.61  Antiphospholipid syndrome

 

 I10  Essential (primary) hypertension









 Office Visit  03/15/2018  3:00p  [facility]  Shravan SUTTON  I70.203  Unsp athscl native



     Cardiology Of  MD Ismael,    arteries of



     Cma AT [facility]    extremities,



           bilateral legs

 

 Office Visit  2018  3:00p  [facility]  Jaquelin Prado,  R06.02  Shortness of



     Cardiology Of  M.D.    breath



     [facility]      









 I35.0  Nonrheumatic aortic (valve) stenosis

 

 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

 

 M79.606  Pain in leg, unspecified

 

 I73.9  Peripheral vascular disease, unspecified

 

 R60.0  Localized edema

 

 E03.9  Hypothyroidism, unspecified









 Office Visit  2017  4:00p  [facility]  Mick VELIZ  R06.02  
Shortness of



     Of Cma  Graves, DO    breath



       [facility]    









 I35.0  Nonrheumatic aortic (valve) stenosis

 

 Z79.01  Long term (current) use of anticoagulants

 

 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

 

 I10  Essential (primary) hypertension

 

 E78.2  Mixed hyperlipidemia









 Office Visit  2017  [facility]  Jaquelinnathen Prado,  I25.810  Atherosclerosis of



   2:45p  Cardiology Of  M.DTodd    CABG w/o angina



     Cma      pectoris









 I35.0  Nonrheumatic aortic (valve) stenosis

 

 R60.0  Localized edema

 

 E78.2  Mixed hyperlipidemia

 

 I10  Essential (primary) hypertension









 Office Visit  2017  [facility]  Mick VELIZ  Z01.810  Encounter for



   2:20p  Cardiology Of  DO Star    preprocedural



     [facility]    cardiovascular



           examination









 I35.0  Nonrheumatic aortic (valve) stenosis

 

 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

 

 M16.12  Unilateral primary osteoarthritis, left hip









 Office Visit  2017  [facility]  Jaquelin Prado,  Z01.810  Encounter for



   2:30p  Cardiology Of  M.D.    preprocedural



     [facility]      cardiovascular



           examination









 I35.0  Nonrheumatic aortic (valve) stenosis

 

 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

 

 I65.23  Occlusion and stenosis of bilateral carotid arteries

 

 E78.2  Mixed hyperlipidemia









 Office Visit  2017  Surgical  Mick Murray,  K40.30  Unil inguinal



   11:15a  Associates Of  MD, FACS    hernia, w obst,



     Cma      w/o gangr, not



           spcf as recur

 

 Office Visit  2017  Orthopedic  Cris  S61.316D  Laceration w/o fb



   3:30p  Services Of  LOGAN Dan    of r little



     C.M.A.      finger w damage



           to nail, subs

 

 Office Visit  2017  Orthopedic  Cris  S61.316A  Laceration w/o fb



   2:15p  Services Of  LOGAN Dan    of r little



     C.M.A.      finger w damage



           to nail, init

 

 Office Visit  2017  Orthopedic  Cris  S61.316A  Laceration w/o fb



   1:30p  Services Of  LOGAN Dan    of r little



     C.M.A.      finger w damage



           to nail, init

 

 Office Visit  2017  [facility]  Jaquelin Prado,  I35.0  Nonrheumatic



   11:20a  Cardiology Of  M.TAN    aortic (valve)



     [facility]      stenosis









 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

 

 R60.0  Localized edema









 Office Visit  2016  7:09a  [facility]  Alan Dillon,  R06.00  Dyspnea,



     Assoc,pc  [facility]    unspecified



     Hospitalists      









 R42  Dizziness and giddiness

 

 I25.10  Athscl heart disease of native coronary artery w/o ang pctrs

 

 I35.0  Nonrheumatic aortic (valve) stenosis









 Office  2016  [facility]  Jeff  R06.00  Dyspnea,



 Visit  7:09a  Assoc,pc  CLAUDE Ramos    unspecified



     Hospitalists      









 R42  Dizziness and giddiness

 

 I25.10  Athscl heart disease of native coronary artery w/o ang pctrs

 

 I35.0  Nonrheumatic aortic (valve) stenosis









 Office Visit  2016  Orthopedic  Jenny Snyder,  M19.011  Primary



   10:30a  Services Of  MD    osteoarthritis,



     C.M.A.      right shoulder









 M75.121  Complete rotatr-cuff tear/ruptr of r shoulder, not trauma









 Office Visit  2016  9:45a  [facility]  Jaquelin Prado,  I35.0  
Nonrheumatic



     Of Dina  [facility]    aortic (valve)



           stenosis









 I65.23  Occlusion and stenosis of bilateral carotid arteries

 

 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

 

 E78.5  Hyperlipidemia, unspecified









 Office Visit  2016 10:00a  P[facility] And  [facility]  J98.4  Other 
disorders



     Sleep Services Of  MD Camila    of lung



     [facility]      









 R06.83  Snoring









 Office Visit  2016  [facility]  Dev  J18.9  Pneumonia,



   9:53a  Assoc,pc  Mayorga, N.P.    unspecified



     Hospitalists      organism









 E03.9  Hypothyroidism, unspecified

 

 I25.10  Athscl heart disease of native coronary artery w/o ang pctrs

 

 M54.9  Dorsalgia, unspecified









 Office Visit  01/15/2016  9:49a  [facility]  RUBY Salinas18.9  Pneumonia,



     Assoc,pc  N.P.    unspecified



     Hospitalists      organism









 E03.9  Hypothyroidism, unspecified

 

 I25.10  Athscl heart disease of native coronary artery w/o ang pctrs

 

 M54.9  Dorsalgia, unspecified









 Office Visit  2016  9:44a  [facility]  RUBY Salinas18.9  Pneumonia,



     Assoc,pc  N.P.    unspecified



     Hospitalists      organism









 E03.9  Hypothyroidism, unspecified

 

 I25.10  Athscl heart disease of native coronary artery w/o ang pctrs

 

 M54.9  Dorsalgia, unspecified









 Office Visit  2016  2:26p  [facility]  [facility]  J18.9  Pneumonia,



     Sleep Services Of  MD Camila    unspecified



     Cma      organism

 

 Office Visit  2016  9:37a  [facility]  Saranya West,  J18.9  Pneumonia,



     Assoc,pc  N.P.    unspecified



     Hospitalists      organism









 E03.9  Hypothyroidism, unspecified

 

 I25.10  Athscl heart disease of native coronary artery w/o ang pctrs

 

 M54.9  Dorsalgia, unspecified









 Office Visit  2015  [facility]  Jaquelin Prado,  I25.810  Atherosclerosis of



   9:30a  Cardiology Of  M.D.    CABG w/o angina



     Cma      pectoris









 I65.23  Occlusion and stenosis of bilateral carotid arteries

 

 I35.0  Nonrheumatic aortic (valve) stenosis

 

 I10  Essential (primary) hypertension

 

 E78.2  Mixed hyperlipidemia

 

 Z91.81  History of falling









 Office Visit  2015  [facility]  [facility]  786.09  Dyspnea &



   11:30a  Sleep Services Of  MD Camila    Respiratory



     Cma      Abnormalities Other

 

 Office Visit  2015  [facility]  Jaquelin Prado,  424.1  Aortic Valve



   11:15a  Of Dina FORD    Disorder









 414.02  Coronary Atherosclerosis Autologous Vein Bypass Graft

 

 427.89  Cardiac Dysrhythmia Other

 

 786.09  Dyspnea & Respiratory Abnormalities Other









 Office Visit  2015  Pul[facility] And  [facility]  786.09  Dyspnea &



   8:45a  Sleep Services Of  MD Camila    Respiratory



     Cma      Abnormalities Other









 424.1  Aortic Valve Disorder

 

 414.02  Coronary Atherosclerosis Autologous Vein Bypass Graft









 Office Visit  2014  Orthopedic  Linda Drake,  715.34  Osteoarthrosis



   11:00a  Services Of  MICHAEL-PEDRO Peterson Not Spec



     C.M.A.      Prime Or 2Ndy Hand









 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









 Office Visit  2014  Orthopedic  Tera Cast,  716.91  Arthropathy Unspec



   8:30a  Services Of  M.D.    Shoulder Region



     C.M.A.      

 

 Office Visit  2014  [facility]  Jaquelin  414.02  Coronary



   9:00a  Cardiology Of  John Prado Cma, M.D.    Autologous Vein



           Bypass Graft









 401.9  Hypertension Unspec

 

 272.2  Hyperlipidemia Mixed

 

 424.1  Aortic Valve Disorder









 Office Visit  2014  1:15p  Orthopedic  Tera Cast,  716.91  Arthropathy 
Unspec



     Services Of  M.D.    Shoulder Region



     DEBIMANIVAL      









 840.6  Sprains & Strains Supraspinatus (Muscle)(Tendon)









 Office Visit  2013  [facility]  Jaquelin Prado,  414.02  Coronary



   9:45a  [facility]    [facility]



     Cma      Autologous Vein



           Bypass Graft









 401.9  Hypertension Unspec

 

 424.1  Aortic Valve Disorder

 

 272.2  Hyperlipidemia Mixed









 Office Visit  2013  [facility]  Jaquelin Prado,  414.02  Coronary



   8:00a  [facility]    Athe[facility]



     Cma      Autologous Vein



           Bypass Graft









 443.9  Peripheral Vascular Disease Unspec

 

 401.9  Hypertension Unspec

 

 V72.81  Examination Preoperative Cardiovascular









 Office Visit  2013  9:15a  Orthopedic  Tera Cast,  354.0  Carpal Tunnel



     Services Of ELIONR FORD    Syndrome







Plan of Treatment

2019 - Aixa Yuan, N.P.I25.10 Atherosclerotic heart disease of native 
coronary artery withComments:Cath showed grafts open.Z95.1 Presence of 
aortocoronary bypass zqihbG99.0 Nonrheumatic aortic (valve) tnhwmtmxB94.2 Mixed 
hyperlipidemiaFollow up:f/u after TAVR. 2wk and 1month f/uRecommendations:Your 
CK was slightly elevated please have this rechecked in 1 month.I10 Essential (
primary) qfdyeqmspkqzM23.39 Abnormal result of other cardiovascular function 
eqihnZ42.818 Presence of other cardiac implants and grafts

## 2019-06-29 NOTE — ED
Dizziness





- HPI Summary


HPI Summary: 





This pt is an 85 y/o male presenting to [facility] via EMS for lightheadedness since 

6/28/19. Pt reports he was recently discharged on 6/28/19 from [facility] after having a procedure done for aortic stenosis.  Pt states 

he was bradycardic around 33 bpm today. He notes he had right hand numbness 

about 2 hours ago (around 22:00 on 6/28) that lasted a few minutes. Denies 

syncope. Denies any chest pain. 


Pt lives at home with his wife who has Alzheimer's. He has a care taker who 

comes to visit. 





- History Of Current Complaint


Chief Complaint: EDDizziness


Stated Complaint: LIGHT HEADED PER EMS


Hx Obtained From: Patient


Onset/Duration: Still Present


Severity Currently: Moderate


Character: Lightheaded


Aggravating Factor(s): Nothing


Alleviating Factor(s): Nothing


Associated Signs And Symptoms: Negative: Chest Pain





- Allergies/Home Medications


Allergies/Adverse Reactions: 


 Allergies











Allergy/AdvReac Type Severity Reaction Status Date / Time


 


ezetimibe [From Zetia] Allergy  Muscle Ache Verified 06/29/19 00:18


 


Beta-Blockers AdvReac  See Comment Verified 06/29/19 00:18





(Beta-Adrenergic Bloc     


 


Iodinated Contrast- Oral and AdvReac  Headache Verified 06/29/19 00:18





IV Dye     


 


niacin AdvReac  Flushing Verified 06/29/19 00:18


 


Statins-Hmg-Coa Reductase AdvReac  Muscle Ache Verified 06/29/19 00:18





Inhibitor     














PMH/Surg Hx/FS Hx/Imm Hx


Endocrine/Hematology History: Reports: Hx Anticoagulant Therapy, Hx Thyroid 

Disease - hypothyroidism, Other Endocrine/Hematological Disorders - 

antiphospholipid antibody syndrome 


   Denies: Hx Diabetes


Cardiovascular History: Reports: Hx Angina, Hx Cardiac Arrest, Hx Coronary 

Artery Disease, Hx Hypertension, Hx Myocardial Infarction, Hx Peripheral 

Vascular Disease, Hx Valvular Heart Disease, Other Cardiovascular Problems/

Disorders - CAD, moderate aortic stenosis, anti-phospholipid anitbody syndrome


   Denies: Hx Hypercholesterolemia, Hx Pacemaker/ICD


Respiratory History: Reports: Hx Asthma - as teen, none now, Hx Seasonal 

Allergies


   Denies: Hx Chronic Obstructive Pulmonary Disease (COPD)


GI History: Reports: Hx Gastroesophageal Reflux Disease, Other GI Disorders - 

hx ischemic colitis, no surgery


 History: 


   Denies: Hx Dialysis


Musculoskeletal History: Reports: Hx Arthritis, Hx Back Problems, Hx Orthopedic 

Injury, Hx Osteoporosis, Other Musculoskeletal History - avascular necrosis of 

left femoral head, spinal stenosis


Sensory History: Reports: Hx Cataracts, Hx Contacts or Glasses, Hx Vision 

Problem


   Denies: Hx Eye Injury, Hx Eye Prosthesis, Hx Glaucoma, Hx Macular 

Degeneration, Hx Deafness, Hx Hearing Aid, Hx Hearing Problem


Opthamlomology History: Reports: Hx Cataracts, Hx Contacts or Glasses, Hx 

Vision Problem


   Denies: Hx Eye Injury, Hx Eye Prosthesis, Hx Glaucoma, Hx Macular 

Degeneration


Neurological History: Reports: Hx Transient Ischemic Attacks (TIA), Other Neuro 

Impairments/Disorders - occas vertigo


   Denies: Hx CVA, Hx Dementia, Hx Seizures


Psychiatric History: 


   Denies: Hx Panic Disorder





- Cancer History


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 

in 1989





- Surgical History


Surgery Procedure, Year, and Place: bilateral rotator cuff repairs, right total 

hip replacement 2012,.  cardiac stents x2, 1999, 2-vessel CABG.  bilateral 

carpal tunnel releases and trigger finger release.  open appendectomy, cataract 

surgery.  umbilical hernia repair


Hx Anesthesia Reactions: No





- Immunization History


Date of Tetanus Vaccine: unk


Date of Influenza Vaccine: unk


Infectious Disease History: No


Infectious Disease History: 


   Denies: Hx Clostridium Difficile, Hx Hepatitis, Hx Human Immunodeficiency 

Virus (HIV), Hx of Known/Suspected MRSA, Hx Shingles, Hx Tuberculosis, History 

Other Infectious Disease, Traveled Outside the [place] in Last 30 Days





- Family History


Known Family History: Positive: Other - colon cancer, and perforated bowel.


   Negative: Hypertension, Diabetes





- Social History


Alcohol Use: Daily


Alcohol Amount: 1/2 glass wine nightly


Substance Use Type: Reports: None


Smoking Status (MU): Never Smoked Tobacco





Review of Systems


Negative: Fever


Cardiovascular: Other - POSITIVE: bradycardia


Negative: Chest Pain


Neurological: Other - POSITIVE: lightheadedness


Positive: Numbness - in right hand.  Negative: Syncope


All Other Systems Reviewed And Are Negative: Yes





Physical Exam





- Summary


Physical Exam Summary: 





VITAL SIGNS: Reviewed.


GENERAL: Patient is a well-developed and nourished male who is lying 

comfortable in the stretcher. Patient is not in any acute respiratory distress.


HEAD AND FACE: No signs of trauma. No ecchymosis, hematomas or skull 

depressions. No sinus tenderness.


EYES: PERRLA, EOMI x 2, No injected conjunctiva, no nystagmus.


EARS: Hearing grossly intact. Ear canals and tympanic membranes are within 

normal limits.


MOUTH: Oropharynx within normal limits.


NECK: Supple, trachea is midline, no adenopathy, no JVD, no carotid bruit, no c-

spine tenderness, neck with full ROM.


CHEST: Symmetric, no tenderness at palpation


LUNGS: Clear to auscultation bilaterally. No wheezing or crackles.


CVS: Irregular heart beat, S1 and S2 present, no murmurs or gallops appreciated.


ABDOMEN: Soft, non-tender. No signs of distention. No rebound no guarding, and 

no masses palpated. Bowel sounds are normal.


EXTREMITIES: FROM in all major joints, no edema, no cyanosis or clubbing.


NEURO: Alert and oriented x 3. No acute neurological deficits. Speech is normal 

and follows commands.


SKIN: Dry and warm


Triage Information Reviewed: Yes


Vital Signs On Initial Exam: 


 Initial Vitals











Temp Pulse Resp BP Pulse Ox


 


 98.0 F   42   14   152/86   95 


 


 06/29/19 00:16  06/29/19 00:16  06/29/19 00:16  06/29/19 00:16  06/29/19 00:16











Vital Signs Reviewed: Yes





Diagnostics





- Vital Signs


 Vital Signs











  Temp Pulse Resp BP Pulse Ox


 


 06/29/19 00:16  98.0 F  42  14  152/86  95














- Laboratory


Result Diagrams: 


 06/29/19 01:11





 06/29/19 01:11


Lab Statement: Any lab studies that have been ordered have been reviewed, and 

results considered in the medical decision making process.





- EKG


  ** 00:28


Cardiac Rate: NL - at 69 bpm


EKG Rhythm: Sinus Rhythm


Ectopy: PVCs


Summary of EKG Findings: Left bundle branch block.





Dizzy Course/Dx





- Course


Assessment/Plan: Pt is an 85 y/o male presenting to [facility] via EMS for 

lightheadedness since 6/28/19, bradycardia, and an episode of right hand 

numbness.  Pt had cardiac cath by Dr. Patterson on 6/03/19.  Pt had a procedure 

done at [facility] for aortic stenosis and discharged on 6/28.  

Obtained records from [facility] and pt's EKG on June 28 showed 

LBBB.  Pt had mild elevation of troponin here in the ED most likely secondary 

to recent procedure he had in [place], TAVR.  Pt did not have any chest pain. 

Pt remained asymptomatic in the ED.  He will be discharged home with follow up 

from his PCP and cardiologist.  Pt was given instructions to return to the ED 

for any worsening or new symptoms.





- Diagnoses


Provider Diagnoses: 


 Dizziness








- Provider Notifications


Discussed Care Of Patient With: Shravan Guevara


Time Discussed With Above Provider: 00:48


Instructed by Provider To: Other - Discussed the pt's case with Dr. Guevara, 

interventionalist.





Discharge





- Sign-Out/Discharge


Documenting (check all that apply): Patient Departure - Discharge home


Patient Received Moderate/Deep Sedation with Procedure: No





- Discharge Plan


Condition: Stable


Disposition: HOME


Patient Education Materials:  Dizziness (ED)


Referrals: 


Tab Sadler MD [Primary Care Provider] - 


Additional Instructions: 


Please follow up with your primary care provider in 1-2 days. 





Also follow up with your cardiologist.





RETURN TO EMERGENCY DEPARTMENT FOR ANY NEW OR WORSENING SYMPTOMS.





- Attestation Statements


Document Initiated by Scribe: Yes


Documenting Scribe: Brigette Medina


Provider For Whom Scribe is Documenting (Include Credential): Shasta Floyd MD


Scribe Attestation: 


IBrigette, scribed for Shasta Floyd MD on 06/29/19 at 0548. 


Status of Scribe Document: Ready

## 2019-06-29 NOTE — ED
Neurological HPI





- HPI Summary


HPI Summary: 


This patient is a 86 year old M presenting to ED with a chief complaint of 

transient right hand numbness occurring last night. At the time, patient also 

had dizziness, headache, and vertigo. Patient was seen here and discharged with 

dx of dizziness. However, this morning, the patient felt his symptoms 

worsening. He felt the room spinning, weakness, and was walking off-balance. 

Therefore, the patient came to the ER accompanied by his daughter. Per daughter

, patient was confused and had short-term memory loss for 10-15 minutes and did 

not know why they were coming to the ER. In the ER waiting room, patient again 

experienced right hand numbness lasting 3-5 minutes. Patient had an aortic 

valve replacement trans catheter surgery performed on 6/26/19 at [place]. 

Patient reports being able to lift his right arm but felt the numbness in the 

hand. The patient rates the pain 8/10 in severity. Symptoms aggravated by 

nothing. Symptoms alleviated by nothing. Patient denies fever. PMHx of TIA.





- History of Current Complaint


Chief Complaint: EDNeurologicalDeficit


Stated Complaint: POSS STROKE PER PT


Time Seen by Provider: 06/29/19 11:26


Hx Obtained From: Patient, Family/Caretaker - Daughter


Onset/Duration: Sudden Onset, Resolved


Timing: Intermittent Episodes Lasting: - 3-5 minutes


Onset Severity: Mild


Current Severity: Severe


Neurological Deficit Location: RUE - Right-hand numbness


Pain Intensity: 8


Pain Scale Used: 0-10 Numeric


Character: Room Spinning, Weak, Dizzy, Numbness/Tingling - Right hand, Confusion


Aggravating: Nothing


Alleviating: Nothing


Associated Signs and Symptoms: Positive: Unsteady Gait, Headache, Memory Loss, 

Confusion, Weakness, Dizziness, Numbness.  Negative: Fever





- Additional Pertinent History


Primary Care Physician: [id number]





- Allergy/Home Medications


Allergies/Adverse Reactions: 


 Allergies











Allergy/AdvReac Type Severity Reaction Status Date / Time


 


ezetimibe [From Zetia] Allergy  Muscle Ache Verified 06/29/19 00:18


 


Beta-Blockers AdvReac  See Comment Verified 06/29/19 00:18





(Beta-Adrenergic Bloc     


 


Iodinated Contrast- Oral and AdvReac  Headache Verified 06/29/19 00:18





IV Dye     


 


niacin AdvReac  Flushing Verified 06/29/19 00:18


 


Statins-Hmg-Coa Reductase AdvReac  Muscle Ache Verified 06/29/19 00:18





Inhibitor     











Home Medications: 


 Home Medications





Furosemide 1 tab PO DAILY 06/29/19 [History Confirmed 06/29/19]


Melatonin [Melatonin Maximum Strengt] 10 mg PO BEDTIME 06/29/19 [History 

Confirmed 06/29/19]


Tricor 145 mg PO DAILY 06/29/19 [History Confirmed 06/29/19]


Vitamin C 2,000 mg PO BID 06/29/19 [History Confirmed 06/29/19]


Vitamin D3 5,000 unit PO DAILY 06/29/19 [History Confirmed 06/29/19]


Warfarin TAB(*) [Coumadin TAB(*)] 5 mg PO DAILY 06/29/19 [History Confirmed 06/ 29/19]











PMH/Surg Hx/FS Hx/Imm Hx


Previously Healthy: No


Endocrine/Hematology History: Reports: Hx Anticoagulant Therapy, Hx Thyroid 

Disease - hypothyroidism, Other Endocrine/Hematological Disorders - 

antiphospholipid antibody syndrome 


   Denies: Hx Diabetes


Cardiovascular History: Reports: Hx Angina, Hx Cardiac Arrest, Hx Coronary 

Artery Disease, Hx Hypertension, Hx Myocardial Infarction, Hx Peripheral 

Vascular Disease, Hx Valvular Heart Disease, Other Cardiovascular Problems/

Disorders - CAD, moderate aortic stenosis, anti-phospholipid anitbody syndrome


   Denies: Hx Hypercholesterolemia, Hx Pacemaker/ICD


Respiratory History: Reports: Hx Asthma - as teen, none now, Hx Seasonal 

Allergies


   Denies: Hx Chronic Obstructive Pulmonary Disease (COPD)


GI History: Reports: Hx Gastroesophageal Reflux Disease, Other GI Disorders - 

hx ischemic colitis, no surgery


 History: 


   Denies: Hx Dialysis


Musculoskeletal History: Reports: Hx Arthritis, Hx Back Problems, Hx Orthopedic 

Injury, Hx Osteoporosis, Other Musculoskeletal History - avascular necrosis of 

left femoral head, spinal stenosis


Sensory History: Reports: Hx Cataracts, Hx Contacts or Glasses, Hx Vision 

Problem


   Denies: Hx Eye Injury, Hx Eye Prosthesis, Hx Glaucoma, Hx Macular 

Degeneration, Hx Deafness, Hx Hearing Aid, Hx Hearing Problem


Opthamlomology History: Reports: Hx Cataracts, Hx Contacts or Glasses, Hx 

Vision Problem


   Denies: Hx Eye Injury, Hx Eye Prosthesis, Hx Glaucoma, Hx Macular 

Degeneration


Neurological History: Reports: Hx Transient Ischemic Attacks (TIA), Other Neuro 

Impairments/Disorders - occas vertigo


   Denies: Hx CVA, Hx Dementia, Hx Seizures


Psychiatric History: 


   Denies: Hx Panic Disorder





- Cancer History


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 

in 1989





- Surgical History


Surgery Procedure, Year, and Place: bilateral rotator cuff repairs, right total 

hip replacement 2012,.  cardiac stents x2, 1999, 2-vessel CABG.  bilateral 

carpal tunnel releases and trigger finger release.  open appendectomy, cataract 

surgery.  umbilical hernia repair


Hx Anesthesia Reactions: No





- Immunization History


Date of Tetanus Vaccine: unk


Date of Influenza Vaccine: unk


Infectious Disease History: No


Infectious Disease History: 


   Denies: Hx Clostridium Difficile, Hx Hepatitis, Hx Human Immunodeficiency 

Virus (HIV), Hx of Known/Suspected MRSA, Hx Shingles, Hx Tuberculosis, History 

Other Infectious Disease, Traveled Outside the [place] in Last 30 Days





- Family History


Known Family History: Positive: Other - colon cancer, and perforated bowel.


   Negative: Hypertension, Diabetes





- Social History


Alcohol Use: Daily


Alcohol Amount: 1/2 glass wine nightly


Hx Substance Use: No


Substance Use Type: Reports: None


Hx Tobacco Use: No


Smoking Status (MU): Never Smoked Tobacco





Review of Systems


Negative: Fever


Neurological: Other - Confusion, diziness, vertigo


Positive: Headache, Weakness, Numbness - Right-hand


All Other Systems Reviewed And Are Negative: Yes





Physical Exam





- Summary


Physical Exam Summary: 


Appearance: Well appearing, no pain distress


Skin: warm, dry, reflects adequate perfusion


Head/face: normal


Eyes: EOMI, JAYSON


ENT: normal


Neck: supple, non-tender


Respiratory: CTA, breath sounds present


Cardiovascular: RRR, pulses symmetrical


Abdomen: non-tender, soft


Musculoskeletal: normal, strength/ROM intact


Neuro: normal, sensory motor intact, A&Ox3


NIH: 0


GCS: 15


Triage Information Reviewed: Yes


Vital Signs On Initial Exam: 


 Initial Vitals











Temp Pulse Resp BP Pulse Ox


 


 98.1 F   64   18   140/69   96 


 


 06/29/19 11:18  06/29/19 11:18  06/29/19 11:18  06/29/19 11:18  06/29/19 11:18











Vital Signs Reviewed: Yes





Diagnostics





- Vital Signs


 Vital Signs











  Temp Pulse Resp BP Pulse Ox


 


 06/29/19 11:18  98.1 F  64  18  140/69  96














- Laboratory


Result Diagrams: 


 06/29/19 12:13





 06/29/19 12:13


Lab Statement: Any lab studies that have been ordered have been reviewed, and 

results considered in the medical decision making process.





- Radiology


  ** CXR


Radiology Interpretation Completed By: Radiologist


Summary of Radiographic Findings: CARDIOMEGALY. BIBASILAR ATELECTASIS WITH 

RIGHT INFRAHILAR DENSITY WHICH IS NODULE IS NOT. Dr. Black has reviewed this 

radiology report.





- CT


  ** Brain


CT Interpretation Completed By: Radiologist


Summary of CT Findings: Atrophy. No definite intracranial mass or hemorrhage is 

noted. Dr. Black has reviewed this radiology report.





- EKG


  ** 1150


Cardiac Rate: NL - 69 BPM


EKG Rhythm: Sinus Rhythm


Summary of EKG Findings: NSR at 69 BPM, LBBB





NIH Scale





- NIH Scale


Level of Consciousness: Alert/Keenly Responsive


Ask Patient the Month and His/Her Age: Both Correct


Ask Pt to Open/Close Eyes and /Release Non-Paretic Hand: Both Correctly


Best Gaze (Only Horizontal Eye Movement): Normal


Visual Field Testing: No Visual Loss


Facial Paresis-Pt to Smile & Close Eyes or Grimace Symmetry: Normal/Symmetrical


Motor Function - Right Arm: No Drift-Holds 10 Seconds


Motor Function - Left Arm: No Drift-Holds 10 Seconds


Motor Function - Right Leg: No Drift-Holds 10 Seconds


Motor Function - Left Leg: No Drift-Holds 10 Seconds


Limb Ataxia-Must be out of Proportion to Weakness Present: Absent


Sensory (Use Pinprick to Test Arms/Legs/Trunk/Face): Normal


Best Language (Describe Picture, Name Items): No Aphasia


Dysarthria (Read Several Words): Normal


Extinction and Inattention: No Abnormality


Total Score: 0





Re-Evaluation





- Re-Evaluation


  ** First Eval


Re-Evaluation Time: 13:30


Comment: Discussed results with patient. Patient will be admitted to [facility]. 

Patient understands and agrees with this plan.





Course/Dx





- Course


Course Of Treatment: This patient is a 86 year old M presenting to ED with a 

chief complaint of transient right hand numbness, dizziness, vertigo, headache, 

and confusion occurring last night. In the ED course, patient received fluids 

and Solu Medrol. Blood work obtained. EKG revealed NSR at 69 BPM, LBBB. Brain 

CT revealed atrophy. No definite intracranial mass or hemorrhage is noted. CXR 

revealed CARDIOMEGALY. BIBASILAR ATELECTASIS WITH RIGHT INFRAHILAR DENSITY 

WHICH IS NODULE IS NOT. Discussed patient case with Dr. Turner neurologist and 

Dr. Bowen hospitalist who accepted the patient for admission to [facility]. Therefore, 

patient will be admitted with dx of TIA. Patient agrees with and understands 

this plan.





- Differential Dx


Differential Diagnoses Neuro: Positive: Cerebrovascular Accident, Transient 

Ischemic Attack





- Diagnoses


Provider Diagnoses: 


 TIA (transient ischemic attack)








- Physician Notifications


Discussed Care Of Patient With: Branden Turner


Time Discussed With Above Provider: 13:43


Instructed by Provider To: Other - Discussed patient case with Dr. Turner, 

neurologist, who recommended the patient be admitted and a head/neck CTA taken. 

At 1352 discussed patient case with Dr. Bowen, hospitalist, who accepted the 

patient for admission to [facility].





- Critical Care Time


Critical Care Time: 30-74 min - 30 minutes





Discharge





- Sign-Out/Discharge


Documenting (check all that apply): Patient Departure - Admit


Patient Received Moderate/Deep Sedation with Procedure: No





- Discharge Plan


Condition: Good


Disposition: ADMITTED TO [facility]


Referrals: 


Tab Sadler MD [Primary Care Provider] - 





- Billing Disposition and Condition


Condition: GOOD


Disposition: Admitted to [facility]





- Attestation Statements


Document Initiated by Lme: Yes


Documenting Scribe: Mook Bill


Provider For Whom Jessika is Documenting (Include Credential): Gage Black MD


Scribe Attestation: 


IMook, scribed for Gage Black MD on 06/29/19 at 1408. 


Scribe Documentation Reviewed: Yes


Provider Attestation: 


The documentation as recorded by the Mook jacobs accurately reflects 

the service I personally performed and the decisions made by me, Gage Black MD


Status of Scribe Document: Viewed

## 2019-06-30 LAB
ANION GAP SERPL CALC-SCNC: 6 MMOL/L (ref 2–11)
BUN SERPL-MCNC: 22 MG/DL (ref 6–24)
BUN/CREAT SERPL: 29.3 (ref 8–20)
CALCIUM SERPL-MCNC: 9.1 MG/DL (ref 8.6–10.3)
CHLORIDE SERPL-SCNC: 105 MMOL/L (ref 101–111)
GLUCOSE SERPL-MCNC: 130 MG/DL (ref 70–100)
HCO3 SERPL-SCNC: 27 MMOL/L (ref 22–32)
INR PPP/BLD: 1.3 (ref 0.82–1.09)
MAGNESIUM SERPL-MCNC: 1.8 MG/DL (ref 1.9–2.7)
POTASSIUM SERPL-SCNC: 4.4 MMOL/L (ref 3.5–5)
SODIUM SERPL-SCNC: 138 MMOL/L (ref 135–145)

## 2019-06-30 RX ADMIN — ENOXAPARIN SODIUM SCH MG: 100 INJECTION SUBCUTANEOUS at 03:41

## 2019-06-30 RX ADMIN — Medication SCH UNITS: at 09:47

## 2019-06-30 RX ADMIN — MECLIZINE HYDROCHLORIDE PRN MG: 12.5 TABLET ORAL at 09:52

## 2019-06-30 RX ADMIN — MECLIZINE HYDROCHLORIDE SCH: 12.5 TABLET ORAL at 18:58

## 2019-06-30 RX ADMIN — CYANOCOBALAMIN TAB 500 MCG SCH MCG: 500 TAB at 09:47

## 2019-06-30 RX ADMIN — ENOXAPARIN SODIUM SCH MG: 100 INJECTION SUBCUTANEOUS at 18:17

## 2019-06-30 RX ADMIN — POLYETHYLENE GLYCOL 3350 SCH: 17 POWDER, FOR SOLUTION ORAL at 10:34

## 2019-06-30 RX ADMIN — WARFARIN SODIUM SCH MG: 10 TABLET ORAL at 18:18

## 2019-06-30 RX ADMIN — FERROUS SULFATE TAB 325 MG (65 MG ELEMENTAL FE) SCH MG: 325 (65 FE) TAB at 09:48

## 2019-06-30 RX ADMIN — Medication SCH: at 10:34

## 2019-06-30 RX ADMIN — FUROSEMIDE SCH MG: 20 TABLET ORAL at 09:48

## 2019-06-30 RX ADMIN — LEVOTHYROXINE SODIUM SCH MCG: 75 TABLET ORAL at 05:57

## 2019-06-30 RX ADMIN — MECLIZINE HYDROCHLORIDE SCH MG: 12.5 TABLET ORAL at 21:17

## 2019-06-30 RX ADMIN — PANTOPRAZOLE SODIUM SCH MG: 40 TABLET, DELAYED RELEASE ORAL at 09:47

## 2019-06-30 RX ADMIN — MECLIZINE HYDROCHLORIDE SCH MG: 12.5 TABLET ORAL at 18:18

## 2019-06-30 NOTE — CONS
NEUROLOGY CONSULTATION:

 

DATE OF CONSULT:  06/30/19

 

LOCATION:  He is an inpatient in room 448.

 

REFERRING PROVIDER:  Dr. Bowen.

 

CHIEF COMPLAINT:  Episode of right-sided numbness.

 

HISTORY OF PRESENT ILLNESS:  Wilmer Campbell is an 86-year-old ambidextrous 
man, who developed episodes of right hand numbness yesterday.  They lasted less 
than an hour and resolved.  He recently underwent a transaortic valve 
replacement just a few days ago.  He was off of his chronic anticoagulation for 
antiphospholipid antibody syndrome and his INR when he came into the emergency 
room was 1.2.  His symptoms have not recurred since yesterday.  He was 
evaluated by Dr. Black and then Dr. Bowen.  Dr. Bowen noted drooping of the 
right nasolabial fold, slight dysarthria, slight dissymmetry on finger-to-nose 
maneuver worse on the right side. He had a CT scan of the brain interpreted as 
showing atrophy.  I reviewed the images and it revealed some atrophy, cavum 
septum vergae, and diffuse hypodensity in the subcortical regions consistent 
with chronic ischemic changes.  There is no evidence of an acute infarction or 
hemorrhage.  He had a CT angiogram of the head in the emergency room as well 
interpreted as showing no significant stenosis in the cervical vasculature and 
unremarkable CT angiogram of the brain.

 

He had an episode about a year ago for which he was evaluated here in the 
hospital by Dr. Curtis for double vision, which resolved.  It was felt that he 
might have had a transient ischemic attack and he was off anticoagulation at 
that point as well.

 

PAST MEDICAL HISTORY:  Notable for the aortic valve procedure just last week, 
antiphospholipid antibody syndrome with chronic anticoagulation, coronary 
artery disease with bypass surgery in 2004, carotid artery stenosis, peripheral 
vascular disease, hyperlipidemia, hypertension, Meniere's disease.

 

MEDICATIONS:  At home consist of:

1.  Magnesium oxide 500 mg p.o. daily.

2.  CBD oil 20 mg p.o. daily.

3.  TriCor 145 mg p.o. daily.

4.  Meclizine 25 mg p.o. b.i.d. p.r.n. dizziness.

5.  Gabapentin 300 mg p.o. t.i.d.

6.  Vitamin B12 1000 mcg p.o. q.a.m.

7.  Colestipol 2 g p.o. b.i.d.

8.  Warfarin recently restarted 7.5 mg p.o. daily.

9.  Omeprazole 20 mg p.o. q.a.m.

10.  Lasix 20 mg p.o. daily.

11.  Synthroid 100 mcg p.o. daily.

12.  Temazepam 7.5 mg p.o. q.h.s.

 

ALLERGIES:  He is allergic to IV CONTRAST DYE, had muscle aches to STATINS, 
flushing reaction to NIACIN, bradycardia to BETA-BLOCKERS.

 

FAMILY HISTORY:  His wife has advanced Alzheimer disease and is a patient of 
mine. She is starting to refuse to eat.

 

SOCIAL HISTORY:  He is a nonsmoker, rarely drinks alcohol.

 

REVIEW OF SYSTEMS:  No recent chills or fevers.  His weight has been stable.  
No problems with swallowing.  No recent problems with vision.  He gets episodic 
headaches and that has been lifelong.  No recent shortness of breath or chest 
pain.

 

PHYSICAL EXAM:  He is well nourished and well hydrated.  Temperature 97.1, 
blood pressure 128/67, heart rate in the 50s and regular.  Respiratory rate is 
20 and oxygen saturation is 96% on room air.

 

Lungs are clear.  Heart is in a regular rhythm without murmurs.  There are no 
cervical bruits.

 

Neurological Exam:  Pupils, fundi, and eye movements are normal.  Visual fields 
are full to confrontation.  Facial musculature is intact and symmetric.  Facial 
sensation is intact and symmetric to light touch and pin discrimination.  
Palate and tongue appear normal and speech is clear.  He has mild dysarthria, 
which is chronic as I have known him a long time.

 

Motor exam reveals some limitation in motion about the shoulders.  He has 
normal strength in the upper extremities and lower extremities bilaterally.  
There is no spasticity.

 

Finger taps are symmetrical in the hands.  There is no rest or sustention 
tremor.

 

Foot taps are normal in the feet.

 

Sensation to pin and light touch is diminished in the right hand relative to 
the left.  It is diminished in the feet bilaterally with pin discrimination 
level at the about the mid tibias bilaterally.

 

Reflexes are symmetric, ankle reflexes are absent, plantar responses are flexor.

 

He is alert and oriented to person, place, and time.  Speech is as mentioned 
above and language is fluent.  He has good memory and adequate attention, 
concentration, and fund of knowledge.

 

DIAGNOSTIC STUDIES/LAB DATA:  Includes a CT angiogram and CT of the brain 
mentioned above.

 

Other laboratory data is notable for a chemistry profile with an elevated 
carbon dioxide when he came in at 34, down to 27 this morning.  BUN was a 
little bit elevated at 27 last evening, down to 22 today.  Glucose was 110 last 
evening, 130 this morning.  Magnesium is a little bit low at 1.8.  Troponin was 
slightly elevated at 0.07 last evening.  BNP is slightly elevated at 185, TSH 
is elevated at 15.8 and total T3 is low at 65.

 

INR was 1.22 yesterday evening, 1.30 this morning.

 

CBC is normal other than platelet count of 140,000.

 

IMPRESSION AND PLAN:  Impression is that of a possible transient ischemic 
attack. He has antiphospholipid antibody syndrome and just had an aortic valve 
procedure. He was subtherapeutic with anticoagulation and that has been 
corrected.  He is on Lovenox bridge while his INR is being brought up to 2.0 or 
higher.

 

He has an MRI of the brain pending.  He has a LINQ monitor, so he may not be 
able to get it.  I would keep him on telemetry for another day and if we can 
get his MRI tomorrow, make further recommendations.  Currently, he is being 
anticoagulated and his LDL is 48 and cholesterol 124.  His blood pressure is 
under good control.

 

I will follow him along with you.

 

 [id number]/CPS #: [id number]

DELMIS

## 2019-06-30 NOTE — PN
Subjective


Interval History: 








speech clearer, face more symmetric. 


dorsal right hand feels like there is a "thin layer of dirt" and "30% less 

sensitive" then left. 


no ECHO or MRI yet


had severe episode of dizziness, improved with meclizine. 


Trigemeny, PVCs


asking for increase in meclizine 














Objective


Active Medications: 








Acetaminophen (Tylenol Tab*)  650 mg PO Q4H PRN


   PRN Reason: FEVER/HEADACHE


Cholecalciferol (Vitamin D Tab*)  5,000 units PO DAILY [facility]


   Last Admin: 06/30/19 09:47 Dose:  5,000 units


Coenzyme Q10 (Coenzyme Q10 (Nf))  1 cap PO QAM [facility]


   Last Admin: 06/30/19 10:34 Dose:  Not Given


Cyanocobalamin (Vitamin B12 Tab*)  1,000 mcg PO QAM [facility]


   Last Admin: 06/30/19 09:47 Dose:  1,000 mcg


Enoxaparin Sodium (Lovenox(*))  80 mg SUBCUT Q12H [facility]


   Last Admin: 06/30/19 18:17 Dose:  80 mg


Fenofibrate (Tricor 160 Mg)  160 mg PO DAILY [facility]


   Last Admin: 06/30/19 10:51 Dose:  160 mg


Ferrous Sulfate (Ferrous Sulfate Tab*)  325 mg PO DAILY [facility]


   Last Admin: 06/30/19 09:48 Dose:  325 mg


Furosemide (Lasix Tab*)  20 mg PO DAILY [facility]


   Last Admin: 06/30/19 09:48 Dose:  20 mg


Levothyroxine Sodium (Synthroid Tab*)  112.5 mcg PO 0600 [facility]


   Last Admin: 06/30/19 05:57 Dose:  112.5 mcg


Meclizine HCl (Antivert Tab*)  25 mg PO QID [facility]


   Last Admin: 06/30/19 18:58 Dose:  Not Given


Melatonin (Melatonin)  9 mg PO BEDTIME [facility]


   Last Admin: 06/29/19 22:08 Dose:  9 mg


Metoclopramide HCl (Reglan Tab*)  10 mg PO Q8H PRN


   PRN Reason: Headache


Pto: Cbd Oil 20 Mg  20 mg PO QAM [facility]


   Last Admin: 06/30/19 10:34 Dose:  Not Given


Pto: Magnesium Oxide ([Magnesium] 500 Mg)  500 mg PO DAILY [facility]


   Last Admin: 06/30/19 10:34 Dose:  Not Given


Pantoprazole Sodium (Protonix Tab*)  40 mg PO QAM [facility]


   Last Admin: 06/30/19 09:47 Dose:  40 mg


Polyethylene Glycol/Electrolytes (Miralax*)  17 gm PO DAILY [facility]


   Last Admin: 06/30/19 10:34 Dose:  Not Given


Warfarin Sodium (Coumadin Tab(*))  10 mg PO DAILY@1700 [facility]; Protocol


   Last Admin: 06/30/19 18:18 Dose:  10 mg








 Vital Signs - 8 hr











  06/30/19





  15:33


 


Temperature 97.8 F


 


Pulse Rate 64


 


Respiratory 18





Rate 


 


Blood Pressure 125/67





(mmHg) 


 


O2 Sat by Pulse 100





Oximetry 











Oxygen Devices in Use Now: None


Appearance: NAD, sitting on side of bed.


Eyes: No Scleral Icterus


Ears/Nose/Mouth/Throat: NL Teeth, Lips, Gums


Neck: NL Appearance and Movements; NL JVP


Respiratory: Symmetrical Chest Expansion and Respiratory Effort, Clear to 

Auscultation


Cardiovascular: NL Sounds; No Murmurs; No JVD, RRR


Abdominal: NL Sounds; No Tenderness; No Distention


Extremities: No Edema


Skin: No Rash or Ulcers


Neurological: Alert and Oriented x 3, - - CN II-XII intact, FTN intact, too 

inflexible to do HTS. 


Nutrition: Taking PO's


Result Diagrams: 


 06/29/19 12:13





 06/30/19 06:19


Additional Lab and Data: 





 Laboratory Results - last 24 hr











  06/29/19 06/30/19 06/30/19





  12:13 06:19 06:19


 


INR (Anticoag Therapy)   1.30 H 


 


Sodium    138


 


Potassium    4.4


 


Chloride    105


 


Carbon Dioxide    27


 


Anion Gap    6


 


BUN    22


 


Creatinine    0.75


 


Est GFR ( Amer)    119.5


 


Est GFR (Non-Af Amer)    98.7


 


BUN/Creatinine Ratio    29.3 H


 


Glucose    130 H


 


Hemoglobin A1c  5.5  


 


Calcium    9.1


 


Magnesium    1.8 L


 


Urine Color   


 


Urine Appearance   


 


Urine pH   


 


Ur Specific Gravity   


 


Urine Protein   


 


Urine Ketones   


 


Urine Blood   


 


Urine Nitrate   


 


Urine Bilirubin   


 


Urine Urobilinogen   


 


Ur Leukocyte Esterase   


 


Urine Glucose   














  06/30/19





  14:25


 


INR (Anticoag Therapy) 


 


Sodium 


 


Potassium 


 


Chloride 


 


Carbon Dioxide 


 


Anion Gap 


 


BUN 


 


Creatinine 


 


Est GFR ( Amer) 


 


Est GFR (Non-Af Amer) 


 


BUN/Creatinine Ratio 


 


Glucose 


 


Hemoglobin A1c 


 


Calcium 


 


Magnesium 


 


Urine Color  Straw


 


Urine Appearance  Clear


 


Urine pH  7.0


 


Ur Specific Gravity  1.009 L


 


Urine Protein  Negative


 


Urine Ketones  Negative


 


Urine Blood  Negative


 


Urine Nitrate  Negative


 


Urine Bilirubin  Negative


 


Urine Urobilinogen  Negative


 


Ur Leukocyte Esterase  Negative


 


Urine Glucose  Negative














Assess/Plan/Problems-Billing


Assessment: 





85 yo male PMH antiphospholipid syndrome, severe AS s/p TAVR on 6/26 at 

[place], TIAs, Mienerre's Disease, CAD, PVD, HTN, HLD, carotid aterery 

stenosis p/w episodes of right hand numbness and slurred speech while 

subtherapeutic INR (periprocedure hold). Awaiting MRI, ECHO. 








- Patient Problems


(1) Focal neurological deficit


Current Visit: Yes   Status: Acute   Code(s): R29.818 - OTHER SYMPTOMS AND 

SIGNS INVOLVING THE NERVOUS SYSTEM   SNOMED Code(s): 560911121


   Comment: In setting of subtherapeutic INR (held for TAVR)


Appreciate neuro recs


continue lovenox to coumadin bridge 80mg q12


he is intolerant of statins, continue fenofibrate 160mg daily


telemetry


await MRI Brain and ECHO w/ Bubble(there was a question of intrapulmonary shunt 

on last ECHO of note given late bubbles)   





(2) Meniere disease


Current Visit: Yes   Status: Acute   Code(s): H81.09 - MENIERE'S DISEASE, 

UNSPECIFIED EAR   SNOMED Code(s): 40287816


   Comment: Increase meclizine to QID from BID (family brought in bottles with 

updated dose instructions compared to their original med list)   





(3) S/P TAVR (transcatheter aortic valve replacement)


Current Visit: Yes   Status: Acute   Code(s): Z95.2 - PRESENCE OF PROSTHETIC 

HEART VALVE   SNOMED Code(s): 8998067049669


   Comment: with Dr. Nilsa Champagne on 6/26/19   





(4) DVT prophylaxis


Current Visit: No   Status: Acute   Code(s): FKH6375 -    SNOMED Code(s): 

413916126


   Comment: - Lovenox bridge to warfarin (INR subtherapeutic).   





(5) HTN (hypertension)


Current Visit: No   Status: Acute   Code(s): I10 - ESSENTIAL (PRIMARY) 

HYPERTENSION   SNOMED Code(s): 14482958


   Comment: 


- -150's


- Continue lasix   





(6) Antiphospholipid syndrome


Current Visit: No   Status: Chronic   Code(s): D68.61 - ANTIPHOSPHOLIPID 

SYNDROME   SNOMED Code(s): 74338655


   Comment: 


- Goal INR 2-3


- Continue warfarin (10mg, home is 7.5mg) with lovenox bridge   





(7) CAD (coronary artery disease)


Current Visit: No   Status: Chronic   Code(s): I25.10 - ATHSCL HEART DISEASE OF 

NATIVE CORONARY ARTERY W/O ANG PCTRS   SNOMED Code(s): 32530592


   Comment: 


- Continue aspirin, fenofibrate


- he is statin intolerant. 


- is reportedly intolerant of beta blockers. 


- has new LBBB since TAVR, f/u ECHO   





(8) Diastolic CHF


Current Visit: No   Status: Chronic   Code(s): I50.30 - UNSPECIFIED DIASTOLIC (

CONGESTIVE) HEART FAILURE   SNOMED Code(s): 031947768


   Comment: 


- Continue lasix 20mg daily, euvolemic   





(9) GERD (gastroesophageal reflux disease)


Current Visit: No   Status: Chronic   Code(s): K21.9 - GASTRO-ESOPHAGEAL REFLUX 

DISEASE WITHOUT ESOPHAGITIS   SNOMED Code(s): 362894181


   Comment: 


- Continue omeprazole   





(10) Full code status


Current Visit: No   Status: Acute   Code(s): Z78.9 - OTHER SPECIFIED HEALTH 

STATUS   SNOMED Code(s): 805096245


   


Status and Disposition: 





change to inpatient, still awaiting MRI Brain and ECHO.

## 2019-07-01 VITALS — SYSTOLIC BLOOD PRESSURE: 137 MMHG | DIASTOLIC BLOOD PRESSURE: 59 MMHG

## 2019-07-01 LAB
ANION GAP SERPL CALC-SCNC: 4 MMOL/L (ref 2–11)
BUN SERPL-MCNC: 22 MG/DL (ref 6–24)
BUN/CREAT SERPL: 26.2 (ref 8–20)
CALCIUM SERPL-MCNC: 9.1 MG/DL (ref 8.6–10.3)
CHLORIDE SERPL-SCNC: 104 MMOL/L (ref 101–111)
GLUCOSE SERPL-MCNC: 96 MG/DL (ref 70–100)
HCO3 SERPL-SCNC: 31 MMOL/L (ref 22–32)
INR PPP/BLD: 1.68 (ref 0.82–1.09)
MAGNESIUM SERPL-MCNC: 1.9 MG/DL (ref 1.9–2.7)
POTASSIUM SERPL-SCNC: 4.6 MMOL/L (ref 3.5–5)
SODIUM SERPL-SCNC: 139 MMOL/L (ref 135–145)

## 2019-07-01 RX ADMIN — ENOXAPARIN SODIUM SCH MG: 100 INJECTION SUBCUTANEOUS at 05:10

## 2019-07-01 RX ADMIN — Medication SCH MG: at 08:50

## 2019-07-01 RX ADMIN — MECLIZINE HYDROCHLORIDE SCH MG: 12.5 TABLET ORAL at 17:33

## 2019-07-01 RX ADMIN — LEVOTHYROXINE SODIUM SCH MCG: 75 TABLET ORAL at 05:10

## 2019-07-01 RX ADMIN — MECLIZINE HYDROCHLORIDE SCH MG: 12.5 TABLET ORAL at 08:52

## 2019-07-01 RX ADMIN — MECLIZINE HYDROCHLORIDE SCH MG: 12.5 TABLET ORAL at 12:41

## 2019-07-01 RX ADMIN — POLYETHYLENE GLYCOL 3350 SCH: 17 POWDER, FOR SOLUTION ORAL at 08:57

## 2019-07-01 RX ADMIN — Medication SCH UNITS: at 08:51

## 2019-07-01 RX ADMIN — CYANOCOBALAMIN TAB 500 MCG SCH MCG: 500 TAB at 08:52

## 2019-07-01 RX ADMIN — PANTOPRAZOLE SODIUM SCH MG: 40 TABLET, DELAYED RELEASE ORAL at 08:52

## 2019-07-01 RX ADMIN — FUROSEMIDE SCH MG: 20 TABLET ORAL at 08:52

## 2019-07-01 RX ADMIN — FERROUS SULFATE TAB 325 MG (65 MG ELEMENTAL FE) SCH MG: 325 (65 FE) TAB at 08:51

## 2019-07-01 RX ADMIN — WARFARIN SODIUM SCH MG: 10 TABLET ORAL at 17:33

## 2019-07-01 RX ADMIN — Medication SCH MG: at 08:49

## 2019-07-01 RX ADMIN — Medication SCH CAP: at 08:48

## 2019-07-01 RX ADMIN — ENOXAPARIN SODIUM SCH MG: 100 INJECTION SUBCUTANEOUS at 15:40

## 2019-07-01 NOTE — CONS
CC:  Dr. Sadler *

 

NEUROLOGY CONSULT FOLLOWUP:

 

DATE OF FOLLOWUP:  07/01/19

 

LOCATION:  He is an inpatient in room 448.

 

HOSPITALIST:  Dr. Okeefe.

 

CHIEF COMPLAINT:  Right hand numbness.

 

INTERVAL HISTORY:  Since yesterday, Wilmer feels he is back to his normal. 
Specifically, he does not note any new weakness, numbness, or incoordination in 
his limbs.  He feels his speech and cognition are pretty normal.  He notes a 
little bit of slowness cognitively.  He has been steady on his feet.

 

MEDICATIONS:  Reviewed and he remains on:

1.  Vitamin D 5000 units p.o. daily.

2.  Lovenox 80 mg subcutaneous q.12 hours.

3.  TriCor 160 mg p.o. daily.

4.  Vitamin B12 1000 mcg p.o. daily.

5.  Lasix 20 mg p.o. daily.

6.  Levothyroxine 112 mcg p.o. daily.

7.  Meclizine 25 mg p.o. 4 times daily.

8.  Reglan 10 mg p.o. q.8 hours as needed for headache.

9.  Magnesium oxide 500 mg p.o. daily.

10.  Protonix 40 mg p.o. daily.

11.  Coumadin 10 mg p.o. daily.

 

PHYSICAL EXAM:  He is well hydrated.  Temperature 97.5, blood pressure 156/67, 
heart rate 64 and regular, respiratory rate is 20.  Oxygen saturation is 98% on 
room air.

 

Neurological Exam:  Facial musculature is symmetric.  Facial sensation to light 
touch is symmetric.  Palate and tongue are normal and speech is clear.  Eye 
movements are notable for impersistence of gaze and choppy smooth pursuit.  
Visual fields are full to confrontation.

 

Motor exam reveals no drift in the limbs.  He has good resistive strength in 
the legs bilaterally.  Sensory exam is notable for diminished light touch in 
the fingers bilaterally and absent light touch in the feet.

 

Memory is intact and language is fluent.

 

DIAGNOSTIC STUDIES/LAB DATA:  Includes an INR this morning of 1.68.  
Chemistries this morning are unremarkable.  CBC is notable for a platelet count 
of 140,000.

 

MRI of the brain is reviewed.  There are multiple small infarctions in multiple 
vascular territories consistent with multiple small cardioembolic strokes.

 

IMPRESSION AND PLAN:  Impression is that of multiple cardioembolic strokes 
after being off warfarin and having an aortic valve replaced.  Currently, he is 
neurologically at his baseline.  His INR is not yet therapeutic, but he says he 
has Lovenox at home and knows how to administer it.  I have spoken with Dr. Okeefe
, who is going to speak with Cardiology about whether or not he could be 
discharged home on bridging Lovenox and warfarin until his INR gets above 2.  I 
do not have any further recommendations at this point in time.

 

 [id number]/CPS #: [id number]

DELMIS

## 2019-07-01 NOTE — PN
Subjective


Date of Service: 07/01/19


Interval History: 


HD#3 on 7/1


87 yo male PMH antiphospholipid syndrome, severe AS s/p TAVR on 6/26 at 

[place], TIAs, Mienerre's Disease, CAD, PVD, HTN, HLD, carotid aterery 

stenosis admitted with episodes of right hand numbness and slurred speech while 

subtherapeutic INR





Overnight, no acute events, VSS


Labs INR 1.68


Tele: Some Trigeminy





This morning, reports hand sx resolvd, no further slurred speech. He has a mild 

HA, but thinks it is from caffeine withdrawal and improving with Tylenol alone. 

Otherwise pleasant and well, awaiting TIA w/u.








Objective


Active Medications: 








Acetaminophen (Tylenol Tab*)  650 mg PO Q4H PRN


   PRN Reason: FEVER/HEADACHE


   Last Admin: 07/01/19 08:53 Dose:  650 mg


Cholecalciferol (Vitamin D Tab*)  5,000 units PO DAILY [facility]


   Last Admin: 07/01/19 08:51 Dose:  5,000 units


Coenzyme Q10 (Coenzyme Q10 (Nf))  1 cap PO QAM [facility]


   Last Admin: 07/01/19 08:48 Dose:  1 cap


Cyanocobalamin (Vitamin B12 Tab*)  1,000 mcg PO QAM [facility]


   Last Admin: 07/01/19 08:52 Dose:  1,000 mcg


Enoxaparin Sodium (Lovenox(*))  80 mg SUBCUT Q12H [facility]


   Last Admin: 07/01/19 05:10 Dose:  80 mg


Fenofibrate (Tricor 160 Mg)  160 mg PO DAILY [facility]


   Last Admin: 07/01/19 08:52 Dose:  160 mg


Ferrous Sulfate (Ferrous Sulfate Tab*)  325 mg PO DAILY [facility]


   Last Admin: 07/01/19 08:51 Dose:  325 mg


Furosemide (Lasix Tab*)  20 mg PO DAILY [facility]


   Last Admin: 07/01/19 08:52 Dose:  20 mg


Levothyroxine Sodium (Synthroid Tab*)  112.5 mcg PO 0600 [facility]


   Last Admin: 07/01/19 05:10 Dose:  112.5 mcg


Meclizine HCl (Antivert Tab*)  25 mg PO QID [facility]


   Last Admin: 07/01/19 08:52 Dose:  25 mg


Melatonin (Melatonin)  9 mg PO BEDTIME [facility]


   Last Admin: 06/30/19 21:17 Dose:  9 mg


Metoclopramide HCl (Reglan Tab*)  10 mg PO Q8H PRN


   PRN Reason: Headache


Pto: Cbd Oil 20 Mg  20 mg PO QAM [facility]


   Last Admin: 07/01/19 08:49 Dose:  20 mg


Pto: Magnesium Oxide ([Magnesium] 500 Mg)  500 mg PO DAILY [facility]


   Last Admin: 07/01/19 08:50 Dose:  500 mg


Pantoprazole Sodium (Protonix Tab*)  40 mg PO [facility]


   Last Admin: 07/01/19 08:52 Dose:  40 mg


Polyethylene Glycol/Electrolytes (Miralax*)  17 gm PO DAILY [facility]


   Last Admin: 07/01/19 08:57 Dose:  Not Given


Warfarin Sodium (Coumadin Tab(*))  10 mg PO DAILY@1700 [facility]; Protocol


   Last Admin: 06/30/19 18:18 Dose:  10 mg








 Vital Signs - 8 hr











  07/01/19





  03:02


 


Temperature 98.0 F


 


Pulse Rate 48


 


Respiratory 17





Rate 


 


Blood Pressure 135/63





(mmHg) 


 


O2 Sat by Pulse 94





Oximetry 











Oxygen Devices in Use Now: None


Appearance: Pleasant man in NAD


Eyes: No Scleral Icterus, PERRLA


Ears/Nose/Mouth/Throat: NL Teeth, Lips, Gums, Mucous Membranes Moist


Neck: NL Appearance and Movements; NL JVP


Respiratory: - - Scant crackles in L Lung base otherwise CTABL


Cardiovascular: - - S1 S2, systolic murmur, mild ectopy but not consistent


Abdominal: NL Sounds; No Tenderness; No Distention, No Hepatosplenomegaly


Lymphatic: No Cervical Adenopathy


Extremities: No Edema


Skin: No Rash or Ulcers


Neurological: Alert and Oriented x 3, NL Sensation, NL Muscle Strength and Tone

, - - Normal finger to nose


Result Diagrams: 


 06/29/19 12:13





 07/01/19 06:09


Additional Lab and Data: 





 Laboratory Results - last 24 hr











  06/29/19 06/30/19 06/30/19





  12:13 06:19 06:19


 


INR (Anticoag Therapy)   1.30 H 


 


Sodium    138


 


Potassium    4.4


 


Chloride    105


 


Carbon Dioxide    27


 


Anion Gap    6


 


BUN    22


 


Creatinine    0.75


 


Est GFR ( Amer)    119.5


 


Est GFR (Non-Af Amer)    98.7


 


BUN/Creatinine Ratio    29.3 H


 


Glucose    130 H


 


Hemoglobin A1c  5.5  


 


Calcium    9.1


 


Magnesium    1.8 L


 


Urine Color   


 


Urine Appearance   


 


Urine pH   


 


Ur Specific Gravity   


 


Urine Protein   


 


Urine Ketones   


 


Urine Blood   


 


Urine Nitrate   


 


Urine Bilirubin   


 


Urine Urobilinogen   


 


Ur Leukocyte Esterase   


 


Urine Glucose   














  06/30/19





  14:25


 


INR (Anticoag Therapy) 


 


Sodium 


 


Potassium 


 


Chloride 


 


Carbon Dioxide 


 


Anion Gap 


 


BUN 


 


Creatinine 


 


Est GFR ( Amer) 


 


Est GFR (Non-Af Amer) 


 


BUN/Creatinine Ratio 


 


Glucose 


 


Hemoglobin A1c 


 


Calcium 


 


Magnesium 


 


Urine Color  Straw


 


Urine Appearance  Clear


 


Urine pH  7.0


 


Ur Specific Gravity  1.009 L


 


Urine Protein  Negative


 


Urine Ketones  Negative


 


Urine Blood  Negative


 


Urine Nitrate  Negative


 


Urine Bilirubin  Negative


 


Urine Urobilinogen  Negative


 


Ur Leukocyte Esterase  Negative


 


Urine Glucose  Negative











Diagnostic Imaging: 





MRI: 7/1: Multiple embolic events





Assess/Plan/Problems-Billing


Assessment: 


87 yo male PMH antiphospholipid syndrome, severe AS s/p TAVR on 6/26 at 

[place], TIAs, Mienerre's Disease, CAD, PVD, HTN, HLD, carotid aterery 

stenosis p/w episodes of right hand numbness and slurred speech while 

subtherapeutic INR (periprocedure hold).MRI showing small multiple embolic 

events








- Patient Problems


(1) Focal neurological deficit


Current Visit: Yes   Status: Acute   Code(s): R29.818 - OTHER SYMPTOMS AND 

SIGNS INVOLVING THE NERVOUS SYSTEM   SNOMED Code(s): 376961706


   Comment: - In setting of subtherapeutic INR (held for TAVR)


- Appreciate neuro recs, imaging now showing multiple small embolic events.


- Continue lovenox to coumadin bridge 80mg q12


- Intolerant of statins, continue fenofibrate 160mg daily


- Await MRI Brain and ECHO w/ Bubble(there was a question of intrapulmonary 

shunt on last ECHO of note given late bubbles)   





(2) Antiphospholipid syndrome


Current Visit: No   Status: Chronic   Code(s): D68.61 - ANTIPHOSPHOLIPID 

SYNDROME   SNOMED Code(s): 21761107


   Comment: - Goal INR 2-3


- Continue warfarin (10mg, home is 7.5mg) with lovenox bridge, INR 1.68 7/1   





(3) Meniere disease


Current Visit: Yes   Status: Acute   Code(s): H81.09 - MENIERE'S DISEASE, 

UNSPECIFIED EAR   SNOMED Code(s): 06800802


   Comment: - Meclizine to QID (home dose)   





(4) Aortic stenosis


Current Visit: No   Status: Acute   Code(s): I35.0 - NONRHEUMATIC AORTIC (VALVE

) STENOSIS   SNOMED Code(s): 54288387


   Comment: - Increased from mild to moderate - severe


- Follow-up with Cardiology outpatient   





(5) S/P TAVR (transcatheter aortic valve replacement)


Current Visit: Yes   Status: Acute   Code(s): Z95.2 - PRESENCE OF PROSTHETIC 

HEART VALVE   SNOMED Code(s): 6957275293142


   Comment: - Dr. Depta Rocheor General on 6/26/19   





(6) CAD (coronary artery disease)


Current Visit: No   Status: Chronic   Code(s): I25.10 - ATHSCL HEART DISEASE OF 

NATIVE CORONARY ARTERY W/O ANG PCTRS   SNOMED Code(s): 28152742


   Comment: - Continue aspirin, fenofibrate


- Reportedly intolerant of beta blockers. 


- has new LBBB since TAVR, f/u ECHO   





(7) Diastolic CHF


Current Visit: No   Status: Chronic   Code(s): I50.30 - UNSPECIFIED DIASTOLIC (

CONGESTIVE) HEART FAILURE   SNOMED Code(s): 926253911


   Comment: - Continue lasix 20mg daily, euvolemic   





(8) GERD (gastroesophageal reflux disease)


Current Visit: No   Status: Chronic   Code(s): K21.9 - GASTRO-ESOPHAGEAL REFLUX 

DISEASE WITHOUT ESOPHAGITIS   SNOMED Code(s): 775518016


   Comment: - Continue omeprazole   





(9) HLD (hyperlipidemia)


Current Visit: No   Status: Chronic   Code(s): E78.5 - HYPERLIPIDEMIA, 

UNSPECIFIED   SNOMED Code(s): 40574816


   Comment: - Pt is unable to tolerate statins   





(10) Hypothyroidism


Current Visit: No   Status: Chronic   Code(s): E03.9 - HYPOTHYROIDISM, 

UNSPECIFIED   SNOMED Code(s): 65286826


   Comment: - Continue levothyroxine   





(11) DVT prophylaxis


Current Visit: No   Status: Acute   Code(s): AWL2026 -    SNOMED Code(s): 

358526605


   Comment: - Lovenox bridge to warfarin (INR subtherapeutic).   





(12) Full code status


Current Visit: No   Status: Acute   Code(s): Z78.9 - OTHER SPECIFIED HEALTH 

STATUS   SNOMED Code(s): 147435509


   


Status and Disposition: 





inpatient, still awaiting ECHO, multp small embolisms will await PT and neuro 

recommendations for dispo

## 2019-07-01 NOTE — DS
DISCHARGE SUMMARY:

 

DATE OF ADMISSION:  06/29/19

 

DATE OF DISCHARGE:  07/01/19

 

PRIMARY CARE PROVIDER:  Tab Sadler MD.

 

DISPOSITION AT THE TIME OF DISCHARGE:  Stable to return to home.

 

PRIMARY DIAGNOSIS:  Cardioembolic stroke without residual deficits.

 

SECONDARY DIAGNOSES:

1.  Antiphospholipid syndrome.

2.  Severe aortic stenosis, status post TAVR 06/26/19 in [place].

3.  History of transient ischemic attack.

4.  Meniere's disease.

5.  Coronary artery disease.

6.  Peripheral vascular disease.

7.  Hypertension.

8.  Hyperlipidemia.

9.  Carotid artery stenosis.

 

MEDICATIONS AT THE TIME OF DISCHARGE:

1.  CBD oil 20 mg p.o. q.a.m.

2.  Vitamin D 5000 units p.o. daily.

3.  Enoxaparin 80 mg subcutaneous q.12 hours until INR is greater than 2 for 48 
hours.

4.  Ferrous sulfate 325 mg p.o. daily.

5.  Furosemide 20 mg p.o. daily.

6.  Levothyroxine 100 mcg p.o. q.a.m.

7.  Magnesium oxide 500 mg p.o. daily.

8.  Meclizine 25 mg p.o. q.i.d. p.r.n. for dizziness.

9.  Melatonin 2 mg p.o. q.h.s.

10.  Omeprazole 20 mg p.o. q.a.m.

11.  Polyethylene glycol 17 g p.o. daily p.r.n. constipation.

12.  TriCor 145 mg p.o. daily.

13.  Co-Q10 400 mg p.o. q.a.m.

14.  Warfarin 10 mg p.o. q.h.s. at 1700.

15.  Biotin 5000 mcg p.o. q.a.m.

16.  Calcium carbonate/vitamin D3 over-the-counter 1 tab p.o. daily.

17.  Colestipol 2 g p.o. b.i.d.

18.  Vitamin B12 tabs 1000 mcg p.o. q.a.m.

19.  Gabapentin 300 mg p.o. t.i.d.

20.  Glucosamine/chondroitin 1 tab p.o. daily.

21.  Multivitamin 1 tab p.o. daily.

22.  Omega-3 fatty acids 2000 mg p.o. b.i.d.

23.  Vitamin B complex 1 cap p.o. daily.

24.  Vitamin C 2000 mg p.o. b.i.d.

25.  Vitamin D3 at 5000 units p.o. daily.

 

Medication changes during this hospitalization include:  The up-titration of 
warfarin from 5 mg to 10 mg p.o. 1700 until INR is greater than 2 for 48 hours 
and also the addition of enoxaparin 80 mg subcutaneous b.i.d. until INR is 
greater than 2 for 48 hours, then discontinue.  Enoxaparin and continue 
warfarin as dosed per primary care provider.

 

HISTORY OF PRESENT ILLNESS AND HOSPITAL COURSE:  An 86-year-old male with past 
medical history as above, who presents with acute onset slurred speech as well 
as right hand numbness.  The patient recently had aortic valve replacement in 
[place], 06/26/19, and presented on 06/29/19 in the setting of a 
subtherapeutic INR.  Reports that postprocedure his INR has continued to be 
less than 2 and he had an acute onset right hand numbness with slurred speech 
and 10/10 headache.  On original evaluation in the emergency room, he did have 
a right nasolabial fold droopiness, some dysarthria, and slight dysmetria on 
the right side.  CTA was done 06/29/19 in the emergency room, which showed no 
large vessel occlusions and otherwise negative CTA neck.  Acute brain CT 
without contrast was done, which showed no acute intracranial mass or 
hemorrhage.  The patient was admitted for suspected TIA workup in the setting 
of subtherapeutic INR and known antiphospholipid syndrome.  She was admitted in 
the context of subtherapeutic INR which on admission was 1.22 and known 
antiphospholipid syndrome with acute neurologic deficit concerning for new 
cardioembolic stroke.  Hospital course by problem is as follows:

1.  Acute focal neurologic deficit.  MRI and echocardiogram were ordered. 
Neurology was consulted who felt that the patient with a high risk for 
cardioembolic stroke.  There was an echocardiogram done in May 2019 which did 
show late bubbles during the bubble study consistent with likely a shunt.  
Ultimately, MRI was done on 07/01/19 which showed multiple small areas of a 
restricted diffusion in the cerebral hemispheres.  Right cerebellum was 
consistent with small nonhemorrhagic embolic infarcts.  An echocardiogram was 
also done, though the results at the time of this dictation are not available.  
Per wet read on Cardiology, there is no significant focal changes since last 
echocardiogram done in May 2018.  Over the course of this hospitalization, the 
patient's right hand numbness and slurred speech resolved completely.  He 
worked with PT.  He was able to resume to his baseline neurologic status and 
Neurology saw the patient daily and felt that his neurologic deficit had 
completely resolved with no residual deficit in the setting of his multiple 
small cardioembolic infarcts.  The patient has already optimized from a 
secondary prevention status and Neurology felt that it was all secondary to a 
subtherapeutic INR and the patient was stable to be discharged to home while 
bridging.

2.  Subtherapeutic INR with known history of antiphospholipid syndrome.  The 
patient has a hypercoagulable state.  INR was 1.22 on admission.  His Coumadin 
dose was increased to 10 mg daily.  His INR on day of discharge is 1.68.  He 
continues to be bridged with Lovenox at 80 mg subcutaneous b.i.d. which the 
patient received Lovenox teaching on and is very well versed in how to give 
himself Lovenox.  He has a followup appointment for INR check on Wednesday, 07/
03/19 at 1:45 and he is instructed that INR must be greater than 2 for 48 hours 
before Lovenox can be discontinued and considered effectively bridged.

3.  History of aortic stenosis, status post recent TAVR.  The patient follows 
with Dr. Ash in [facility], tolerated the procedure fine.  Repeat 
echocardiogram is pending.  He is asymptomatic.  He is not volume overloaded.

4.  Meniere disease.  The patient's home meclizine was given.

5.  CAD.  He is on aspirin, fenofibrate.  He is reportedly intolerant of beta 
blockers as well as statins.  He does have a new left bundle branch block on 
EKG noted incidentally since his TAVR and an echocardiogram was ordered to 
assess for structural complications that per wet read by Cardiology was none.

6.  Diastolic heart failure.  The patient has already received volume control 
with Lasix 20 mg daily which he will continue.  He remained euvolemic during 
this hospitalization.

7.  GERD.  He was continued on home omeprazole.

8.  Hypothyroidism.  His home levothyroxine was continued.

 

On the day of discharge, the patient is ambulating, voiding freely, and feeding 
himself without complications.  Neurology as well as PT feel that he is cleared 
to be discharged to home and he has all required equipment to help him continue 
with his bridge as well as followup appointment within 48 hours.

 

LABS AND STUDIES DONE DURING THIS HOSPITALIZATION:  Imaging done during this 
hospitalization:  A head CT on 06/29/19 which showed no acute intracranial 
hemorrhage.  A chest x-ray on 06/29/19 showed cardiomegaly but otherwise no 
acute cardiopulmonary findings.  EKG 06/29/19:  Left bundle branch block, sinus 
rhythm. The left bundle branch is new since TAVR.  No other signs of acute 
ischemia.  Head CTA on 06/29/19 showed no stenosis or large vessel occlusion 
and brain MRI done on 07/01/19 showed acute, multiple, small, nonhemorrhagic 
areas of restricted diffusion over several hemispheres consistent with small 
cardioembolic events.

 

Labs:  INR on day of discharge is 1.68, sodium is 139, his potassium 4.6, 
chloride 104, carbon dioxide 31, BUN 22, creatinine 0.84, glucose 96.  The 
patient was risk stratified during initial hospitalization.  Hemoglobin A1c was 
5.5.  Lipids are within goal with LDL of 48.  Troponin was mildly elevated on 
admission without chest pain at 0.07.  TSH was 15.84 which can be followed up 
status post discharge.

 

ITEMS TO FOLLOW UP ON STATUS POST DISCHARGE:

1.  Subtherapeutic INR in the setting of antiphospholipid syndrome with small 
cardioembolic strokes without residual deficits.  The patient is currently 
bridging with Lovenox to warfarin, has followup INR at primary care provider's 
office within 48 hours and we are arranging for a hospital followup appointment 
to continue to address his mild hypothyroidism as well as oversee bridging 
process.

2.  New left bundle branch block, unclear the importance of this in the setting 
of recent TAVR.  Repeat echocardiogram was ordered.  The patient had no chest 
pain. He has very low concern for new ischemic events and he was already risk 
optimized for TAVR.  He is to follow up with [facility].  We will 
addend the echocardiogram results when they are available to this discharge 
summary, although no acute regional wall motion abnormalities by brief read on 
Cardiology prior to dictation.

3.  Hypothyroidism with TSH elevated at 15.  The patient reports taking 
levothyroxine, although unclear if he takes it on an empty stomach and without 
other medications.  Follow up with primary care provider to discuss optimal 
dosing of levothyroxine from 100 to possible dose increase and can be discussed 
at next followup appointment if the patient is symptomatic.

4.  Physical exam was done day of discharge, which can be found on the progress 
report.

 

DISPOSITION AT THE TIME OF DISCHARGE:  Stable to return to home.

 

TIME SPENT ON DISCHARGE:  Sixty minutes was spent on the planning of this 
discharge with over half of the spent directly at the bedside of the patient 
providing direct patient care.  Plan of care was discussed with the patient's 
family who agree with findings.  Can adhere to Lovenox bridging, have secured 
followup appointment, have all supplies and equipment in order to use 
successfully at home.  They were counseled to return if new or worsening 
symptoms or any other further concerns.  If there are any questions about the 
care of this patient during this hospitalization, please do not hesitate to 
reach out.

 

 [id number]/[facility] #: [id number]

DELMIS

## 2019-08-12 ENCOUNTER — HOSPITAL ENCOUNTER (OUTPATIENT)
Dept: HOSPITAL 25 - CHICATH | Age: 84
Setting detail: OBSERVATION
LOS: 1 days | Discharge: HOME | End: 2019-08-13
Attending: SPECIALIST | Admitting: SPECIALIST
Payer: MEDICARE

## 2019-08-12 DIAGNOSIS — I73.9: ICD-10-CM

## 2019-08-12 DIAGNOSIS — I47.2: ICD-10-CM

## 2019-08-12 DIAGNOSIS — K21.9: ICD-10-CM

## 2019-08-12 DIAGNOSIS — Z79.899: ICD-10-CM

## 2019-08-12 DIAGNOSIS — Z95.4: ICD-10-CM

## 2019-08-12 DIAGNOSIS — Z79.01: ICD-10-CM

## 2019-08-12 DIAGNOSIS — R42: ICD-10-CM

## 2019-08-12 DIAGNOSIS — D68.61: ICD-10-CM

## 2019-08-12 DIAGNOSIS — K52.9: ICD-10-CM

## 2019-08-12 DIAGNOSIS — I25.10: Primary | ICD-10-CM

## 2019-08-12 DIAGNOSIS — I25.2: ICD-10-CM

## 2019-08-12 DIAGNOSIS — I10: ICD-10-CM

## 2019-08-12 DIAGNOSIS — E78.00: ICD-10-CM

## 2019-08-12 DIAGNOSIS — I48.0: ICD-10-CM

## 2019-08-12 DIAGNOSIS — Z95.5: ICD-10-CM

## 2019-08-12 PROCEDURE — 96366 THER/PROPH/DIAG IV INF ADDON: CPT

## 2019-08-12 PROCEDURE — 93005 ELECTROCARDIOGRAM TRACING: CPT

## 2019-08-12 PROCEDURE — 96365 THER/PROPH/DIAG IV INF INIT: CPT

## 2019-08-12 PROCEDURE — C1895 LEAD, AICD, ENDO DUAL COIL: HCPCS

## 2019-08-12 PROCEDURE — C1898 LEAD, PMKR, OTHER THAN TRANS: HCPCS

## 2019-08-12 PROCEDURE — 99156 MOD SED OTH PHYS/QHP 5/>YRS: CPT

## 2019-08-12 PROCEDURE — C1721 AICD, DUAL CHAMBER: HCPCS

## 2019-08-12 PROCEDURE — 99157 MOD SED OTHER PHYS/QHP EA: CPT

## 2019-08-12 PROCEDURE — 33249 INSJ/RPLCMT DEFIB W/LEAD(S): CPT

## 2019-08-12 PROCEDURE — 71046 X-RAY EXAM CHEST 2 VIEWS: CPT

## 2019-08-12 PROCEDURE — 71045 X-RAY EXAM CHEST 1 VIEW: CPT

## 2019-08-12 PROCEDURE — G0378 HOSPITAL OBSERVATION PER HR: HCPCS

## 2019-08-12 RX ADMIN — OXYCODONE HYDROCHLORIDE AND ACETAMINOPHEN PRN TAB: 5; 325 TABLET ORAL at 21:17

## 2019-08-12 RX ADMIN — SODIUM CHLORIDE SCH MLS/HR: 900 IRRIGANT IRRIGATION at 11:52

## 2019-08-12 RX ADMIN — SODIUM CHLORIDE SCH MLS/HR: 900 IRRIGANT IRRIGATION at 08:50

## 2019-08-12 RX ADMIN — CEFAZOLIN SCH MLS/HR: 330 INJECTION, POWDER, FOR SOLUTION INTRAMUSCULAR; INTRAVENOUS at 15:34

## 2019-08-12 RX ADMIN — OXYCODONE HYDROCHLORIDE AND ACETAMINOPHEN PRN TAB: 5; 325 TABLET ORAL at 13:09

## 2019-08-12 NOTE — OP
CC:  Dr. Prado *

 

DATE OF OPERATION:  08/12/19 - ROOM #442

 

DATE OF BIRTH:  [birth year]

 

SURGEON:  Renato Antony MD.

 

PRE-OP DIAGNOSES:  Aortic valve replacement, non-sustained ventricular 
tachycardia, atrial fibrillation.

 

POST-OP DIAGNOSES:  Aortic valve replacement, nonsustained ventricular 
tachycardia, atrial fibrillation.

 

OPERATIVE PROCEDURE:  Dual chamber ICD implantation.

 

ANESTHESIA:  Local anesthesia with conscious sedation.

 

ESTIMATED BLOOD LOSS:  Nil.

 

COMPLICATIONS:  None.

 

INDICATIONS:  The patient is an 86-year-old gentleman who underwent aortic 
valve replacement a couple of months ago.  The patient has an event monitor 
implanted by Dr. Prado, which he has been following closely.  The patient has 
been demonstrating episodes of atrial fibrillation as well as episodes of 
nonsustained ventricular tachycardia up to 22 beats in duration.  The patient 
does have normal LV function and a left bundle-branch block.  Dual chamber ICD 
implantation was recommended.  The patient does not meet criteria for 
biventricular device.

 

DESCRIPTION OF PROCEDURE:  The patient was brought to the procedure room in a 
fasting state.  Informed consent was obtained prior to the procedure.  All labs 
were reviewed.  The patient was placed supine on the procedure table.  His left 
deltopectoral area was cleaned and draped in the usual fashion; 1% lidocaine 
was used for local anesthesia.  Under ultrasound guidance, the axillary vein 
was entered via Seldinger technique and a guidewire was placed.  The second 
guidewire was placed during the same technique.  A 4-cm incision was made in 
the pectoral area.  Blunt dissection was carried down to the pectoral fascia 
and a pocket was fashioned for the ICD.  Over the first guidewire, an 8-[place] 
sheath introducer was placed through which a right ventricular ICD lead was 
advanced to the RV apex.  The ICD lead is a [employer] model TXW667P, 
serial number [id number].  It had an R- wave sensitivity of 27, impendence 636 
ohms, threshold 0.9 volts at 0.5 msec.  The ventricular lead was sutured to the 
pectoral fascia.  Over the second guidewire, a 7-[place] sheath introducer was 
placed through which a right atrial lead was advanced to the high right atrium.
  The right atrial lead is a [employer] model 2088TC serial number 
[id number] at a P-wave sensitivity of 1.1, impendence 420 Ohms, threshold 0.8 
volts at 0.5 msec.  Atrial lead was sutured to the pectoral fascia.  The pocket 
was flushed.  A generator was attached appropriately to the atrioventricular 
lead.  The generator is a [employer], model CY8407, serial number 
[id number].  The device was placed in the pocket.  The surgical incision was 
closed in three layers.  The patient was returned to the holding in area stable 
condition.

 

 [id number]/[facility] #: [id number]

DELMIS

## 2019-08-13 VITALS — DIASTOLIC BLOOD PRESSURE: 55 MMHG | SYSTOLIC BLOOD PRESSURE: 132 MMHG

## 2019-08-13 RX ADMIN — CEFAZOLIN SCH MLS/HR: 330 INJECTION, POWDER, FOR SOLUTION INTRAMUSCULAR; INTRAVENOUS at 08:39

## 2019-08-13 RX ADMIN — SODIUM CHLORIDE SCH MLS/HR: 900 IRRIGANT IRRIGATION at 00:10

## 2019-08-13 RX ADMIN — MECLIZINE HYDROCHLORIDE SCH MG: 12.5 TABLET ORAL at 12:14

## 2019-08-13 RX ADMIN — OXYCODONE HYDROCHLORIDE AND ACETAMINOPHEN PRN TAB: 5; 325 TABLET ORAL at 08:38

## 2019-08-13 RX ADMIN — OXYCODONE HYDROCHLORIDE AND ACETAMINOPHEN PRN TAB: 5; 325 TABLET ORAL at 15:48

## 2019-08-13 RX ADMIN — MECLIZINE HYDROCHLORIDE SCH MG: 12.5 TABLET ORAL at 16:53

## 2019-08-13 RX ADMIN — CEFAZOLIN SCH MLS/HR: 330 INJECTION, POWDER, FOR SOLUTION INTRAMUSCULAR; INTRAVENOUS at 00:11

## 2019-08-13 NOTE — DS
DISCHARGE SUMMARY:

 

DATE OF ADMISSION:  08/12/19

 

DATE OF DISCHARGE:  08/13/19

 

INDICATION FOR ADMISSION:  Dual chamber ICD implantation.

 

Please see admission history and physical for details of the patient's 
presentation.

 

The patient is an 86-year-old gentleman with a history of aortic valve 
replacement, also history of paroxysmal atrial fibrillation.  He had an event 
monitor implanted by Dr. Prado, which has been monitoring his rhythm.  The 
patient was noted to have up to 20 beats of ventricular tachycardia on the 
monitor.  Despite the fact he has normal LV function, the patient was 
recommended to have ICD implantation because of his prolonged episodes of 
ventricular tachycardia.

 

SUMMARY OF HOSPITAL COURSE:  The patient was brought to the operating room.  He 
underwent dual chamber ICD implantation.  His ICD is a [employer], model 
[id number].  Implantation went without difficulty.  The patient was observed 
overnight and is ready for discharge this morning.

 

His ICD interrogation today demonstrates that his atrial lead has a impedance 
of 450 ohms, sensing threshold 1 millivolt, capture threshold of 0.75 volts at 
0.5 milliseconds.  The ventricular lead has an impedance of 590 ohms.  R-wave 
sensitivity of 12, capture threshold 0.5 volts at 0.5 milliseconds.  The patient
's ICD is programmed appropriately.  His base rate is 60 beats per minute.

 

Chest x-ray today demonstrates normal lead position.  There is no evidence of 
pneumothorax.

 

DISCHARGE MEDICATIONS:

1.  Praluent 75 mg every 2 weeks.

2.  Biotin 5000 microgram q.a.m.

3.  Calcium tablets.

4.  Iron tablets 325 daily.

5.  Lasix 20 mg a day.

6.  Gabapentin 300 mg 3 times a day.

7.  Glucosamine 1 tablet a day.

8.  Guaifenesin 400 mg tablets, 800 mg b.i.d.

9.  Levothyroxine 100 microgram a day.

10.  Lisinopril 2.5 mg a day.

11.  Magnesium 400 mg a day.

12.  Melatonin 10 mg a day.

13.  Multivitamin a day.

14.  Omeprazole 20 mg a day.

15.  Temazepam 7.5 mg 1 tablet q.h.s.

16.  Ubidecarenone 100 mg tablets, 400 mg a day.

17.  Coumadin as directed, which will be restarted today.

18.  Meclizine 12.5 mg 3 times a day as needed.

 

ALLERGIES:  To ZETIA, BETA-BLOCKERS.

 

DISPOSITION:  The patient will be discharged home.  The patient is stable for 
discharge.

 

FOLLOWUP:  The patient will follow up with me in 1 week.

 

PHYSICAL EXAMINATION:  Vital signs is stable.  Lungs are clear to auscultation. 
Cardiac Exam:  S1, S2 with 1/6 systolic ejection murmur.  Extremities show no 
edema.  His pacer ICD site on his left side is stable.  There is no erythema. 
There is no hematoma.  There is no ecchymosis.  New dressing was applied.

 

DISPOSITION:  The patient will be discharged home in stable condition.

 

 [id number]/[facility] #: [id number]

MTDD

## 2019-12-04 ENCOUNTER — HOSPITAL ENCOUNTER (OUTPATIENT)
Dept: HOSPITAL 25 - ED | Age: 84
Setting detail: OBSERVATION
LOS: 1 days | Discharge: HOME | End: 2019-12-05
Attending: HOSPITALIST | Admitting: INTERNAL MEDICINE
Payer: MEDICARE

## 2019-12-04 DIAGNOSIS — I25.10: ICD-10-CM

## 2019-12-04 DIAGNOSIS — H81.09: ICD-10-CM

## 2019-12-04 DIAGNOSIS — E78.00: ICD-10-CM

## 2019-12-04 DIAGNOSIS — G45.9: Primary | ICD-10-CM

## 2019-12-04 DIAGNOSIS — E78.5: ICD-10-CM

## 2019-12-04 DIAGNOSIS — Z95.2: ICD-10-CM

## 2019-12-04 DIAGNOSIS — Z86.73: ICD-10-CM

## 2019-12-04 DIAGNOSIS — I10: ICD-10-CM

## 2019-12-04 DIAGNOSIS — I73.9: ICD-10-CM

## 2019-12-04 DIAGNOSIS — I35.0: ICD-10-CM

## 2019-12-04 DIAGNOSIS — I25.2: ICD-10-CM

## 2019-12-04 DIAGNOSIS — Z79.01: ICD-10-CM

## 2019-12-04 DIAGNOSIS — E03.9: ICD-10-CM

## 2019-12-04 DIAGNOSIS — Z79.899: ICD-10-CM

## 2019-12-04 DIAGNOSIS — Z95.810: ICD-10-CM

## 2019-12-04 DIAGNOSIS — D68.61: ICD-10-CM

## 2019-12-04 DIAGNOSIS — Z95.5: ICD-10-CM

## 2019-12-04 LAB
ALBUMIN SERPL BCG-MCNC: 4.1 G/DL (ref 3.2–5.2)
ALBUMIN/GLOB SERPL: 1.6 {RATIO} (ref 1–3)
ALP SERPL-CCNC: 55 U/L (ref 34–104)
ALT SERPL W P-5'-P-CCNC: 26 U/L (ref 7–52)
ANION GAP SERPL CALC-SCNC: 7 MMOL/L (ref 2–11)
APTT PPP: 46.4 SECONDS (ref 26–38)
AST SERPL-CCNC: 32 U/L (ref 13–39)
BASOPHILS # BLD AUTO: 0.1 10^3/UL (ref 0–0.2)
BUN SERPL-MCNC: 27 MG/DL (ref 6–24)
BUN/CREAT SERPL: 26.7 (ref 8–20)
CALCIUM SERPL-MCNC: 9.7 MG/DL (ref 8.6–10.3)
CHLORIDE SERPL-SCNC: 104 MMOL/L (ref 101–111)
CHOLEST SERPL-MCNC: 128 MG/DL
EOSINOPHIL # BLD AUTO: 0.6 10^3/UL (ref 0–0.6)
GLOBULIN SER CALC-MCNC: 2.6 G/DL (ref 2–4)
GLUCOSE SERPL-MCNC: 87 MG/DL (ref 70–100)
HCO3 SERPL-SCNC: 27 MMOL/L (ref 22–32)
HCT VFR BLD AUTO: 44 % (ref 42–52)
HDLC SERPL-MCNC: 42.8 MG/DL
HGB BLD-MCNC: 15.1 G/DL (ref 14–18)
INR PPP/BLD: 2.34 (ref 0.82–1.09)
LYMPHOCYTES # BLD AUTO: 1.4 10^3/UL (ref 1–4.8)
MCH RBC QN AUTO: 29 PG (ref 27–31)
MCHC RBC AUTO-ENTMCNC: 34 G/DL (ref 31–36)
MCV RBC AUTO: 87 FL (ref 80–94)
MONOCYTES # BLD AUTO: 1.1 10^3/UL (ref 0–0.8)
NEUTROPHILS # BLD AUTO: 5.1 10^3/UL (ref 1.5–7.7)
NRBC # BLD AUTO: 0 10^3/UL
NRBC BLD QL AUTO: 0
PLATELET # BLD AUTO: 152 10^3/UL (ref 150–450)
POTASSIUM SERPL-SCNC: 4.4 MMOL/L (ref 3.5–5)
PROT SERPL-MCNC: 6.7 G/DL (ref 6.4–8.9)
RBC # BLD AUTO: 5.12 10^6 /UL (ref 4.18–5.48)
SODIUM SERPL-SCNC: 138 MMOL/L (ref 135–145)
TRIGL SERPL-MCNC: 147 MG/DL
TROPONIN I SERPL-MCNC: 0.01 NG/ML (ref ?–0.03)
TSH SERPL-ACNC: 2.7 MCIU/ML (ref 0.34–5.6)
VIT C UR QL: (no result)
WBC # BLD AUTO: 8.2 10^3/UL (ref 3.5–10.8)
WBC UR QL AUTO: (no result)

## 2019-12-04 PROCEDURE — 87086 URINE CULTURE/COLONY COUNT: CPT

## 2019-12-04 PROCEDURE — 96360 HYDRATION IV INFUSION INIT: CPT

## 2019-12-04 PROCEDURE — 83605 ASSAY OF LACTIC ACID: CPT

## 2019-12-04 PROCEDURE — G0378 HOSPITAL OBSERVATION PER HR: HCPCS

## 2019-12-04 PROCEDURE — 70496 CT ANGIOGRAPHY HEAD: CPT

## 2019-12-04 PROCEDURE — 84443 ASSAY THYROID STIM HORMONE: CPT

## 2019-12-04 PROCEDURE — 85730 THROMBOPLASTIN TIME PARTIAL: CPT

## 2019-12-04 PROCEDURE — 85610 PROTHROMBIN TIME: CPT

## 2019-12-04 PROCEDURE — 80053 COMPREHEN METABOLIC PANEL: CPT

## 2019-12-04 PROCEDURE — 99284 EMERGENCY DEPT VISIT MOD MDM: CPT

## 2019-12-04 PROCEDURE — 84484 ASSAY OF TROPONIN QUANT: CPT

## 2019-12-04 PROCEDURE — 70498 CT ANGIOGRAPHY NECK: CPT

## 2019-12-04 PROCEDURE — 96361 HYDRATE IV INFUSION ADD-ON: CPT

## 2019-12-04 PROCEDURE — 93005 ELECTROCARDIOGRAM TRACING: CPT

## 2019-12-04 PROCEDURE — 81015 MICROSCOPIC EXAM OF URINE: CPT

## 2019-12-04 PROCEDURE — 80061 LIPID PANEL: CPT

## 2019-12-04 PROCEDURE — 85025 COMPLETE CBC W/AUTO DIFF WBC: CPT

## 2019-12-04 PROCEDURE — G0480 DRUG TEST DEF 1-7 CLASSES: HCPCS

## 2019-12-04 PROCEDURE — 80048 BASIC METABOLIC PNL TOTAL CA: CPT

## 2019-12-04 PROCEDURE — 80320 DRUG SCREEN QUANTALCOHOLS: CPT

## 2019-12-04 PROCEDURE — 93306 TTE W/DOPPLER COMPLETE: CPT

## 2019-12-04 PROCEDURE — 36415 COLL VENOUS BLD VENIPUNCTURE: CPT

## 2019-12-04 PROCEDURE — 70450 CT HEAD/BRAIN W/O DYE: CPT

## 2019-12-04 PROCEDURE — 81003 URINALYSIS AUTO W/O SCOPE: CPT

## 2019-12-04 RX ADMIN — OXYCODONE HYDROCHLORIDE AND ACETAMINOPHEN SCH MG: 500 TABLET ORAL at 22:03

## 2019-12-04 RX ADMIN — MECLIZINE HYDROCHLORIDE PRN MG: 12.5 TABLET ORAL at 22:05

## 2019-12-04 RX ADMIN — GABAPENTIN SCH MG: 300 CAPSULE ORAL at 22:04

## 2019-12-04 NOTE — XMS REPORT
Continuity of Care Document (CCD)

 Created on:2019



Patient:Wilmer Campbell

Sex:Male

:1933

External Reference #:[id number]





Demographics







 Address  [address] [place] [address]

 

 Mobile Phone  [phone]

 

 Email Address  [email or web address]

 

 Preferred Language  en

 

 Marital Status  Not  or 

 

 Islam Affiliation  Unknown

 

 Race  White

 

 Ethnic Group  Not  or 









Author







 Name  Alan Pichardo NP (transmitted by agent of provider Jessica Carrillo)

 

 Address  [address] [place] [address], [address]



   [place] [address]









Care Team Providers







 Name  Role  Phone

 

 Tab Sadler MD - Family  Care Team Information   [phone]



 Medicine    

 

 Tiffani Nino MD - Physical  Care Team Information   [phone]
[phone]



 Medicine & Rehabilitation    

 

 Navdeep Klein MD - Emergency  Care Team Information   [phone]



 Medicine    

 

 April Martins FNP - Family  Care Team Information   [phone]
[phone]

 

 Althea Ngo MD - Internal  Care Team Information   [phone]



 Medicine    









Problems







 Active Problems  Provider  Date

 

 Arteriosclerosis of autologous vein  Jaquelin Prado M.D.  Onset: 2013



 coronary artery bypass graft    

 

 Essential hypertension  Jaquelin Prado M.D.  Onset: 2013

 

 Aortic valve disorder  Jaquelin Prado M.D.  Onset: 2013

 

 Mixed hyperlipidemia  Jaquelin Prado M.D.  Onset: 2013

 

 Difficulty breathing  Alla Jensen MD  Onset: 2015

 

 Conduction disorder of the heart  Jaquelin Prado M.D.  Onset: 2015

 

 Arteriosclerosis of coronary artery  Jaquelin Prado M.D.  Onset: 2015



 bypass graft    

 

 Carotid artery occlusion  Jaquelin Prado M.D.  Onset: 2015

 

 Disorder of lung  Alla Jensen MD  Onset: 2016

 

 Localized, primary osteoarthritis of  Jenny Snyder MD  Onset: 2016



 the shoulder region    

 

 Full thickness rotator cuff tear  Jenny Snyder MD  Onset: 2016

 

 Edema  Jaquelin Prado M.D.  Onset: 2018

 

 Atherosclerosis of arteries of the  Shravan Guevara MD, [facility],  Onset: 03/15/
2018



 extremities  [facility]  

 

 Coronary arteriosclerosis in patient  Jaquelin Prado M.D.  Onset: 2019



 with history of previous myocardial    



 infarction    

 

 Heart valve replacement  Jaquelin Prado M.D.  Onset: 09/10/2019

 

 Left bundle branch block  Jaquelin Prado M.D.  Onset: 09/10/2019

 

 Automatic implantable cardiac  Jaquelin Prado M.D.  Onset: 09/10/2019



 defibrillator in situ    

 

 Paroxysmal ventricular tachycardia  Jaquelin Prado M.D.  Onset: 09/10/2019







Social History







 Type  Date  Description  Comments

 

 Birth Sex    Unknown  

 

 Tobacco Use  Start: Unknown  Never Smoked Cigarettes  

 

 Smoking Status  Reviewed: 19  Never Smoked Cigarettes  

 

 ETOH Use    Denies alcohol use  

 

 Tobacco Use  Start: Unknown  Patient has never smoked  

 

 Recreational Drug Use    Never Used Drugs  

 

 Exercise Type/Frequency    Does not exercise  







Allergies, Adverse Reactions, Alerts







 Active Allergies  Reaction  Severity  Comments  Date

 

 Niacin        10/28/2013

 

 Zocor      increased CPK  10/28/2013

 

 Zetia      muscle aches  10/28/2013

 

 Beta Adrenergic Blockers      dizzy and low heart rate  10/28/2013

 

 IV Dye  headache      10/28/2013

 

 Statins        2014







Medications







 Active Medications  SIG  Qnty  Indications  Ordering  Date



         Provider  

 

 Metoprolol Succinate  1 by mouth every  90tabs    Jaquelin Prado,  2019



 ER  day      M.D.  



  25mg Tablets ER 24HR          



           

 

 Co Q-10 Maximum  1 tab PO q day  90caps  E78.2  Jaquelin Prado,  2019



 Strength        M.D.  



        400mg Capsules          



           

 

 Lisinopril  1 by mouth every      Unknown  2019



          2.5mg  day        



 Tablets          



           

 

 Aleve  1 po every      Unknown  2018



     220mg Capsules  morning and 1 prn        



   in the afternoon        

 

 Praluent  1 injection sc  2ml    Jaquelin Prado,  02/15/2018



        75mg/ml  every 2 weeks      M.D.  



 Solution Pen-Inject          



           

 

 Colestid  3 tabs by mouth  540tabs    Jaquelin Prado,  2013



        1gm Tablets  twice a day      M.D.  



           

 

 Vitamin B Complex  1 by mouth every      Unknown  



   day        



 Tablets          



           

 

 Calcium 500 + D  1 by mouth twice      Unknown  



   a day        



 500-125mg-Unit          



 Tablets          



           

 

 Ferrous Sulfate  take 1 tablet      Unknown  



   once daily.        



 325(65Fe) mg Tablets          



           

 

 Glucosamine  2 by mouth every      Unknown  



 Chondroitin 1500  day        



 Complex          



       1500Com          



 Capsules          



           

 

 Magnesium Oxide  1 by mouth every      Unknown  



               400mg  day        



 Tablets          



           

 

 CBD Oil  20mg 1 cap po      Unknown  



        Capsule  daily Am        



           

 

 Temazepam  1-2 at night as      Unknown  



         7.5mg  needed.        



 Capsules          



           

 

 Levothyroxine Sodium  1 by mouth every      Unknown  



   day        



 112mcg Tablets          



           

 

 Vitamin D3 High  1 by mouth every      Unknown  



 Potency  day        



       1000Unit          



 Capsules          



           

 

 Vitamin C  4 by mouth every      Unknown  



         1000mg  day        



 Tablets          



           

 

 Ginkoba  1 tab by mouth      Unknown  



       40mg Tablets  every day        



           

 

 Arnica [place]  1 tab a day      Unknown  



               Tablets          



 Sub          

 

 Amoxicillin  4 tablets 1 hour  12caps    Jaquelin Prado,  



           500mg  before dental      M.D.  



 Capsules  work life long        



           

 

 Multivitamins  1 by mouth every      Unknown  



              Capsules  day        



           

 

 Biotin  1 po qd      Unknown  



      5000mcg Tablets          



           

 

 Vitamin B-12  1 by mouth every      Unknown  



            1000mcg  day        



 Tablets Sub          



           

 

 Miralax  17 gm qd  1mon    Unknown  



       3350NF Packet          



           

 

 Guaifenesin  2 tablet po b.i.d  60tabs    Unknown  



           400mg  as needed        



 Tablets          



           

 

 Meclizine HCL  1 po qid as  90tabs    Unknown  



             25mg  needed        



 Tablets          



           

 

 Omeprazole  1 po qd  90caps    Unknown  



          20mg          



 Capsules DR Belcher  2-3 times daily  30caps    Unknown  



         300mg          



 Capsules          



           

 

 Lasix  1 tab by mouth  10tabs    Aixa Yuan,  



     20mg Tablets  daily      N.P.  



           

 

 Fish Oil  2 po bid      Unknown  



        1000mg          



 Capsules          



           

 

 Coumadin  as directed by  90daysup    Unknown  



        5mg Tablets  Dr. Sadler        



           









 History Medications









 Metoprolol Succinate  1 by mouth every  90tabs    Jaquelin Prado,  10/31/2019 -



 ER  day      M.D.  2019



   25mg Tablets ER          



 24HR          



           

 

 Cephalexin  1 cap by mouth  30caps    Renato ALEMANTodd Antony,  08/15/2019 -



           250mg  three times a day      M.D.  2019



 Capsules          



           

 

 Cephalexin  one tab 3 times a  30tabs    Renato ALEMANTodd Antony,  2019 -



           250mg  day for ten days      M.D.  08/15/2019



 Tablets          



           

 

 Lovenox  80mg every 12 hours  20units    Jaquelin Prado,  2019 -



        80mg/0.8ML  ( pt will need life      M.D.  2019



 Solution  long for procedure        



   which will require        



   him to off        



   coumadin)        







Medications Administered in Office







 Medication  SIG  Qnty  Indications  Ordering Provider  Date

 

 Technetium TC 99M        Presley Harvey M.D.,  2019



 Tetrofosmin, Per Unit Dose Up        [facility], FASNC  



 To 40 Millicuries          



          Injection          



           

 

 Inj, Regadenoson, 0.1 MG        iMck Graves, DO [facility]  2017



                 Injection          



           

 

 Aminophylline        Mick Graves, DO [facility]  2017



      Injection          



           

 

 Technetium TC 99M        Mick Graves, DO [facility]  2017



 Tetrofosmin, Per Unit Dose Up          



 To 40 Millicuries          



          Injection          



           

 

 Triamcinolone (Kenalog)        Jenny Snyder MD  2016



                Injection          



           

 

 Technetium TC 99M        Presley Harvey M.D.,  03/10/2015



 Tetrofosmin, Per Unit Dose Up        [facility], FASNC  



 To 40 Millicuries          



          Injection          



           

 

 Technetium TC 99M        Jaquelin Prado M.D.  03/10/2015



 Tetrofosmin, Per Unit Dose Up          



 To 40 Millicuries          



          Injection          



           

 

 Depomedrol 80MG        Tera Cast M.D.  2014



        Injection          



           

 

 Inj, Regadenoson, 0.1 MG        Jaquelin Prado M.D.  10/11/2012



                 Injection          



           

 

 Technetium TC 99M        Jaquelin Prado M.D.  10/11/2012



 Tetrofosmin, Per Unit Dose Up          



 To 40 Millicuries          



          Injection          



           







Immunizations







 Description

 

 No Information Available







Vital Signs







 Date  Vital  Result  Comment

 

 2019  1:29pm  Height  66.5 inches  5'6.50"









 Weight  178.00 lb  

 

 Heart Rate  60 /min  

 

 BP Systolic  110 mmHg  

 

 BP Diastolic  54 mmHg  

 

 BMI (Body Mass Index)  28.3 kg/m2  









 2019  2:14pm  Height  66.5 inches  5'6.50"









 Weight  177.50 lb  

 

 Heart Rate  62 /min  

 

 BP Systolic  102 mmHg  

 

 BP Diastolic  60 mmHg  

 

 BMI (Body Mass Index)  28.2 kg/m2  







Results







 Test  Acquired Date  Facility  Test  Result  H/L  Range  Note

 

 Basic Metabolic  2019  [facility]  Sodium  136 mmol/L  Normal
  135-145  



 Panel    [address]          



     [place] [address] [phone]          









 Potassium  4.1 mmol/L  Normal  3.5-5.0  

 

 Chloride  102 mmol/L  Normal  101-111  

 

 Co2 Carbon Dioxide  31 mmol/L  Normal  22-32  

 

 Anion Gap  3 mmol/L  Normal  2-11  

 

 Glucose  104 mg/dL  High    

 

 Blood Urea Nitrogen  19 mg/dL  Normal  6-24  

 

 Creatinine  0.84 mg/dL  Normal  0.67-1.17  

 

 BUN/Creatinine Ratio  22.6  High  8-20  

 

 Calcium  9.5 mg/dL  Normal  8.6-10.3  

 

 Egfr Non-  86.6    >60  

 

 Egfr   104.8    >60  1









 CBC Auto  2019  [facility]  White Blood  7.5 10^3/uL  Normal  
3.5-10.8  



 Diff    [address]  Count        



     [place] [address] [phone]          









 Red Blood Count  4.49 10^6/uL  Normal  4.18-5.48  

 

 Hemoglobin  13.3 g/dL  Low  14.0-18.0  

 

 Hematocrit  40 %  Low  42-52  

 

 Mean Corpuscular Volume  88 fL  Normal  80-94  

 

 Mean Corpuscular Hemoglobin  30 pg  Normal  27-31  

 

 Mean Corpuscular HGB Conc  34 g/dL  Normal  31-36  

 

 Red Cell Distribution Width  15 %  Normal  10-15  

 

 Platelet Count  217 10^3/uL  Normal  150-450  

 

 Mean Platelet Volume  6.9 fL  Low  7.4-10.4  

 

 Abs Neutrophils  5.1 10^3/uL  Normal  1.5-7.7  

 

 Abs Lymphocytes  1.0 10^3/uL  Normal  1.0-4.8  

 

 Abs Monocytes  0.8 10^3/uL  Normal  0-0.8  

 

 Abs Eosinophils  0.5 10^3/uL  Normal  0-0.6  

 

 Abs Basophils  0.1 10^3/uL  Normal  0-0.2  

 

 Abs Nucleated RBC  0.0 10^3/uL      

 

 Granulocyte %  67.6 %      

 

 Lymphocyte %  13.6 %      

 

 Monocyte %  10.8 %      

 

 Eosinophil %  7.2 %      

 

 Basophil %  0.8 %      

 

 Nucleated Red Blood Cells %  0.0      









 Laboratory test  2019  [facility]  Creatine  231 U/L  High  10
-223  



 finding    [address]  Kinase(CK)        



     [place], [place] [id number] [phone]          









 LDH  305 U/L  High  140-271  

 

 Erythrocyte Sed Rate  5 mm/Hr  Normal  0-19  

 

 Anti Nuclear Antibody  1.0 U      2

 

 Phospholipid Ab IgA  < 9.4 APL      3









 Cardiolipin  2019  [facility]  Phospholipid Ab  < 9.4 MPL    
  4



 Igg/Igm    [address]  IgM, [place]        



     [place] [address] [phone]          









 Phospholipid Ab IgG  < 9.4 GPL      5









 EKG 12 lead  2019  N2N/CCD Import  Ventricular Rate EKG/Min  48    BPM  









 Atrial Rate  48    BPM  

 

 pr-Interval (Msec)  216 ms      

 

 QRS-Interval (Msec)  174 ms      

 

 QT-Interval (Msec)  522 ms      

 

 QTC  466 ms      

 

 P Axis  -22    degrees  

 

 R Axis  -49    degrees  

 

 T Axis  111    degrees  

 

 Diagnosis Line  Sinus bradycardia with 1st degree Av block      

 

 Diagnosis Line  Left axis deviation      

 

 Diagnosis Line  Left bundle branch block      

 

 Diagnosis Line  See Note      6









 Basic Metabolic Panel  2019  N2N/CCD Import  Sodium  140    135 - 145 mEq
/L  









 Potassium  4.1    3.5 - 5.1 mEq/L  

 

 Chloride  103    98 - 108 mEq/L  

 

 Co2  31    20 - 31 mEq/L  

 

 Anion Gap  6    4 - 16 mEq/L  

 

 BUN  27 mg/dL  High  8 - 20  

 

 Creatinine  0.9 mg/dL    0.7 - 1.2  

 

 Glucose  81 mg/dL    65 - 100  

 

 Calcium  8.8 mg/dL    8.5 - 10.4  









 eGFR   2019  N2N/CCD Import  Egfr   >60    mL/min  

 

 eGFR Black  2019  N2N/CCD Import  Egfr Black  >60    mL/min  

 

 PT/Inr  2019  N2N/CCD Import  Protime  12.2    10.2 - 12.9 sec  









 Inr  1.1    0.9 - 1.1  









 CBC  2019  N2N/CCD Import  WBC  11    4.0 - 11.0 10 3/uL  









 RBC  4.72    4.40 - 6.20 10 6/uL  

 

 HGB  13.7 g/dL    13.0 - 18.0  

 

 HCT  43 %    40 - 52  

 

 MCV  90 fL    80 - 100  

 

 MCH  29 pg    26.0 - 34.0  

 

 MCHC  32.2 g/dL    32.0 - 37.5  

 

 RDW  14.3 %    0.0 - 15.2  

 

 Platelet Count  162    150 - 450 10 3/uL  









 Procalcitonin  2019  N2N/CCD Import  Procalcitonin  <0.05    0.00 -  



             1.99 ng/mL  

 

 Poct Inr  2019  N2N/CCD Import  Inr- Poc  1.0    0.9 - 1.1  

 

 Poc Act Testing  2019  N2N/CCD Import  HR-Act (Poc)  262    seconds  

 

 Transthoracic  2019  N2N/CCD Import  Ef (Lvef)  v    %  



 Echocardiogram              



 (Complete)              

 

 Inr/Protime  2019  [facility]  Inr  1.39  High  0.82-1.09  7, 
8



     [address]          



     [place] [address] [phone]          

 

 Lipid Panel - JFM  2019  [facility]  Creatine  319 U/L  High  
  



     [address]  Kinase(CK)        



     [place], [place] [id number] [phone]          

 

 Comp Metabolic  2019  [facility]  Sodium  139  Normal  135-
145  



 Panel    [address]    mmol/L      



     [place], [place] [id number] [phone]          









 Potassium  4.1 mmol/L  Normal  3.5-5.0  

 

 Chloride  106 mmol/L  Normal  101-111  

 

 Co2 Carbon Dioxide  30 mmol/L  Normal  22-32  

 

 Anion Gap  3 mmol/L  Normal  2-11  

 

 Glucose  87 mg/dL  Normal    

 

 Blood Urea Nitrogen  21 mg/dL  Normal  6-24  

 

 Creatinine  0.83 mg/dL  Normal  0.67-1.17  

 

 BUN/Creatinine Ratio  25.3  High  8-20  

 

 Calcium  9.4 mg/dL  Normal  8.6-10.3  

 

 Total Protein  6.5 g/dL  Normal  6.4-8.9  

 

 Albumin  4.1 g/dL  Normal  3.2-5.2  

 

 Globulin  2.4 g/dL  Normal  2-4  

 

 Albumin/Globulin Ratio  1.7  Normal  1-3  

 

 Total Bilirubin  0.70 mg/dL  Normal  0.2-1.0  

 

 Alkaline Phosphatase  48 U/L  Normal    

 

 Alt  28 U/L  Normal  7-52  

 

 Ast  31 U/L  Normal  13-39  

 

 Egfr Non-  88.1    >60  

 

 Egfr   106.5    >60  9









 Lipid Profile  2019  [facility]  Triglycerides  115 mg/dL    
  10



 (Trig/Chol/HDL)    [address]          



     [place], [place] [id number] [phone]          









 Cholesterol  121 mg/dL      11

 

 HDL Cholesterol  45.1 mg/dL      12

 

 LDL Cholesterol  53 mg/dL      13









 CBC Auto  2019  [facility]  White Blood  8.0 10^3/uL  Normal  
3.5-10.8  



 Diff    [address]  Count        



     [place] [address] [phone]          









 Red Blood Count  4.88 10^6/uL  Normal  4.18-5.48  

 

 Hemoglobin  14.4 g/dL  Normal  14.0-18.0  

 

 Hematocrit  43 %  Normal  42-52  

 

 Mean Corpuscular Volume  88 fL  Normal  80-94  

 

 Mean Corpuscular Hemoglobin  30 pg  Normal  27-31  

 

 Mean Corpuscular HGB Conc  34 g/dL  Normal  31-36  

 

 Red Cell Distribution Width  15 %  Normal  10.5-15  

 

 Platelet Count  190 10^3/uL  Normal  150-450  

 

 Mean Platelet Volume  7.9 fL  Normal  7.4-10.4  

 

 Abs Neutrophils  5.3 10^3/uL  Normal  1.5-7.7  

 

 Abs Lymphocytes  1.3 10^3/uL  Normal  1.0-4.8  

 

 Abs Monocytes  0.9 10^3/uL  High  0-0.8  

 

 Abs Eosinophils  0.5 10^3/uL  Normal  0-0.6  

 

 Abs Basophils  0.0 10^3/uL  Normal  0-0.2  

 

 Abs Nucleated RBC  0.0 10^3/uL      

 

 Granulocyte %  66.5 %      

 

 Lymphocyte %  16.1 %      

 

 Monocyte %  11.1 %      

 

 Eosinophil %  5.9 %      

 

 Basophil %  0.4 %      

 

 Nucleated Red Blood Cells %  0.1      









 Inr/Protime  2019  [facility]  Inr  1.61  High  0.82-1.09  14



     [address]          



     [place] [address] [phone]          

 

 Laboratory test  2019  [facility]  Activated  43.3  High  26.0
-38.0  



 finding    [address]  Partial  seconds      



     [place] [address]  Thrombo Time        



     [phone]          









 1  *******Because ethnic data is not always readily available,



   this report includes an eGFR for both -Americans and



   non- Americans.****



   The National Kidney Disease Education Program (NKDEP) does



   not endorse the use of the MDRD equation for patients that



   are not between the ages of 18 and 70, are pregnant, have



   extremes of body size, muscle mass, or nutritional status,



   or are non- or non-.



   According to the National Kidney Foundation, irrespective of



   diagnosis, the stage of the disease is based on the level of



   kidney function:



   Stage Description                      GFR(mL/min/1.73 m(2))



   1     Kidney damage with normal or decreased GFR       90



   2     Kidney damage with mild decrease in GFR          60-89



   3     Moderate decrease in GFR                         30-59



   4     Severe decrease in GFR                           15-29



   5     Kidney failure                       <15 (or dialysis)

 

 2  -------------------REFERENCE VALUE--------------------------



   <=1.0 (Negative)



   Test Performed by:



   [facility] [place] [address]

 

 3  -------------------REFERENCE VALUE--------------------------



   <15.0 (Negative)



   Test Performed by:



   [facility] [place] [address]

 

 4  -------------------REFERENCE VALUE--------------------------



   <15.0 (Negative)

 

 5  -------------------REFERENCE VALUE--------------------------



   <15.0 (Negative)



   Test Performed by:



   [facility] [place] [address]

 

 6  Confirmed by Payton Gallegos ([id number]) on 2019 11:01:32 Am

 

 7  CALL RESTULS TO EXT [id number]

 

 8  Standard intensity warfarin therapeutic range: 2.0-3.0



   High intensity warfarin therapeutic range: 2.5-3.5

 

 9  *******Because ethnic data is not always readily available,



   this report includes an eGFR for both -Americans and



   non- Americans.****



   The National Kidney Disease Education Program (NKDEP) does



   not endorse the use of the MDRD equation for patients that



   are not between the ages of 18 and 70, are pregnant, have



   extremes of body size, muscle mass, or nutritional status,



   or are non- or non-.



   According to the National Kidney Foundation, irrespective of



   diagnosis, the stage of the disease is based on the level of



   kidney function:



   Stage Description                      GFR(mL/min/1.73 m(2))



   1     Kidney damage with normal or decreased GFR       90



   2     Kidney damage with mild decrease in GFR          60-89



   3     Moderate decrease in GFR                         30-59



   4     Severe decrease in GFR                           15-29



   5     Kidney failure                       <15 (or dialysis)

 

 10  Desirable: <150



   Borderline High: 150-199



   High: 200-499



   Very High: >500

 

 11  Desirable: <200



   Borderline High: 200-239



   High: >239

 

 12  Low: <40



   Desirable: 40-60



   High: >60

 

 13  Desirable: <100



   Near Optimal: 100-129



   Borderline High: 130-159



   High: 160-189



   Very High: >189

 

 14  Standard intensity warfarin therapeutic range: 2.0-3.0



   High intensity warfarin therapeutic range: 2.5-3.5







Procedures







 Date  Code  Description  Status

 

 2019  57505  Interrogation Device Eval Remote Up To 30 Days   Completed



     Analysis,Rev,RP  

 

 2019  72118  Interrogation Device Eval Remote Up To 30 Days   Completed



     Analysis,Rev,RP  

 

 2019  77003  Icd Eval Sing,Dual,Multi Lead Remote Recpt Transm Tech Rev  
Completed



     Tech S  

 

 2019  37939  Icd Eval Sing,Dual,Multi Lead Remote Recpt Transm Tech Rev  
Completed



     Tech S  

 

 2019  85777  Icd Check Remote Up To 90 Days Single,Dual,Multiple Lead  
Completed

 

 2019  76281  Icd Check Remote Up To 90 Days Single,Dual,Multiple Lead  
Completed

 

 2019  16889  Icd Eval With Inerative Adjustmt Dual Lead System  Completed

 

 2019  35152  Icd Eval With Inerative Adjustmt Dual Lead System  Completed

 

 09/10/2019  43982  Icd Eval With Inerative Adjustmt Dual Lead System  Completed

 

 09/10/2019  08513  Icd Eval With Inerative Adjustmt Dual Lead System  Completed

 

 2019  96220  Icd Eval With Inerative Adjustmt Dual Lead System  Completed

 

 2019  25143  Insert/Replace Icd W/Generator  Completed

 

 2019  08005  Implantable Cardio System Loop Recorder Sys Remota Data  
Completed



     Acquistio  

 

 2019  28656  Interrogation Dev Loop Recorder Incl Physician  Completed



     Analysis,Rev,Repor  

 

 2019  31047  EKG Tracing & Interpretation  Completed

 

 2019  14230  ECHO Transthoracic, Real-Time 2D With Doppler And Color  
Completed



     Flow  

 

 2019  54875  ECHO Transthoracic, Real-Time 2D With Doppler And Color  
Completed



     Flow  

 

 2019  99885  EKG Tracing & Interpretation  Completed

 

 2019  11945  Implantable Cardio System Loop Recorder Sys Remota Data  
Completed



     Acquistio  

 

 2019  10937  Interrogation Dev Loop Recorder Incl Physician  Completed



     Analysis,Rev,Repor  

 

 2019  44150  ECHO Transthorasic Realtime 2D W Doppler & Color Flow Hosp  
Completed

 

 2019  35681  Implantable Cardio System Loop Recorder Sys Remota Data  
Completed



     Acquistio  

 

 2019  47528  Interrogation Dev Loop Recorder Incl Physician  Completed



     Analysis,Rev,Repor  

 

 2019  07152  Coronary Angiography With Catheter Placement In [facility]  
Completed



     Grafts  

 

 2019  45711  Moderate Sedation Services; Same Phys Intl 15 Mins; PT >=  
Completed



     5 Years  

 

 2019  35567  Color Flow Doppler/Interp & Reprt  Completed

 

 2019  40344  Pulse Wave/Continuous-Interp.RPT  Completed

 

 2019  09413  Echocardiography, Transesophageal, Real Time W/Image 2D  
Completed



     W/W/O M-M  







[employer]







 Description

 

 No Information Available







Encounters







 Type  Date  Location  Provider  Dx  Diagnosis

 

 Office Visit  2019  [facility]  Alan Pichardo NP  Z79.899  Other 
long term



   2:00p  [facility]      (current) drug



           therapy









 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

 

 R51  Headache









 Office Visit  09/10/2019 12:15p  [facility]  Jaquelin Prado,  I35.0  
Nonrheumatic



     [facility]  M.DTodd    aortic (valve)



           stenosis









 Z95.2  Presence of prosthetic heart valve

 

 I44.7  Left bundle-branch block, unspecified

 

 Z95.810  Presence of automatic (implantable) cardiac defibrillator

 

 I47.2  Ventricular tachycardia

 

 I25.10  Athscl heart disease of native coronary artery w/o ang pctrs









 Office Visit  2019 12:00p  [facility]  Aixa S.  I47.2  Ventricular



     Of Cma  Foster, N.P.    tachycardia









 I25.10  Athscl heart disease of native coronary artery w/o ang pctrs

 

 Z95.2  Presence of prosthetic heart valve

 

 I48.0  Paroxysmal atrial fibrillation

 

 I44.7  Left bundle-branch block, unspecified

 

 I35.0  Nonrheumatic aortic (valve) stenosis

 

 Z95.810  Presence of automatic (implantable) cardiac defibrillator









 Office Visit  2019  3:15p  [facility]  Renato MADDOX  I25.10  Athscl 
heart



     Of Dina Antony M.D.    disease of



           native coronary



           artery w/o ang



           pctrs









 Z95.2  Presence of prosthetic heart valve

 

 I47.2  Ventricular tachycardia

 

 I48.0  Paroxysmal atrial fibrillation

 

 I44.7  Left bundle-branch block, unspecified









 Office Visit  2019  Neurohospitalist  Alan Pichardo,  Z79.899  Other long



   3:30p  Clinic  NP    term (current)



           drug therapy









 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

 

 R51  Headache

 

 R42  Dizziness and giddiness









 Office Visit  2019 11:00a  [facility]  Aixa S.  I25.10  Athscl 
heart



       Foster, N.P.    disease of



           native coronary



           artery w/o ang



           pctrs









 Z95.1  Presence of aortocoronary bypass graft

 

 I35.0  Nonrheumatic aortic (valve) stenosis

 

 E78.2  Mixed hyperlipidemia

 

 I10  Essential (primary) hypertension

 

 Z95.2  Presence of prosthetic heart valve









 Office Visit  2019  Neurohospitalist  Branden VELZI  I63.40  Cerebral



   7:00a  Teresita Turner M.D.    infarction due



           to embolism of



           unsp cerebral



           artery

 

 Office Visit  2019  [facility]  Alisson Okeefe MD  R47.81  Slurred speech



   8:56a  [facility]      









 R20.0  Anesthesia of skin

 

 I50.30  Unspecified diastolic (congestive) heart failure

 

 H81.09  Meniere's disease, unspecified ear









 Office Visit  2019  Neurohospitalist  Branden VELIZ  R20.0  Anesthesia of



   7:00a  Teresita Turner M.D.    skin









 D68.61  Antiphospholipid syndrome









 Office Visit  2019  8:56a  [facility]  Keyon Bowen MD  R47.81  
Slurred speech



     Assoc,      



     Hospitalists      









 R20.0  Anesthesia of skin

 

 I50.30  Unspecified diastolic (congestive) heart failure

 

 D68.61  Antiphospholipid syndrome









 Office Visit  2019  8:56a  [facility] Jacqueline Bowen MD  R47.81  
Slurred speech



     Assoc,      



     Hospitalists      









 R20.0  Anesthesia of skin

 

 R47.1  Dysarthria and anarthria

 

 R27.8  Other lack of coordination









 Office Visit  2019  1:00p  [facility]  Aixa VELIZ  I25.10  Athscl 
heart



     Of Dina Yuan N.P.    disease of



           native coronary



           artery w/o ang



           pctrs









 Z95.1  Presence of aortocoronary bypass graft

 

 I35.0  Nonrheumatic aortic (valve) stenosis

 

 E78.2  Mixed hyperlipidemia

 

 I10  Essential (primary) hypertension

 

 R94.39  Abnormal result of other cardiovascular function study

 

 Z95.818  Presence of other cardiac implants and grafts







Assessments







 Date  Code  Description  Provider

 

 2019  I69.398  Other sequelae of cerebral infarction  Alan Pichardo, TASH

 

 2019  Z79.899  Other long term (current) drug therapy  Alan Pichardo NP

 

 2019  Z95.810  Presence of automatic (implantable)  Jaquelin Prado M.D.



     cardiac defibrillator  

 

 2019  Z95.810  Presence of automatic (implantable)  Remote Device Checks



     cardiac defibrillator  

 

 2019  I47.2  Ventricular tachycardia  Jaquelin Prado M.D.

 

 2019  I47.2  Ventricular tachycardia  Remote Device Checks

 

 2019  I44.0  Atrioventricular block, first degree  Jaquelin Prado M.D.

 

 2019  I44.0  Atrioventricular block, first degree  Remote Device Checks

 

 2019  Z95.810  Presence of automatic (implantable)  Jaquelin Prado M.D.



     cardiac defibrillator  

 

 2019  Z95.810  Presence of automatic (implantable)  Ica Pacer Schedule



     cardiac defibrillator  

 

 2019  I47.2  Ventricular tachycardia  Jaquelin Prado M.D.

 

 2019  I47.2  Ventricular tachycardia  Ica Pacer Schedule

 

 2019  Z79.899  Other long term (current) drug therapy  Alan Pichardo, NP

 

 2019  I69.398  Other sequelae of cerebral infarction  Alan Pichardo, NP

 

 2019  R51  Headache  Alan Pichardo, NP

 

 09/10/2019  I44.7  Left bundle-branch block, unspecified  Ica Pacer Schedule

 

 09/10/2019  Z95.810  Presence of automatic (implantable)  Jaquelin Prado M.D.



     cardiac defibrillator  

 

 09/10/2019  I35.0  Nonrheumatic aortic (valve) stenosis  Jaquelin Prado M.D.

 

 09/10/2019  Z95.810  Presence of automatic (implantable)  Ica Pacer Schedule



     cardiac defibrillator  

 

 09/10/2019  Z95.2  Presence of prosthetic heart valve  Jaquelin Prado M.D.

 

 09/10/2019  I47.2  Ventricular tachycardia  Ica Pacer Schedule

 

 09/10/2019  I44.7  Left bundle-branch block, unspecified  Jaquelin Prado M.D.

 

 09/10/2019  Z95.810  Presence of automatic (implantable)  Jaquelin Prado M.D.



     cardiac defibrillator  

 

 09/10/2019  I47.2  Ventricular tachycardia  Jaquelin Prado M.D.

 

 09/10/2019  I25.10  Atherosclerotic heart disease of  Jaquelin Prado M.D.



     native coronary artery with  

 

 2019  I47.2  Ventricular tachycardia  Aixa Yuan, N.P.

 

 2019  I25.10  Atherosclerotic heart disease of  Aixa Yuan, N.PTodd



     native coronary artery with  

 

 2019  Z95.2  Presence of prosthetic heart valve  Aixa S. Kwabena, N.P.

 

 2019  I48.0  Paroxysmal atrial fibrillation  Aixa S. Kwabena, N.P.

 

 2019  I44.7  Left bundle-branch block, unspecified  Aixa S. Kwabena, N.P.

 

 2019  I35.0  Nonrheumatic aortic (valve) stenosis  Aixa STodd Yuan, N.P.

 

 2019  Z95.810  Presence of automatic (implantable)  Aixa Yuan N.PTodd



     cardiac defibrillator  

 

 2019  Z95.810  Presence of automatic (implantable)  Renato Antony M.D.



     cardiac defibrillator  

 

 2019  I47.2  Ventricular tachycardia  Renato Antony M.D.

 

 2019  G45.9  Transient cerebral ischemic attack,  Jaquelin Prado M.D.



     unspecified  

 

 2019  I25.810  Atherosclerosis of coronary artery  Jaquelin Prado M.D.



     bypass graft(s) without a  

 

 2019  Z95.818  Presence of other cardiac implants and  Jaquelin Prado M.D.



     grafts  

 

 2019  I25.10  Atherosclerotic heart disease of  Renato Antony M.D.



     native coronary artery with  

 

 2019  Z95.2  Presence of prosthetic heart valve  Renato Antony M.D.

 

 2019  I47.2  Ventricular tachycardia  Renato Antony M.D.

 

 2019  I48.0  Paroxysmal atrial fibrillation  Renato Antony M.D.

 

 2019  I44.7  Left bundle-branch block, unspecified  Renato Antony M.D.

 

 2019  Z95.2  Presence of prosthetic heart valve  Jaquelin Prado M.D.

 

 2019  Z95.2  Presence of prosthetic heart valve  Ica ECHO Schedule

 

 2019  Z79.899  Other long term (current) drug therapy  Alan Pichardo, TASH

 

 2019  I69.398  Other sequelae of cerebral infarction  Alan Pichardo, TASH

 

 2019  R51  Headache  Alan Pichardo, NP

 

 2019  R42  Dizziness and giddiness  Alan Pichardo, NP

 

 2019  R94.31  Abnormal electrocardiogram [ECG] [EKG]  Jaquelin Prado M.D.

 

 2019  I25.10  Atherosclerotic heart disease of  Aixa Yuan N.PTodd



     native coronary artery with  

 

 2019  Z95.1  Presence of aortocoronary bypass graft  Aixa Yuan N.P.

 

 2019  I35.0  Nonrheumatic aortic (valve) stenosis  Aixa Yuan N.P.

 

 2019  E78.2  Mixed hyperlipidemia  Aixa Yuan N.P.

 

 2019  I10  Essential (primary) hypertension  Aixa Yuan, N.P.

 

 2019  Z95.2  Presence of prosthetic heart valve  Aixa Yuan, N.P.

 

 2019  G45.9  Transient cerebral ischemic attack,  Jaquelin Prado M.D.



     unspecified  

 

 2019  I25.810  Atherosclerosis of coronary artery  Jaquelin Prado M.D.



     bypass graft(s) without a  

 

 2019  Z95.818  Presence of other cardiac implants and  Jaquelin Prado M.D.



     grafts  

 

 2019  I63.40  Cerebral infarction due to embolism of  Branden Turner M.D.



     unspecified cerebral artery  

 

 2019  I63.9  Cerebral infarction, unspecified  Renato Antony M.D.

 

 2019  R47.81  Slurred speech  Alisson Okeefe MD

 

 2019  R20.0  Anesthesia of skin  Alisson Okeefe MD

 

 2019  I50.30  Unspecified diastolic (congestive)  Alisson Okeefe MD



     heart failure  

 

 2019  H81.09  Meniere's disease, unspecified ear  Alisson Okeefe MD

 

 2019  R20.0  Anesthesia of skin  Branden Turner M.D.

 

 2019  R47.81  Slurred speech  Keyon Bowen MD

 

 2019  D68.61  Antiphospholipid syndrome  Branden Turner M.D.

 

 2019  R20.0  Anesthesia of skin  Keyon Bowen MD

 

 2019  I50.30  Unspecified diastolic (congestive)  Keyon Bowen MD



     heart failure  

 

 2019  D68.61  Antiphospholipid syndrome  Keyon Bowen MD

 

 2019  R47.81  Slurred speech  Keyon Bowen MD

 

 2019  R20.0  Anesthesia of skin  Keyon Bowen MD

 

 2019  R47.1  Dysarthria and anarthria  Keyon Bowen MD

 

 2019  R27.8  Other lack of coordination  Keyon Bowen MD

 

 2019  I25.10  Atherosclerotic heart disease of  Aixa STodd Yuan, N.P.



     native coronary artery with  

 

 2019  Z95.1  Presence of aortocoronary bypass graft  Aixa PATRICK. Kwabena, N.P.

 

 2019  I35.0  Nonrheumatic aortic (valve) stenosis  Aixa Yuan, N.P.

 

 2019  E78.2  Mixed hyperlipidemia  Aixa Yuan, N.P.

 

 2019  I10  Essential (primary) hypertension  Aixa Yuan, N.P.

 

 2019  R94.39  Abnormal result of other  Aixa S. Kwabena, N.P.



     cardiovascular function study  

 

 2019  Z95.818  Presence of other cardiac implants and  Aixa S. Kwabena, 
N.P.



     grafts  

 

 2019  G45.9  Transient cerebral ischemic attack,  Jaquelin Prado M.D.



     unspecified  

 

 2019  I25.810  Atherosclerosis of coronary artery  Jaquelin Prado M.D.



     bypass graft(s) without a  

 

 2019  Z95.818  Presence of other cardiac implants and  Jaquelin Prado M.D.



     grafts  

 

 2019  I25.10  Atherosclerotic heart disease of  Satish Patterson M.D., [facility],



     [facility] coronary artery [facility]  [facility]

 

 2019  Z95.1  Presence of aortocoronary bypass graft  Satish Patterson M.D., 
[facility],



       [facility]

 

 2019  I35.0  Nonrheumatic aortic (valve) stenosis  Jaquelin Prado M.D.







Plan of Treatment

2019 - Alan Pichardo, NPI69.398 Other sequelae of cerebral infarctionFollow 
up:PRNZ79.899 Other long term (current) drug therapy



Functional Status







 Description

 

 No Information Available







Mental Status







 Description

 

 No Information Available







Referrals







 Description

 

 No Information Available

## 2019-12-04 NOTE — CONS
CC:  Dr. Sadler; Dr. Branden Turner *

 

CONSULTATION REPORT:

 

DATE OF ER VISIT:  12/04/19

 

DATE OF CONSULT:  12/04/19

 

CURRENT LOCATION:  ED, bed 7.

 

PRIMARY CARE PROVIDER:  Dr. Sadler.

 

NEUROLOGIST:  Dr. Branden Turner.

 

REASON FOR CONSULT:  Acute onset of right face and arm tingling.

 

HISTORY OF PRESENT ILLNESS:    

Mr. Campbell is here today with pareshtesias of the right hand and mouth.  He 
was admitted to the hospital back in late June of 2019.  At that time, he was 
seen by Dr. Turner on 06/30/19.  He had previously also been seen by Dr. Jermain Curtis in 2018 for some double vision.  In June, he presented with a transient 
ischemic attack, subtherapeutic on his INR.  The patient does have a history of 
antiphospholipid syndrome and is on chronic Coumadin.  He follows with Dr. Wan 
for this.  At the time he was admitted in June, he came in with some right hand 
numbness that lasted about an hour and then resolved.  He had also had his 
transaortic valve replaced a few days prior and had been off of his 
anticoagulation for that procedure.  At the time he presented, his INR was 1.2.
  There was some concern about drooping of his right nasolabial fold with some 
dysarthria and slight asymmetry on finger-to-nose testing.  He had a stroke 
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 
resolution of his symptoms.  It was recommended that he be put on Lovenox 
bridge and restarted on his Coumadin, which he did and has been doing well 
since.  He did have a defibrillator placed back in August of this year and did 
well.  This morning, he was in his usual state of health when he noticed acute 
onset of some right hand and digits 1, 2 and 3 and some right perioral 
tingling.  This lasted for about 20 minutes and resolved, but given his prior 
findings, he came to the ER for further evaluation.  He states that the 
symptoms completely resolved, although he does not "feel completely back to 
baseline."  He describes a "fullness in his head."  He denies any other 
symptoms including focal weakness, any numbness or tingling on the left side.  
No vision changes.  No speech difficulties or slurred speech.  No swallowing 
difficulties.  He denies any palpitations or chest pain.  No shortness of 
breath or dyspnea on exertion.  He denies any abdominal pain, fevers, chills, 
nausea, vomiting.  Has otherwise been in his  usual state of health.  He has 
been symptom-free now for several hours.  He states that he is very consistent 
with his medications and his INR on presentation today was 2.34.  He denies any 
falls or head trauma.  His daughter at the beside notes that his face does seem 
to be a little bit asymmetric with some droop of the right lower face.  The 
patient was unaware of this, but she states that this is what happened the last 
time he had his stroke.  He was last seen by Alan Pichardo NP, on 11/26/19 in 
clinic for followup on his cerebral infarction.  At the time of his last clinic 
visit, he complained of some flashing lights in his eyes occasionally.  He went 
to see Dr. Baker.  He was also noted to be in cardiac rehab and it was 
suggested that he continue that. Otherwise, he has not had any major neurologic 
issues.  He is being admitted for possible TIA.

 

PAST MEDICAL HISTORY:  Complicated and includes history of aortic valve 
replacement; coronary artery disease and MI, status post CABG in 2004; status 
post TAVR; carotid artery stenosis; peripheral vascular disease; hyperlipidemia
; hypertension; antiphospholipid syndrome; multiple TIAs; Meniere's disease.

 

PAST SURGICAL HISTORY:  As noted above includes CABG, TAVR, right hip 
replacement in 2012, appendectomy, rotator cuff repairs which limits his 
movement.

 

CURRENT MEDICATIONS:  Include:

1.  Magnesium.

2.  Naproxen.

3.  CBD extract.

4.  Meclizine as needed.

5.  Calcium carbonate.

6.  Vitamin B complex.

7.  Ferrous sulfate.

8.  Warfarin 5 mg daily.

9.  Melatonin.

10.  Glucosamine.

11.  Multivitamin.

12.  Gabapentin 300 mg t.i.d.

13.  Biotin.

14.  Polyethylene glycol.

15.  Colestipol.

16.  Temazepam 7.5 mg at bedtime.

17.  Omeprazole.

18.  Omega-3 fatty acids.

19.  Lisinopril 2.5 mg daily.

20.  Furosemide 20 to 40 mg daily.

21.  Synthroid 100 mcg daily.

22.  Amoxicillin.

 

ALLERGIES:  To IV CONTRAST with severe headache lasting for days; STATINS, 
muscle aches; NIACIN, flushing; BETA-BLOCKERS, bradycardia; ZETIA, muscle 
aches.  His daughter notes that he has received contrast without difficulty.

 

FAMILY HISTORY:  Significant for a father with a ruptured colon in his 70s and 
mother with colon cancer at 72.

 

SOCIAL HISTORY:  He drinks alcohol very rarely.  No drug use.  No tobacco use.  
He is a retired .  His daughter, Kimberly Rausch, is his surrogate.  He 
recently lost his wife to Alzheimer's.

 

REVIEW OF SYSTEMS:  Review of systems in 14-organ systems as noted above.

 

PHYSICAL EXAM:  

Temperature of 98.3, heart rate of 69, respiratory rate of 16, O2 sat of 93% to 
96% on room air, blood pressure 150/89 to 155/75.  



In general, he is a well-nourished, well-developed gentleman.  He is 
ambidextrous, in no acute distress, lying in his hospital bed.  His daughter is 
at the bedside.  He is very pleasant, well dressed, well groomed.  HEENT:  He 
is normocephalic, atraumatic. His sclerae are anicteric.  His mucous membranes 
are moist.  His oropharynx is clear.  His nares are patent.  Neck is supple.  
No thyromegaly.  No carotid bruits. No meningismus.  Chest:  Clear to 
auscultation bilaterally.  Cardiovascular is regular rate and rhythm with no 
murmurs.  Abdomen is nontender, nondistended. Extremities:  No clubbing, 
cyanosis, or edema.  



On neurologic exam, he is awake, alert, and oriented x3.  His speech is fluent.
  There is some mild dysarthria. Recall is intact.  Mood is euthymic.  Cranial 
Nerves:  Pupils are equally round and reactive to light and accommodation.  
Extraocular muscles are intact with no nystagmus, no diplopia.  There is no 
ptosis noted.  Visual fields are full to confrontation.  He has some right 
lower facial weakness that is subtle and mild in nature.  Facial sensation is 
intact to light touch.  Palate raises symmetrically. Tongue is midline.  
Hearing is slightly diminished bilaterally.  Motor Exam:  He spontaneously 
moves all extremities antigravity with good strength and good tone. No atrophy 
and no drift.  His sensation is to light touch and pinprick has now resolved.  
He has no deficits in the right or left arm.  No asymmetry in the lower 
extremities.  Finger-to-nose and rapid alternating movements are intact.  There 
is no resting tremor.  There is no intention tremor.  Reflexes are symmetric at 
the biceps, brachioradialis, and patella; absent at the ankles; plantars are 
equivocal. Gait:  He is able to stand.  It is wide based, but steady.  Romberg, 
minimal sway with eyes closed.

 

DIAGNOSTIC STUDIES/LAB DATA:  Lab work includes an INR of 2.34, PTT of 46.4.  
CBC with diff that was essentially normal.  Complete metabolic profile with a 
BUN of 27, BUN/creatinine ratio of 26.7, lactic acid 0.7.  Triglycerides 147, 
cholesterol 128, LDL 56, HDL 42.8.  Prior TSH from 06/29/19 was 15.84, free T4 
was 0.96, and total T3 was 65.

 

He did have a CT scan done today, reviewed.  No intracranial abnormalities.  He 
does have some significant chronic small vessel ischemic changes and some 
atrophy.

 

ASSESSMENT:  Mr. Campbell is an 86-year-old gentleman with a history of 
multiple medical problems including history of transcatheter aortic valve 
replacement; history of antiphospholipid syndrome, on Coumadin; coronary artery 
disease with bypass in the past; carotid artery stenosis; peripheral vascular 
disease; hyperlipidemia, who was admitted to the hospital back in late June, 
early July of 2019 with stroke secondary to being off of his Coumadin with a 
low INR.  He was bridged with Lovenox and put back on Coumadin and has been 
doing fine, had a defibrillator placed in August, since that time as well.  
This morning woke up with some numbness in his digits 1, 2 and 3 on the right 
as well as some perioral numbness on the right, which lasted about 20 minutes 
and then resolved. Currently, he is back to his baseline, although he does 
appear to have some mild right lower facial droop that is persistent concerning 
for a new stroke. Unfortunately, he cannot have an MRI due to his 
defibrillator.  



PLAN

1.  Admit to the hospital

2.  Get a CT angiogram of the head and neck assuming he can tolerate the IV dye.

3.  We will recheck an echocardiogram given his ongoing heart issues.

4.  Continue his Coumadin and I will add a baby aspirin as well for further 
stroke prevention.

5.  His HDL and LDL cholesterol are good.

6.  Control his blood pressure.

7.  Monitor on telemetry.

 

I will make further recommendations tomorrow when I see him.

 

Thank you for the opportunity to participate in the care of this very 
interesting patient.

 

 [id number]/CPS #: [id number]

DELMIS

## 2019-12-04 NOTE — ED
Neurological HPI





- HPI Summary


HPI Summary: 


Time seen by provider: 1228.





The patient is an 85 y/o M arriving by ambulance to [facility] with a chief 

complaint of numbness in the right side of the face and right hand onset around 

1130. He reports that the numbness in the hands and face completely resolved 

after about 30 minutes, but he was concerned because he has a history of 

multiple TIAs occurring after an aortic valve replacement this past summer. He 

denies any fevers, chills, chest pain, or shortness of breath. EMS note that he 

had a slight right facial droop that is no longer observable, and he was able 

to ambulate with a steady gait. He is currently on Warfarin. He is not in any 

pain. PMHx: thyroid disease, angina, defibrillator, cardiac arrest, CAD, HLD, 

HTN, MI, peripheral vascular disease, valvular heart disease, cardiac stents, 

CABG, aortic valve replacement, asthma, PNA, GERD, TIA. Nonsmoker, no EtOH, no 

substance use. Medications reviewed. Allergies noted.





- History of Current Complaint


Stated Complaint: POSS TIA PER EMS


Time Seen by Provider: 12/04/19 12:28


Hx Obtained From: Patient, EMS


Onset/Duration: Sudden Onset, Started hours ago - 1130, Resolved


Onset Severity: Moderate


Current Severity: None


Neurological Deficit Location: Facial - right, RUE - hand


Pain Intensity: 0


Pain Scale Used: 0-10 Numeric


Character: Numbness/Tingling - right hand, right side of face, Other: -  right 

facial droop


Aggravating: Nothing


Alleviating: Spontanious Resolution


Associated Signs and Symptoms: Positive: Numbness - right face, right hand (all 

resovled).  Negative: Unsteady Gait, Pain, Fever, Chest Pain, Shortness of 

Breath


TPA Considered: No - symptoms have resolved





- Additional Pertinent History


Primary Care Physician: [id number]





- Allergy/Home Medications


Allergies/Adverse Reactions: 


 Allergies











Allergy/AdvReac Type Severity Reaction Status Date / Time


 


ezetimibe [From Zetia] Allergy  Muscle Ache Verified 06/29/19 00:18


 


Beta-Blockers AdvReac  See Comment Verified 06/29/19 00:18





(Beta-Adrenergic Bloc     


 


niacin AdvReac  Flushing Verified 06/29/19 00:18


 


simvastatin [From Zocor] AdvReac  See Comment Verified 08/09/19 15:52


 


Statins-Hmg-Coa Reductase AdvReac  Muscle Ache Verified 06/29/19 00:18





Inhibitor     











Home Medications: 


 Home Medications





Ascorbic Acid TAB* [Vitamin C  TAB*] 2,000 mg PO BID 12/04/19 [History 

Confirmed 12/04/19]


Cannabidiol (CBD) Extract (NF) [Epidiolex (NF)] 20 mg PO QAM 12/04/19 [History 

Confirmed 12/04/19]


Cholecalciferol CAP/TAB(NF) [Vitamin D3 CAP/TAB (NF)] 5,000 unit PO DAILY 12/04/ 19 [History Confirmed 12/04/19]


Enoxaparin(*) [Lovenox(*)] 80 mg SUBCUT Q12H PRN 12/04/19 [History Confirmed 12/ 04/19]


Warfarin Sodium 5 mg PO SEE INSTRUCTIONS 12/04/19 [History Confirmed 12/04/19]











PMH/Surg Hx/FS Hx/Imm Hx


Endocrine/Hematology History: Reports: Hx Anticoagulant Therapy, Hx Thyroid 

Disease - hypothyroidism


   Denies: Hx Diabetes


   Comment Only: Other Endocrine/Hematological Disorders - hypothyroidism


Cardiovascular History: Reports: Hx Angina, Hx Auto Implanted Cardiovert Defib 

- inserted 8/12/19, Hx Cardiac Arrest, Hx Coronary Artery Disease, Hx Embolism, 

Hx Hypercholesterolemia, Hx Hypertension, Hx Myocardial Infarction, Hx 

Peripheral Vascular Disease, Hx Valvular Heart Disease - aortic stenosis, Other 

Cardiovascular Problems/Disorders - CAD, moderate aortic stenosis, anti-

phospholipid anitbody syndrome


   Denies: Hx Pacemaker/ICD


Respiratory History: Reports: Hx Asthma - as teen, none now, Hx Pneumonia, Hx 

Seasonal Allergies


   Denies: Hx Chronic Obstructive Pulmonary Disease (COPD)


GI History: Reports: Hx Gastroesophageal Reflux Disease, Other GI Disorders - 

hx ischemic colitis, no surgery


 History: 


   Denies: Hx Chronic Renal Failure, Hx Dialysis


Musculoskeletal History: Reports: Hx Arthritis, Hx Back Problems, Hx Orthopedic 

Injury, Hx Osteoporosis, Other Musculoskeletal History - avascular necrosis of 

left femoral head, spinal stenosis;


Sensory History: Reports: Hx Cataracts - repair both eyes, Hx Contacts or 

Glasses, Hx Vision Problem


   Denies: Hx Eye Injury, Hx Eye Prosthesis, Hx Glaucoma, Hx Macular 

Degeneration, Hx Deafness, Hx Hearing Aid, Hx Hearing Problem


Opthamlomology History: Reports: Hx Cataracts - repair both eyes, Hx Contacts 

or Glasses, Hx Vision Problem


   Denies: Hx Eye Injury, Hx Eye Prosthesis, Hx Glaucoma, Hx Macular 

Degeneration


Neurological History: Reports: Hx Transient Ischemic Attacks (TIA)


   Denies: Hx CVA, Hx Dementia, Hx Seizures


   Comment Only: Other Neuro Impairments/Disorders - multiple cardiac embolic 

strokes per Pt.


Psychiatric History: 


   Denies: Hx Panic Disorder





- Cancer History


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 

in 1989





- Surgical History


Surgical History: Yes


Surgery Procedure, Year, and Place: bilateral rotator cuff repairs, right total 

hip replacement 2012,.  cardiac stents x2, 1999, 2-vessel CABG.  bilateral 

carpal tunnel releases and trigger finger release.  open appendectomy, cataract 

surgery.  umbilical hernia repair.  AORTIC VALVE REPLACED.  LOOP RECORDER


Hx Anesthesia Reactions: No





- Immunization History


Date of Tetanus Vaccine: unk


Date of Influenza Vaccine: unk


Infectious Disease History: 


   Denies: Hx Clostridium Difficile, Hx Hepatitis, Hx Human Immunodeficiency 

Virus (HIV), Hx of Known/Suspected MRSA, Hx Shingles, Hx Tuberculosis, History 

Other Infectious Disease





- Family History


Known Family History: Positive: Other - colon cancer, and perforated bowel.


   Negative: Hypertension, Diabetes





- Social History


Alcohol Use: None


Alcohol Amount: 1/2 glass wine nightly


Hx Substance Use: No


Substance Use Type: Reports: None


Hx Tobacco Use: No


Smoking Status (MU): Never Smoked Tobacco





Review of Systems


Negative: Fever, Chills


Negative: Chest Pain


Negative: Shortness Of Breath


Neurological: Other - right-sided facial droop (per EMS, resolved); Negative: 

unsteady gait


Positive: Numbness - the right hand and right side of face (resolved)


All Other Systems Reviewed And Are Negative: Yes





Physical Exam





- Summary


Physical Exam Summary: 


VITAL SIGNS: Reviewed. 


GENERAL: Patient is a well-developed and nourished male who is lying 

comfortable in the stretcher. Patient is not in any acute respiratory distress. 


HEAD AND FACE: No signs of trauma. No ecchymosis, hematomas or skull 

depressions. No sinus tenderness. 


EYES: PERRLA, EOMI x 2, No injected conjunctiva, no nystagmus. No photophobia.


EARS: Hearing grossly intact. Ear canals and tympanic membranes are within 

normal limits. 


MOUTH: Oropharynx within normal limits. 


NECK: Supple, trachea is midline, no adenopathy, no JVD, no carotid bruit, no c-

spine tenderness, neck with full ROM. No meningeal signs, no Kernig's or 

brudzinskis signs. 


CHEST: Symmetric, no tenderness at palpation. 


LUNGS: Clear to auscultation bilaterally. No wheezing or crackles.


CVS: Regular rate and rhythm, S1 and S2 present, no murmurs or gallops 

appreciated. 


ABDOMEN: Soft, non-tender. No signs of distention. No rebound, no guarding, and 

no masses palpated. Bowel sounds are normal. 


EXTREMITIES: FROM in all major joints, no edema, no cyanosis or clubbing.


NEURO: Alert and oriented x 3. No acute neurological deficits. Speech is normal 

and follows commands. 


SKIN: Dry and warm.


GCS: 15.


NIH: 0.


Triage Information Reviewed: Yes


Vital Signs Reviewed: Yes





- Wrights Coma Scale


Best Eye Response: 4 - Spontaneous


Best Motor Response: 6 - Obeys Commands


Best Verbal Response: 5 - Oriented


Coma Scale Total: 15





Procedures





- Sedation


Patient Received Moderate/Deep Sedation with Procedure: No





Diagnostics





- Laboratory


Result Diagrams: 


 12/05/19 05:51





 12/05/19 05:51


Lab Statement: Any lab studies that have been ordered have been reviewed, and 

results considered in the medical decision making process.





- CT


  ** Brain CT


CT Interpretation Completed By: Radiologist


Summary of CT Findings: Impression: 1. No evidence for acute intracranial 

abnormality. 2. Atrophy and findings consistent with moderate to severe chronic 

small vessel ischemic changes. ED physician has reviewed this report.





- EKG


  ** 1317


Cardiac Rate: NL - 60 BPM


EKG Rhythm: Sinus Rhythm


EKG Comparison: No Significant Change - Similar to previous EKG on 8/13/19.


Summary of EKG Findings: EKG at 1317 reveals normal sinus rhythm at 60 BPM. 

Multiple PVCs. ED physician has reviewed and interpreted this EKG.





NIH Scale





- NIH Scale


Level of Consciousness: Alert/Keenly Responsive


Ask Patient the Month and His/Her Age: Both Correct


Ask Pt to Open/Close Eyes and /Release Non-Paretic Hand: Both Correctly


Best Gaze (Only Horizontal Eye Movement): Normal


Visual Field Testing: No Visual Loss


Facial Paresis-Pt to Smile & Close Eyes or Grimace Symmetry: Normal/Symmetrical


Motor Function - Right Arm: No Drift-Holds 10 Seconds


Motor Function - Left Arm: No Drift-Holds 10 Seconds


Motor Function - Right Leg: No Drift-Holds 10 Seconds


Motor Function - Left Leg: No Drift-Holds 10 Seconds


Limb Ataxia-Must be out of Proportion to Weakness Present: Absent


Sensory (Use Pinprick to Test Arms/Legs/Trunk/Face): Normal


Best Language (Describe Picture, Name Items): No Aphasia


Dysarthria (Read Several Words): Normal


Extinction and Inattention: No Abnormality


Total Score: 0





Course/Dx





- Course


Assessment/Plan: The patient is an 85 y/o M arriving by ambulance to [facility] with 

a chief complaint of numbness in the right side of the face and right hand 

onset around 1130. He reports that the numbness in the hands and face 

completely resolved after about 30 minutes, but he was concerned because he has 

a history of multiple TIAs occurring after an aortic valve replacement this 

past summer. He denies any fevers, chills, chest pain, or shortness of breath. 

EMS notes that he had a slight right facial droop that is no longer observable, 

and he was able to ambulate with a steady gait. He is currently on Warfarin. He 

is not in any pain. PMHx: thyroid disease, angina, defibrillator, cardiac arrest

, CAD, HLD, HTN, MI, peripheral vascular disease, valvular heart disease, 

cardiac stents, CABG, aortic valve replacement, asthma, PNA, GERD, TIA. 

Nonsmoker, no EtOH, no substance use. Medications reviewed. Allergies noted. In 

the emergency department, the patient is asymptomatic. The patient reports that 

all his symptoms are resolved. The patients symptoms lasted for half an hour. 

Blood work without any significant abnormality except for INR of 2.34, PTT of 

46.4, and BUN of 27. Urinalysis is negative for UTI. Head CT impression: No 

evidence for acute intra-abdominal abnormality. At this point, I discussed my 

physical exam and findings with and Dr. Lozada from neurology, and he would 

consult for this patient. I discussed my physical exam and test results with 

Dr. Xie from the hospitalist services, and he agrees to admit the patient to 

his services. The patient is hemodynamically stable alert and oriented x 3.





- Diagnoses


Provider Diagnoses: 


 TIA (transient ischemic attack)








- Physician Notifications


Discussed Care Of Patient With: Josef Lozada - neurology


Time Discussed With Above Provider: 15:00


Instructed by Provider To: Other - I discussed the patient's case with Dr. Lozada

, and he recommends a TIA workup with admission. I spoke with Dr. Xie at 1550

, and he accepts the patient for admission.





Discharge ED





- Sign-Out/Discharge


Documenting (check all that apply): Patient Departure - Patient is accepted for 

admission by Dr. Xie.





- Discharge Plan


Condition: Stable


Disposition: ADMITTED TO [facility]





- Billing Disposition and Condition


Condition: STABLE


Disposition: Admitted to [facility]





- Attestation Statements


Document Initiated by Jessika: Yes


Documenting Scribe: Rebecca Banuelos


Provider For Whom Jessika is Documenting (Include Credential): Dr. Golden Lopez MD


Scribe Attestation: 


Rebecca GOMEZ scribed for Dr. Golden Lopez MD on 12/06/19 at 0753. 


Scribe Documentation Reviewed: Yes


Provider Attestation: 


The documentation as recorded by the Rebecca jacobs accurately reflects 

the service I personally performed and the decisions made by me, Dr. Golden Lopez MD


Status of Scribe Document: Viewed

## 2019-12-04 NOTE — HP
CC:  Dr. Sadler *

 

HISTORY AND PHYSICAL:

 

DATE OF ADMISSION:  19

 

PROVIDER:  Tiffani Yanez NP

 

PRIMARY CARE PROVIDER:  Dr. Sadler.

 

ATTENDING PHYSICIAN WHILE IN THE HOSPITAL:  Dr. Aman Noonan * (dictated by 
Tiffani Yanez NP).

 

CHIEF COMPLAINT:  Right hand and facial numbness and tingling.

 

HISTORY OF PRESENT ILLNESS:  Mr. Campbell is an 86-year-old male with a past 
medical history significant for spinal stenosis, critical coronary artery 
disease, hypertension, hypothyroid, antiphospholipid syndrome, severe aortic 
stenosis, status post TAVR on 19, history of TIA, Meniere's disease, 
peripheral vascular disease, hyperlipidemia, carotid artery stenosis, history 
of cardioembolic stroke, who presented to the emergency room for the complaints 
of right hand and right side of his mouth numbness and tingling lasting 
approximately 20 minutes. The patient denies any recent illnesses.  Denies any 
fever, chills, unintended weight loss, chest pain, or edema.  Denies any cough, 
hemoptysis, shortness of breath.  Denies any nausea, vomiting, diarrhea, or 
abdominal pain.  Denies any gross hematuria, dysuria, focal weakness, or 
sensory loss.  Denies any recent changes in his vision.  Denies any difficulty 
swallowing. Denies any associated dizziness with this episode or slurred 
speech.  Denies any arthralgias, myalgias, rashes, lesions, or open sores.  He 
does report baseline dizziness, but does also report that his dizziness is at 
his baseline.

 

While in the emergency room, the patient had a CT of the brain which showed no 
acute intracranial abnormality.  Atrophy and findings consistent with moderate 
to severe chronic small vessel ischemic changes.  He was also seen in 
consultation by Dr. Lozada from Neurology due to his symptoms and previous 
history of cardioembolic stroke and TIAs.  Hospital Medicine was asked to see 
and evaluate him for admission to rule out TIA.

 

PAST MEDICAL HISTORY:  Significant for:

1.  Antiphospholipid syndrome.

2.  Aortic stenosis, status post TAVR, 19 at [place].

3.  History of TIA.

4.  Meniere's disease.

5.  Coronary artery disease, status post CABG.

6.  Peripheral vascular disease .

7.  Hypertension.

8.  Hyperlipidemia.

9.  Carotid artery stenosis.

 

PAST SURGICAL HISTORY:

1.  Appendectomy.

2.  Tonsillectomy.

3.  Cataract surgery.

4.  Trigger finger release.

5.  Permanent pacemaker/defibrillator placement and CABG.

6.  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

7.  Cardiac stent placement in .

8.  Bilateral hip replacement.

9.  Bilateral shoulder replacements.

10.  Bilateral carpal tunnel release.

11.  TAVR valve placement.

 

HOME MEDICATIONS:  Include:

1.  Coumadin 7.5 mg, Tuesday, Wednesday, Thursday, Saturday, .

2.  Coumadin 5 mg, Monday and Friday.

3.  Furosemide 20 mg p.o. daily.

4.  Meclizine 25 mg p.o. 4 times a day.

5.  Magnesium oxide 500 mg p.o. daily.

6.  Lovenox 80 mg subcu q. 12 hours as needed prior to surgical interventions.

7.  Naproxen 220 mg p.o. daily p.r.n.

8.  CBD oil 20 mg p.o. q.a.m.

9.  Calcium plus vitamin D 1 tablet p.o. daily.

10.  Vitamin B complex 1 tablet p.o. daily.

11.  Ferrous sulfate 325 mg p.o. daily.

12.  Melatonin 10 mg at bedtime.

13.  Glucosamine 1 cap p.o. daily.

14.  Multivitamin 1 tab p.o. daily.

15.  Gabapentin 300 mg p.o. t.i.d.

16.  Biotin 5000 mcg p.o. daily.

17.  Vitamin B12 1000 mcg p.o. q.a.m.

18.  Vitamin D3 5000 units p.o. daily.

19.  MiraLax 17 g p.o. daily.

20.  Colestipol 2 g p.o. b.i.d.

21.  Vitamin C 3000 mg p.o. b.i.d.

22.  Temazepam 7.5 mg 1 to 2 caps at bedtime.

23.  Omeprazole 20 mg p.o. daily.

24.  Omega-3 fatty acid 2000 mg p.o. b.i.d.

25.  Lisinopril 2.5 mg p.o. daily.

26.  Levothyroxine 100 mcg p.o. daily.

 

ALLERGIES:  To ZETIA, BETA-BLOCKERS, NIACIN, SIMVASTATIN, and STATINS.

 

FAMILY HISTORY:  Mother  of colon cancer and father  from ischemic 
colitis. No reported history of coronary artery disease or diabetes within the 
family.

 

SOCIAL HISTORY:  The patient denies any history of tobacco use.  Does report 
rare alcohol use.  Denies any illicit drug use.  He is a retired inventor.  
Surrogate decision maker in the event he is unable to make his own decisions is 
his daughter, Kimberly Rausch.  He is a full code.

 

REVIEW OF SYSTEMS:  A 14-point review of systems was completed.  All pertinent 
positives are mentioned in the HPI.

 

                               PHYSICAL EXAMINATION

 

GENERAL:  At this time, Mr. Campbell is an 86-year-old male.  He is resting 
comfortably on the stretcher in the emergency room.  His speech is within 
normal limits.  He is alert and oriented x3.

 

VITAL SIGNS:  Blood pressure 118/57, heart rate 60, respirations are 18, O2 
saturation was 95%, temperature was 98.3.

 

HEENT:  Head is atraumatic, normocephalic.  Eyes:  EOMs are intact.  Sclerae 
anicteric and not pale.  Oral mucosa appeared to be moist.

 

NECK:  Supple.

 

LUNGS:  Clear to auscultation bilaterally.  No wheezes, rales, or rhonchi.

 

CARDIAC:  S1, S2.  Irregular rate.  No rubs or gallops.

 

ABDOMEN:  Soft and nontender.  Bowel sounds are present x4.

 

EXTREMITIES:  He is able to move all 4 extremities.  There is no clubbing or 
cyanosis.  He does have +1 pitting edema noted to bilateral lower extremities. 
Pedal pulses are +1 bilaterally.

 

SKIN:  His skin is intact.

 

NEUROLOGIC:  He is awake, alert, oriented x3.  Speech is clear.  Thought 
process is intact.  Tongue is midline.  He does have mild right facial 
drooping.  Finger-to- nose is intact.  There is no pronator drift.  There is no 
leg drift.  Push-pull is intact.  Sensation is intact to all 4 extremities.  
Smile is equal.

 

 DIAGNOSTIC STUDIES/LAB DATA:  WBCs are 8.2, RBCs 5.12, hemoglobin 15.1, 
hematocrit is 44.  INR is 2.34.  Sodium 138, potassium 4.4, chloride 104, 
carbon dioxide 27.  Anion gap 7.  BUN was 27, creatinine 1.01.  Glucose 87.  
Lactic acid 0.7.  Calcium was 9.7.  ASTs were 32, ALTs were 26, alkaline 
phosphatase was 55. Troponin was 0.01.  Albumin is 4.1.  TSH was 2.70.  
Triglycerides were 147, cholesterol 128, LDL was 56.  Urine was within normal 
limits with the exception of trace leukocyte esterase and ascorbic acid was 
trace.  Serum alcohol was less than 10.

 

He had a CT of the brain, radiologist's impression:  No evidence of acute 
intracranial abnormality.  Atrophy and findings consistent with moderate to 
severe chronic small vessel ischemic changes.  



He had a CT of the head and neck which showed no acute findings.  No acute 
abnormalities.  



He had an electrocardiogram which showed sinus rhythm at a rate of 60 with 
occasional paced beats, left bundle-branch block which is consistent with prior 
EKGs.

 

ASSESSMENT AND PLAN:  Mr. Campbell is an 86-year-old male with past medical 
history significant for coronary artery disease, status post aortic valve 
replacement and coronary artery bypass graft, hypertension, hypothyroid, 
antiphospholipid, history of transient ischemic attack, and cardioembolic stroke
, who presented to the emergency room with complaints of right facial and right 
hand numbness that lasted approximately 20 minutes and completely resolved.  
The patient will be admitted under observation for:

 

1.  Transient ischemic attack.  I suspect his symptoms are related to transient 
ischemic attack.  He was seen in consultation by Neurology, Dr. Lozada who has 
recommended a CTA of the head and neck which was completed and within normal 
limits.  We will get a transthoracic echocardiogram with bubble study.  I will 
place him on neuro checks q.4 hours.  He is currently on Coumadin.  We will 
continue his Coumadin and add aspirin 81 mg as per Dr. Lozada's recommendations.  
His lipid profile, his LDL was 56.  He does have an allergy to STATINS.  I will 
not place him on statin at this time.  He is currently therapeutic with an INR 
of 2.34. We will continue his Coumadin and add aspirin 81 mg p.o. daily.  We 
will repeat a CT of the head as per recommendations of Dr. Lozada as the patient 
is unable to have an MRI due to the pacemaker defibrillator that he has.

2.  Antiphospholipid syndrome.  The patient will continue on Coumadin at 7.5 
alternating with 5 as prescribed.

3.  Coronary artery disease.  The patient will continue on Coumadin and Lasix 
as previously prescribed.

4.  Hypothyroid.  He will continue on levothyroxine 100 mcg p.o. daily.

5.  Hypertension.  He will continue on lisinopril 2.5 mg p.o. daily.

6.  Gastroesophageal reflux disease.  He will continue on omeprazole 20 mg p.o. 
daily.

7.  FEN:  He can have a regular diet.

8.  Code status:  He is a full code.

9.  DVT prophylaxis.  He is on Coumadin with a therapeutic INR.

 

TIME SPENT:  Time spent on this admission was 60 minutes, greater than half 
that time was spent at the bedside reviewing events leading thus far to his 
hospitalization, performing physical exam, and reviewing my plan of care.

 

I have discussed this with my attending, Dr. Aman Noonan; he is in agreement 
with my plan.

 

 ____________________________________ TIFFANI YANEZ, NP

 

[id number]/CPS #: [id number]

DELMIS

## 2019-12-05 VITALS — DIASTOLIC BLOOD PRESSURE: 58 MMHG | SYSTOLIC BLOOD PRESSURE: 126 MMHG

## 2019-12-05 LAB
ANION GAP SERPL CALC-SCNC: 5 MMOL/L (ref 2–11)
BASOPHILS # BLD AUTO: 0 10^3/UL (ref 0–0.2)
BUN SERPL-MCNC: 22 MG/DL (ref 6–24)
BUN/CREAT SERPL: 26.8 (ref 8–20)
CALCIUM SERPL-MCNC: 8.8 MG/DL (ref 8.6–10.3)
CHLORIDE SERPL-SCNC: 108 MMOL/L (ref 101–111)
EOSINOPHIL # BLD AUTO: 0.8 10^3/UL (ref 0–0.6)
GLUCOSE SERPL-MCNC: 99 MG/DL (ref 70–100)
HCO3 SERPL-SCNC: 28 MMOL/L (ref 22–32)
HCT VFR BLD AUTO: 43 % (ref 42–52)
HGB BLD-MCNC: 14.4 G/DL (ref 14–18)
INR PPP/BLD: 2.24 (ref 0.82–1.09)
LYMPHOCYTES # BLD AUTO: 1 10^3/UL (ref 1–4.8)
MCH RBC QN AUTO: 29 PG (ref 27–31)
MCHC RBC AUTO-ENTMCNC: 34 G/DL (ref 31–36)
MCV RBC AUTO: 86 FL (ref 80–94)
MONOCYTES # BLD AUTO: 1.4 10^3/UL (ref 0–0.8)
NEUTROPHILS # BLD AUTO: 6.8 10^3/UL (ref 1.5–7.7)
NRBC # BLD AUTO: 0 10^3/UL
NRBC BLD QL AUTO: 0
PLATELET # BLD AUTO: 147 10^3/UL (ref 150–450)
POTASSIUM SERPL-SCNC: 3.9 MMOL/L (ref 3.5–5)
RBC # BLD AUTO: 4.93 10^6 /UL (ref 4.18–5.48)
SODIUM SERPL-SCNC: 141 MMOL/L (ref 135–145)
WBC # BLD AUTO: 9.9 10^3/UL (ref 3.5–10.8)

## 2019-12-05 RX ADMIN — OXYCODONE HYDROCHLORIDE AND ACETAMINOPHEN SCH MG: 500 TABLET ORAL at 08:48

## 2019-12-05 RX ADMIN — GABAPENTIN SCH MG: 300 CAPSULE ORAL at 08:48

## 2019-12-05 RX ADMIN — MECLIZINE HYDROCHLORIDE PRN MG: 12.5 TABLET ORAL at 13:19

## 2019-12-05 RX ADMIN — GABAPENTIN SCH MG: 300 CAPSULE ORAL at 13:18

## 2019-12-05 NOTE — ECHO
*[facility]*

[place] [address]

Phone: [phone]

Fax #: [phone]



-------------------------------------------------------------------

Transthoracic Echocardiogram



Patient: Wilmer Campbell               MRN:        [id number]

:     1933                         Study Date: 2019

Age:     86                                 Accession#: [id number]

Gender:  M                                  HR:         66 bpm

Height:  66 in /167.6 cm                    BSA:        1.9 m^2

Weight:  177.6 lb /80.7 kg                  BMI:        28.7 kg/m^2



*Sonographer: * Shari Messer [facility] Hali Young

 

*Referring Physician: * Josef Lozada

*Reading Physician: * Mick Graves MD



-------------------------------------------------------------------

Indications:   TIA.



-------------------------------------------------------------------

History:   PMH:   Myocardial infarction.  Risk factors:

Hypertension. Dyslipidemia.



Labs, prior tests, procedures, and surgery:

Aortic transcatheter valve replacement (TVR).



ICD system implantation.

Coronary artery bypass grafting.



-------------------------------------------------------------------

Conclusions



Summary:



- Left ventricle: The cavity size is below normal. Wall thickness

  is mildly to moderately increased. Systolic function is normal.

  The estimated ejection fraction is 55-60%.

- Right ventricle: The cavity size is normal. Pacer wire noted in

  the right ventricle. Systolic function is normal.

- Left atrium: The atrium is moderately dilated.

- Mitral valve: The Mitral valve annulus appears moderately

  calcified. The leaflets are mildly thickened. The findings are

  consistent with mild stenosis.

- Aortic valve: There is a bioprosthetic valve that is functionally

  normally

- Pulmonary arteries: Systolic pressure can not be accurately

  estimated.



Recommendations:  Compared to prior study from 2019, it appears

the patient underwent a primary prevention ICD and is now also

paced. He ejection fraction remains normal.



-------------------------------------------------------------------

Study data:  Transthoracic echocardiogram.  Procedure:

Transthoracic echocardiography was performed. Image quality was

fair.  Complete 2D, spectral Doppler, and color flow Doppler.

Location:  Bedside.  Patient status:  Inpatient. Patient room

number: 449.  Rhythm:  Normal sinus rhythm with PVC&apos;s.



-------------------------------------------------------------------

Findings



Left ventricle:  The cavity size is below normal. Wall thickness is

mildly to moderately increased. Systolic function is normal. The

estimated ejection fraction is 55-60%. Wall motion is normal; there

are no regional wall motion abnormalities. Doppler parameters are

consistent with abnormal left ventricular relaxation (grade 1

diastolic dysfunction).

Right ventricle:  The cavity size is normal. Pacer wire noted in

the right ventricle. Systolic function is normal.

Ventricular septum:   There is abnormal interventricular septal

wall motion consistent with an RV pacemaker.

Left atrium:  The atrium is moderately dilated.

Right atrium:  The atrium is at the upper limits of normal in size.

Pacer wire noted in right atrium.

Mitral valve:  The Mitral valve annulus appears moderately

calcified. The leaflets are mildly thickened.  The findings are

consistent with mild stenosis.   There is trace regurgitation.

Aortic valve:  Not well visualized. There is a bioprosthetic valve

that is functionally normally There is no significant

regurgitation.

Tricuspid valve:  The leaflets are normal thickness.  There is no

evidence of stenosis.   There is no significant regurgitation.

Pulmonic valve:   The leaflets are normal thickness.  There is no

evidence of stenosis.   There is trace regurgitation.

Aorta:  The aortic root appears normal. The aortic arch appears

normal.

Pericardium:  There is no significant pericardial effusion.

Pulmonary arteries:

The main pulmonary artery is normal-sized.  Systolic pressure can

not be accurately estimated.

Systemic veins:

Inferior vena cava: The vessel is normal in size.



-------------------------------------------------------------------

Measurements



 Left ventricle            Value        Ref         Aortic valve               Value       Ref

 OSMAR, LAX          (L)     3.9   cm     4.2 - 5.8   Francis diam, ED               2.4   cm    ----

 ESD, LAX                  2.7   cm     2.5 - 4.0   Peak v, S                  1.9   m/sec ----

 FS, LAX                   31    %      25 - 43     VTI, S                     37.0  cm    ----

 PW, ED, LAX       (H)     1.5   cm     0.6 - 1.0   Accel time                 79    ms    ----

 E&apos;, lat francis, TDI  (L)     4.7   cm/sec &gt;=10.0      Mean grad, S               8.0   mm Hg --
--

 E/e&apos;, lat francis,            22           ----------  Peak grad, S               14.0  mm Hg ----

 TDI                                                LVOT/AV, VTI ratio         0.6         ----

 E&apos;, med francis, TDI  (L)     4.0   cm/sec &gt;=7.0       ASHLEY, VTI                   1.92  cm^2  --
--

 E/e&apos;, med francis,            26           ----------  ASHLEY, Vmax                  1.82  cm^2  ----

 TDI

 E&apos;, avg, TDI              4.4   cm/sec ----------  Mitral valve               Value       Ref

 E/e&apos;, avg, TDI    (H)     24           &lt;=14        Peak E                     1.05  m/sec --
--

                                                    Peak A                     1.35  m/sec ----

 LVOT                      Value        Ref         Decel time                 211   ms    ----

 Diam, S                   2.10  cm     ----------  PHT                        95    ms    ----

 Area                      3.5   cm^2   ----------  Mean grad, D               4.0   mm Hg ----

 Peak alberta, S               1     m/sec  ----------  Peak grad, D               8.0   mm Hg ----

 VTI, S                    20.5  cm     ----------  Peak E/A ratio             0.8         ----

 Peak grad, S              4     mm Hg  ----------  MVA, PHT                   2.3   cm^2  ----

 Mean grad, S              2     mm Hg  ----------

 SV                        71    ml     ----------  Pulmonic valve             Value       Ref

                                                    Peak v, S                  0.88  m/sec ----

 Ventricular septum        Value        Ref         Peak grad, S               3.0   mm Hg ----

 IVS, ED           (H)     1.5   cm     0.6 - 1.0

                                                    Aortic root                Value       Ref

 Right ventricle           Value        Ref         Root diam                  3.5   cm    &lt;4.1

 OSMAR, LAX                  2.4   cm     ----------

                                                    Aortic arch                Value       Ref

 Left atrium               Value        Ref         Arch diam                  3.3   cm    ----

 AP dim, ES        (H)     4.60  cm     3.00 -

                                        4.00        Decending aorta            Value       Ref

 ML dim, A4C               4.1   cm     ----------  [place] alber[place]               0.59  m/sec ----

 SI dim, A4C               6.2   cm     ----------

 Vol/bsa, ES, A/L  (H)     45    ml/m^2 16 - 34     Inferior vena cava         Value       Ref

                                                    Diam                       2.0   cm    ----

 Right atrium              Value        Ref

 SI dim, ES        (H)     5.5   cm     3.4 - 5.3

 ML dim, ES, A4C           3.7   cm     2.6 - 4.4

 Estimated RAP             8     mm Hg  ----------

 

Legend:

(L)  and  (H)  mikhail values outside specified reference range.



Prepared and electronically signed by



Mick Graves MD

2019 13:16

## 2019-12-05 NOTE — PN
Subjective


Date of Service: 12/05/19


Length of Stay: 1 Days





Neurology is following [] for the evaluation and management of []





Interval History: 





Pt examined today at the bedside today.  He states that he is feeling better.  

He states that the numbness of the face and of the right hand have now 

resolved.  He denies any new weakness and denies any new numbness.  He denies 

any trouble with speech.  He states that he is doing well.  











ROS-denies fever, denies chills, denies chest pain, denies shortness of breathe

, denies abdominal pain, denies nausea, denies vomiting, denies lightheadedness

, review of 14 systems completed all others negative, 


Review of Systems: 


*****


Denied CP, SOB, or palpitations.








Objective


Active Medications: 








Acetaminophen (Tylenol Tab*)  650 mg PO Q4H PRN


   PRN Reason: MILD PAIN or TEMP > 100.4


Ascorbic Acid (Vitamin C  Tab*)  2,000 mg PO BID [facility]


   Last Admin: 12/04/19 22:03 Dose:  2,000 mg


Aspirin (Aspirin 81 Mg Chew Tab*)  81 mg PO DAILY [facility]


Cholecalciferol (Vitamin D Tab*)  5,000 units PO DAILY [facility]


Cyanocobalamin (Vitamin B12 Tab*)  1,000 mcg PO QAM [facility]


Ferrous Sulfate (Ferrous Sulfate Tab*)  325 mg PO DAILY [facility]


Furosemide (Lasix Tab*)  20 mg PO DAILY [facility]


Gabapentin (Neurontin Cap(*))  300 mg PO TID [facility]


   Last Admin: 12/04/19 22:04 Dose:  300 mg


Levothyroxine Sodium (Synthroid Tab*)  100 mcg PO DAILY@0600 [facility]


   Last Admin: 12/05/19 05:20 Dose:  100 mcg


Lisinopril (Prinivil Tab*)  2.5 mg PO DAILY [facility]


Magnesium Oxide (Magox 400 Tab*)  400 mg PO DAILY [facility]


Meclizine HCl (Antivert Tab*)  12.5 mg PO QID PRN


   PRN Reason: DIZZINESS


   Last Admin: 12/04/19 22:05 Dose:  12.5 mg


Melatonin (Melatonin)  9 mg PO BEDTIME [facility]


   Last Admin: 12/04/19 22:04 Dose:  9 mg


Multivitamins/Minerals (Theragran/Minerals Tab*)  1 tab PO QAM [facility]


Pantoprazole Sodium (Protonix Tab*)  40 mg PO DAILY [facility]


Polyethylene Glycol/Electrolytes (Miralax*)  17 gm PO DAILY [facility]


Vitamin B Complex/Vitamin E (B Complex-50*)  1 tab PO DAILY [facility]


Warfarin Sodium (Coumadin Tab(*))  5 mg PO MoFr@1700 [facility]; Protocol


Warfarin Sodium (Coumadin Tab(*))  7.5 mg PO SuTuWeThFrSa@1700 [facility]; Protocol


   Last Admin: 12/04/19 22:10 Dose:  7.5 mg








 Vital Signs











  12/04/19 12/04/19 12/04/19





  12:40 13:45 13:48


 


Temperature 98.3 F  


 


Pulse Rate 60  60


 


Respiratory 14 29 25





Rate   


 


Blood Pressure 150/89  155/75





(mmHg)   


 


O2 Sat by Pulse 94  93





Oximetry   














  12/04/19 12/04/19 12/04/19





  14:00 14:07 14:17


 


Temperature   


 


Pulse Rate   


 


Respiratory 16  22





Rate   


 


Blood Pressure   149/77





(mmHg)   


 


O2 Sat by Pulse  96 





Oximetry   














  12/04/19 12/04/19 12/04/19





  14:47 15:00 15:17


 


Temperature   


 


Pulse Rate 59 59 60


 


Respiratory 12 16 15





Rate   


 


Blood Pressure 139/69  164/71





(mmHg)   


 


O2 Sat by Pulse 91 90 92





Oximetry   














  12/04/19 12/04/19 12/04/19





  15:47 16:00 16:18


 


Temperature   


 


Pulse Rate 59 60 


 


Respiratory 20 16 19





Rate   


 


Blood Pressure 154/76  157/102





(mmHg)   


 


O2 Sat by Pulse 91 90 





Oximetry   














  12/04/19 12/04/19 12/04/19





  17:00 18:01 18:17


 


Temperature   


 


Pulse Rate  68 60


 


Respiratory 13 7 17





Rate   


 


Blood Pressure   135/65





(mmHg)   


 


O2 Sat by Pulse  95 91





Oximetry   














  12/04/19 12/04/19 12/04/19





  18:47 19:00 19:17


 


Temperature   


 


Pulse Rate   


 


Respiratory 18 16 18





Rate   


 


Blood Pressure 118/57  118/57





(mmHg)   


 


O2 Sat by Pulse   





Oximetry   














  12/04/19 12/04/19 12/04/19





  20:08 20:17 22:04


 


Temperature 0 F 97.9 F 


 


Pulse Rate 0 60 


 


Respiratory 0 20 20





Rate   


 


Blood Pressure 0/0 152/67 





(mmHg)   


 


O2 Sat by Pulse 0 94 





Oximetry   














  12/04/19 12/05/19 12/05/19





  23:53 00:15 03:19


 


Temperature 97.9 F  98.5 F


 


Pulse Rate 60  66


 


Respiratory 18 18 16





Rate   


 


Blood Pressure 125/42  133/43





(mmHg)   


 


O2 Sat by Pulse 96  100





Oximetry   














  12/05/19





  07:15


 


Temperature 98.0 F


 


Pulse Rate 59


 


Respiratory 20





Rate 


 


Blood Pressure 152/70





(mmHg) 


 


O2 Sat by Pulse 94





Oximetry 











Intake and Output Last 24 Hours











 12/03/19 12/04/19 12/05/19 12/06/19





 06:59 06:59 06:59 06:59


 


Intake Total   1000 


 


Balance   1000 


 


Weight   184 lb 9.6 oz 


 


Intake:    


 


  IV Fluids   1000 


 


  Oral   0 


 


Other:    


 


  # Voids   1 











Oxygen Devices in Use Now: None


Neurology Exam: 


General: 





Well nourished, well developed, and in no acute distress





HEENT: Normocephelic/atraumatic, sclera anicteric, mucous membranes moist





Neck: Supple





Chest: Clear to auscultation bilaterally 





Cardiovascular: Regular rate and rhythm without murmurs, rubs, gallops





Abdomen: Soft, non-tender/non-distended





Extremities: No clubbing, cyanosis, or edema








Neurological Findings: 





Awake, alert, and oriented to person, place, and time.





Speech: fluent without dysarthria, repetition intact





Cranial Nerve: PERRL, EOM intact, VFF, no nystagmus, face symmetric bilaterally

, facial sensation intact, hearing intact to finger rub bilaterally, palate 

elevates symmetrically, tongue midline, SCM and Trapezius 5/5. i do not 

appreciated right sided facial weakness today 





Motor: 5/5 throughout, proximal and distal extremities x4 tone/bulk normal





Sensation: intact to LT/PP bilaterally upper and lower extremities





Deep Tendon Reflex: 1+ symmetric in the upper/lower extremities, 





Finger to nose, rapid alternating movements intact without tremor, no 

dysdiadochokinesia





Gait: able to stand from bed, wide based, 





Romberg minimal sway with eyes opened and closed, 











Result Diagrams: 


 12/05/19 05:51





 12/05/19 05:51





Assessment/Plan





87 y/o male patient presenting to [facility] with complaints of numbness and tingling 

to the right side of his face and numbness and tingling to the right fifth 

fourth and thrid fingers.  He notes that it lasted at most 30 minutes.  A right 

facial droop was reported.  The patient symptoms resolved and he was admitted 

for a TIA











TIA 


   : Add baby ASA daily 


   : Repeat CT Brain today (unable to have MRI given AICD) 


   : CTA head neck negative 


   : Await Echo 


   : continue coumadin 


   : Tele 


   : Neuro Checks 


   : Repeat Ct brain and echo negative can D/C from neuro standpoint with 

follow  up in Dr Turner's clinic 





CAD


   : Per Primary Team 





Antiphosolipid Syndrome: 


   : Follow with Dr Wan 


   : Coumadin 





PVD 


   : Per primary Team 





HTN 


   : Stable 





HLD 


   : Maintain LDL less than 70 





Await CT brain, echo if negative able to d/c home.

## 2019-12-05 NOTE — DS
*** DICTATION ENDS ABRUPTLY ***



DISCHARGE SUMMARY:

 

DATE OF ADMISSION:  12/04/19

 

DATE OF DISCHARGE:  12/05/19

 

PROVIDER:  Allan Yanez NP

 

PRIMARY CARE PROVIDER:  Dr. Sadler.

 

ATTENDING PHYSICIAN WHILE IN THE HOSPITAL:  Dr. Aman Noonan * (dictated by 
Allan Yanez NP).

 

*** DICTATION ENDS ABRUPTLY ***



 

____________________________________ ALLAN YANEZ NP

 

[id number]/CPS #: [id number]

MTDD

## 2019-12-07 NOTE — DS
DISCHARGE SUMMARY:

 

DATE OF ADMISSION:  12/04/19

 

DATE OF DISCHARGE:  12/05/19

 

PROVIDER:  Tiffani Yanez NP

 

PRIMARY CARE PROVIDER:  Dr. Sadler.

 

ATTENDING PHYSICIAN WHILE IN THE HOSPITAL:  Dr. Aman Noonan * (dictated by 
Tiffani Yanez NP).

 

PRIMARY DIAGNOSIS:  Transient ischemic attack.

 

SECONDARY DIAGNOSES:

1.  Antiphospholipid syndrome.

2.  Aortic stenosis, status post transcatheter aortic valve replacement.

3.  Meniere's disease.

4.  Coronary artery disease, status post coronary artery bypass graft.

5.  Peripheral vascular disease.

6.  Hypertension.

7.  Hyperlipidemia.

 

STUDIES COMPLETED WHILE IN THE HOSPITAL:  The patient had a CT of the brain, 
radiologist impression:  No evidence of acute intracranial abnormality, atrophy
, and findings consistent with moderate to severe chronic small vessel ischemic 
changes.  He had a CTA of the head and neck, which showed no acute findings, no 
acute abnormalities.  He had a transthoracic echocardiogram.  The cavity size 
is below normal.  The wall thickness is mildly to moderately increased.  
Systolic function is normal.  Estimated ejection fraction is 55% to 60%.  The 
right ventricle, the cavity size is normal.  Pacer wires are noted in the right 
ventricle.  Systolic function is normal.  The left atrium, the atrium is mildly 
dilated.  Mitral valve, the mitral valve annulus appears moderately calcified.  
The leaflets are mildly thickened.  The findings are consistent with mild 
stenosis.  Aortic valve, there is a bioprosthetic valve that is functionally 
normal. Pulmonary artery systolic pressure cannot be accurately estimated.  Next
, compared to prior study from 07/27/19 appears the patient underwent primary 
prevention ICD and is now also paced.  His ejection fraction remains normal.  
He had repeat CT of the brain on 12/05/19.  No acute cranial abnormality on the 
CT, chronic small vessel ischemic disease is likely intracranial 
atherosclerotic disease.  Next, he had a consultation from Neurology, Dr. Erasmo Lozada.  Please refer to his consultation note for complete details.

 

DISCHARGE MEDICATIONS:  New medications:  

1.  Aspirin 81 mg p.o. daily.

 

Continued home medications:

1.  Coumadin 5/7.5 mg as instructed weekly.

2.  Furosemide 20 mg p.o. daily.

3.  Meclizine 25 mg p.o. 4 times a day.

4.  Magnesium oxide 500 mg p.o. daily.

5.  Lovenox 80 mg subcu q.12 hours as needed prior to surgeries.

6.  Naproxen 220 mg p.o. daily p.r.n.

7.  CBD 20 mg p.o. q.a.m.

8.  Calcium 1 tablet p.o. daily.

9.  Vitamin B complex 1 cap p.o. daily.

10.  Ferrous sulfate 325 mg p.o. daily.

11.  Melatonin 10 mg p.o. at bedtime.

12.  Glucosamine 1 cap p.o. daily.

13.  Multivitamin 1 tab p.o. daily.

14.  Gabapentin 300 mg p.o. t.i.d.

15.  Biotin 500 mcg p.o. daily.

16.  Vitamin B12 1000 mcg p.o. daily.

17.  Vitamin D3 5000 units p.o. daily.

18.  MiraLAX 17 grams p.o. daily.

19.  Ascorbic acid 2000 mg p.o. b.i.d.

20.  Temazepam 7.5 mg 1 to 2 at bedtime.

21.  Omeprazole 20 mg p.o. daily.

22.  Omega-3 fatty acid 2000 mg p.o. b.i.d.

23.  Lisinopril 2.5 mg p.o. daily.

24.  Levothyroxine 100 mcg p.o. daily.

 

HISTORY OF PRESENT ILLNESS AND HOSPITAL COURSE:  Mr. Campbell is an 86-year-
old male with a past medical history significant for spinal stenosis, coronary 
artery disease, hypertension, hypothyroid, antiphospholipid syndrome, severe 
aortic stenosis, status post TAVR; history of TIA, Meniere's disease, who 
presented to the emergency room with the complaints of right hand and right 
side of his mouth numbness and tingling that lasted approximately 20 minutes.  
The patient reports that his symptoms resolved prior to his arrival to the 
emergency room, and he has had no further symptoms, so it is noted the patient 
does have a mild right lip droop, which is unclear if this new.  Next, the 
patient did report that a day prior to his admission, he had been snow bowling 
for 5 hours.  Due to his concern of CVA, due to his history of TIAs and history 
of cardioembolic CVA, Hospital Medicine was asked to admit for further 
observation.

 

While in the hospital, the patient was seen and evaluated by Neurology, Dr. Erasmo Lozada, who recommended the patient continue on previous medications 
as prescribed with the addition of aspirin 81 mg p.o. daily.  He is to follow 
up with Dr. Turner in 6 to 8 weeks.  The patient had no arrhythmias during 
this hospitalization.  On the day of discharge, the patient was back to his 
baseline and feeling well.

 

At this time, the patient is stable for discharge home.

 

REVIEW OF SYSTEMS:  The patient denies any fever or chills.  Denies any chest 
pain or edema.  Denies any cough or congestion.  Denies any shortness of 
breath.  Denies any nausea, vomiting, diarrhea or abdominal pain.  Denies any 
urinary frequency, urgency or pain with urination.  Denies any slurred speech.  
Denies any difficulty swallowing.  Denies any headache, dizziness, weakness on 
one side.  Denies any open lesions or rashes, psychosis or anxiety.

 

PHYSICAL EXAMINATION: General:  At this time, Mr. Campbell is an 86-year-old 
male.  He is alert and oriented x3.  His speech is clear.  Vital Signs:  Blood 
pressure 126/58, heart rate is 66, respirations are 16, and O2 saturation 95% 
on room air, temperature was 98.7.  HEENT:  Head is atraumatic, normocephalic.  
Eyes:  EOMs are intact.  Sclerae anicteric and not pale.  Oral mucosa appeared 
to be moist.  Neck is supple.  Lungs are clear to auscultation bilaterally.  No 
wheezes, rales or rhonchi.  Cardiac: S1, S2.  Regular rate and rhythm.  No 
murmurs, rubs or gallops.  Abdomen is soft and nontender.  Bowel sounds are 
present x4.  Extremities:  He is able to move all 4 extremities. There is no 
clubbing or cyanosis.  Neurologic:  He is awake, alert, oriented x3.  His 
speech is clear.  Thought process is intact.  There are no gross focal 
deficits.  He does have a mild right lip droop.  He has no pronator drift. 
Finger-to-nose is intact.  He has no leg drift.  Push-pull is intact.  
Sensation is intact to all 4 extremities.  Speech is clear.  Tongue is midline.

 

At this time, Mr. Campbell is stable for discharge home.

 

DISCHARGE PLAN:  Mr. Campbell will be discharged home.  Activity as tolerated.

 

1.  Transient ischemic attack.  The patient likely had a transient ischemic 
attack, as he has had complete resolution of his symptoms of right hand and 
right facial numbness and tingling.  He was seen in consultation by Dr. Lozada 
from Neurology, who recommended the patient be placed on aspirin as well as 
Coumadin.  He will be started on baby aspirin 81 mg p.o. daily.  He will 
continue his Coumadin due to underlying phospholipid syndrome as well.  He will 
also continue his statin.  He is not on a statin due to his intolerance to all 
statins.  The patient does have an LDL of 56, which is within normal limits.  
The patient should follow up with Dr. Turner from Neurology in 6 to 8 weeks.  
He should call for an appointment.  He should follow up with his primary care 
provider in 4 to 7 days for an appointment.

2.  Chronic medical conditions.  The patient should resume all previous 
medications as previously prescribed.

3.  The patient should follow up with his primary care provider in 4 to 7 days.
  He should follow up with Dr. Turner in 6 to 8 weeks.

4.  The patient was instructed to return to the emergency room for any weakness 
on one side, slurred speech, dizziness, weakness, difficulty swallowing or any 
other concerning symptoms.  The patient verbalized understanding.

 

I have discussed this with my attending, Dr. Aman Noonan, he is in agreement 
with my plan.

 

____________________________________ TIFFANI YANEZ, TASH

 

[id number]/CPS #: [id number]

DELMIS

## 2020-01-27 ENCOUNTER — HOSPITAL ENCOUNTER (EMERGENCY)
Dept: HOSPITAL 25 - ED | Age: 85
LOS: 1 days | Discharge: HOME | End: 2020-01-28
Payer: MEDICARE

## 2020-01-27 DIAGNOSIS — E78.00: ICD-10-CM

## 2020-01-27 DIAGNOSIS — Z79.01: ICD-10-CM

## 2020-01-27 DIAGNOSIS — I25.10: ICD-10-CM

## 2020-01-27 DIAGNOSIS — K21.9: ICD-10-CM

## 2020-01-27 DIAGNOSIS — Z95.1: ICD-10-CM

## 2020-01-27 DIAGNOSIS — I73.9: ICD-10-CM

## 2020-01-27 DIAGNOSIS — Z86.74: ICD-10-CM

## 2020-01-27 DIAGNOSIS — I10: ICD-10-CM

## 2020-01-27 DIAGNOSIS — I25.2: ICD-10-CM

## 2020-01-27 DIAGNOSIS — Z95.2: ICD-10-CM

## 2020-01-27 DIAGNOSIS — E03.9: ICD-10-CM

## 2020-01-27 DIAGNOSIS — D68.61: ICD-10-CM

## 2020-01-27 DIAGNOSIS — J45.909: ICD-10-CM

## 2020-01-27 DIAGNOSIS — Z95.810: ICD-10-CM

## 2020-01-27 DIAGNOSIS — Z88.8: ICD-10-CM

## 2020-01-27 DIAGNOSIS — Z86.73: ICD-10-CM

## 2020-01-27 DIAGNOSIS — Z79.899: ICD-10-CM

## 2020-01-27 DIAGNOSIS — Z96.641: ICD-10-CM

## 2020-01-27 DIAGNOSIS — G45.9: Primary | ICD-10-CM

## 2020-01-27 DIAGNOSIS — Z95.5: ICD-10-CM

## 2020-01-27 DIAGNOSIS — Z79.890: ICD-10-CM

## 2020-01-27 LAB
ANION GAP SERPL CALC-SCNC: 3 MMOL/L (ref 2–11)
APTT PPP: 51 SECONDS (ref 26–38)
BASOPHILS # BLD AUTO: 0 10^3/UL (ref 0–0.2)
BUN SERPL-MCNC: 20 MG/DL (ref 6–24)
BUN/CREAT SERPL: 20.4 (ref 8–20)
CALCIUM SERPL-MCNC: 9 MG/DL (ref 8.6–10.3)
CHLORIDE SERPL-SCNC: 103 MMOL/L (ref 101–111)
EOSINOPHIL # BLD AUTO: 0.5 10^3/UL (ref 0–0.6)
GLUCOSE SERPL-MCNC: 96 MG/DL (ref 70–100)
HCO3 SERPL-SCNC: 31 MMOL/L (ref 22–32)
HCT VFR BLD AUTO: 39 % (ref 42–52)
HGB BLD-MCNC: 13.5 G/DL (ref 14–18)
INR PPP/BLD: 3.38 (ref 0.82–1.09)
LYMPHOCYTES # BLD AUTO: 1.2 10^3/UL (ref 1–4.8)
MCH RBC QN AUTO: 29 PG (ref 27–31)
MCHC RBC AUTO-ENTMCNC: 34 G/DL (ref 31–36)
MCV RBC AUTO: 86 FL (ref 80–94)
MONOCYTES # BLD AUTO: 1.1 10^3/UL (ref 0–0.8)
NEUTROPHILS # BLD AUTO: 5.4 10^3/UL (ref 1.5–7.7)
NRBC # BLD AUTO: 0 10^3/UL
NRBC BLD QL AUTO: 0
PLATELET # BLD AUTO: 151 10^3/UL (ref 150–450)
POTASSIUM SERPL-SCNC: 4.3 MMOL/L (ref 3.5–5)
RBC # BLD AUTO: 4.6 10^6 /UL (ref 4.18–5.48)
SODIUM SERPL-SCNC: 137 MMOL/L (ref 135–145)
TROPONIN I SERPL-MCNC: 0.01 NG/ML (ref ?–0.03)
WBC # BLD AUTO: 8.2 10^3/UL (ref 3.5–10.8)

## 2020-01-27 PROCEDURE — 85730 THROMBOPLASTIN TIME PARTIAL: CPT

## 2020-01-27 PROCEDURE — 93005 ELECTROCARDIOGRAM TRACING: CPT

## 2020-01-27 PROCEDURE — 80048 BASIC METABOLIC PNL TOTAL CA: CPT

## 2020-01-27 PROCEDURE — 84484 ASSAY OF TROPONIN QUANT: CPT

## 2020-01-27 PROCEDURE — 85610 PROTHROMBIN TIME: CPT

## 2020-01-27 PROCEDURE — 85025 COMPLETE CBC W/AUTO DIFF WBC: CPT

## 2020-01-27 PROCEDURE — 99284 EMERGENCY DEPT VISIT MOD MDM: CPT

## 2020-01-27 PROCEDURE — 36415 COLL VENOUS BLD VENIPUNCTURE: CPT

## 2020-01-27 PROCEDURE — 71046 X-RAY EXAM CHEST 2 VIEWS: CPT

## 2020-01-27 NOTE — ED
Neurological HPI





- HPI Summary


HPI Summary: 


The patient is an 87 y/o M arriving by ambulance to [facility] with a chief 

complaint of sudden onset unilateral numbness in the face and hand tonight. He 

reports that he began having loss of sensation in the right corner of the mouth 

and ulnar aspect of the right fifth digit onset around 2000. He then proceeded 

to call his daughter, who noticed that he had delayed speech with mild dysphasia

, so she recommended that he call EMS. Approximately 10 minutes later, she 

called back and noticed that his speech improved and was back to baseline. When 

EMS arrived, the patient noticed that the numbness seemed to begin to resolve. 

In the ED, his symptoms have mostly subsided, although he notes a frontal head 

pressure. He denies any chest pain, shortness of breath, new visual changes, 

changes in hearing, urinary or bowel changes, dysuria, or burning with 

urination. He notes that he was here in December 2019 for similar symptoms, and 

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 

replacement in July 2019. He is currently on Warfarin. He notes that he last 

had his INR checked two weeks ago but is scheduled to have it checked tomorrow. 

PMHx: thyroid disease, angina, pacemaker/defibrillator, cardiac arrest, CAD, HLD

, HTN, MI, peripheral vascular disease, valvular heart disease, cardiac stents, 

CABG, aortic valve replacement, asthma, PNA, GERD. Nonsmoker, no EtOH, no 

substance use. Medications reviewed. Allergies noted.





- History of Current Complaint


Chief Complaint: EDNeurologicalDeficit


Stated Complaint: POSS TIA PER EMS


Time Seen by Provider: 01/27/20 21:27


Hx Obtained From: Patient, Family/Caretaker


Onset/Duration: Sudden Onset, Started minutes ago - 2000, Resolved


Timing: Sudden Onset


Onset Severity: Moderate


Current Severity: None


Headache Location: Frontal - pressure


Pain Intensity: 0


Pain Scale Used: 0-10 Numeric


Character: Numbness/Tingling, Impaired Speech - delayed, mildly aphasic


Aggravating: Unknown


Alleviating: Spontanious Resolution


Associated Signs and Symptoms: Positive: Headache, Impaired Speech - delayed.  

Negative: Chest Pain, Shortness of Breath


TPA Considered: No - patient's symptoms resolved


Related Hx: Anticoagulants - Warfarin





- Additional Pertinent History


Primary Care Physician: KAYCE





- Allergy/Home Medications


Allergies/Adverse Reactions: 


 Allergies











Allergy/AdvReac Type Severity Reaction Status Date / Time


 


ezetimibe [From Zetia] Allergy  Muscle Ache Verified 01/27/20 20:58


 


Beta-Blockers AdvReac  See Comment Verified 01/27/20 20:58





(Beta-Adrenergic Bloc     


 


niacin AdvReac  Flushing Verified 01/27/20 20:58


 


simvastatin [From Zocor] AdvReac  See Comment Verified 01/27/20 20:58


 


Statins-Hmg-Coa Reductase AdvReac  Muscle Ache Verified 01/27/20 20:58





Inhibitor     











Home Medications: 


 Home Medications





Biotin 5,000 mcg PO DAILY 01/27/20 [History Confirmed 01/27/20]


Fenofibrate(NF) [Tricor(NF)] 145 mg PO DAILY 01/27/20 [History Confirmed 01/27/ 20]


Furosemide TAB* [Lasix TAB*] 20 - 40 mg PO DAILY 01/27/20 [History Confirmed 01/ 27/20]


Levothyroxine TAB* [Synthroid TAB*] 100 mcg PO DAILY 01/27/20 [History 

Confirmed 01/27/20]


Melatonin 10 mg PO BEDTIME 01/27/20 [History Confirmed 01/27/20]


Polyethylene Glycol 3350* [Miralax*] 17 gm PO DAILY 01/27/20 [History Confirmed 

01/27/20]


Temazepam 7.5 mg Cap (Nf) [Temazepam] 7.5 - 15 mg PO BEDTIME 01/27/20 [History 

Confirmed 01/27/20]


guaiFENesin [Mucus Relief] 800 mg PO BID PRN 01/27/20 [History Confirmed 01/27/ 20]











PMH/Surg Hx/FS Hx/Imm Hx


Endocrine/Hematology History: Reports: Hx Anticoagulant Therapy, Hx Thyroid 

Disease - hypothyroidism


   Denies: Hx Diabetes


   Comment Only: Other Endocrine/Hematological Disorders - hypothyroidism


Cardiovascular History: Reports: Hx Angina, Hx Auto Implanted Cardiovert Defib 

- inserted 8/12/19, Hx Cardiac Arrest, Hx Coronary Artery Disease, Hx Embolism, 

Hx Hypercholesterolemia, Hx Hypertension, Hx Myocardial Infarction, Hx Pacemaker

/ICD, Hx Peripheral Vascular Disease, Hx Valvular Heart Disease - aortic 

stenosis, Other Cardiovascular Problems/Disorders - CAD, moderate aortic 

stenosis, anti-phospholipid anitbody syndrome


Respiratory History: Reports: Hx Asthma - as teen, none now, Hx Pneumonia, Hx 

Seasonal Allergies


   Denies: Hx Chronic Obstructive Pulmonary Disease (COPD)


GI History: Reports: Hx Gastroesophageal Reflux Disease, Other GI Disorders - 

hx ischemic colitis, no surgery


 History: 


   Denies: Hx Chronic Renal Failure, Hx Dialysis


Musculoskeletal History: Reports: Hx Arthritis, Hx Back Problems, Hx Orthopedic 

Injury, Hx Osteoporosis, Other Musculoskeletal History - avascular necrosis of 

left femoral head, spinal stenosis;


Sensory History: Reports: Hx Cataracts - repair both eyes, Hx Contacts or 

Glasses, Hx Vision Problem


   Denies: Hx Eye Injury, Hx Eye Prosthesis, Hx Glaucoma, Hx Macular 

Degeneration, Hx Deafness, Hx Hearing Aid, Hx Hearing Problem


Opthamlomology History: Reports: Hx Cataracts - repair both eyes, Hx Contacts 

or Glasses, Hx Vision Problem


   Denies: Hx Eye Injury, Hx Eye Prosthesis, Hx Glaucoma, Hx Macular 

Degeneration


Neurological History: Reports: Hx Transient Ischemic Attacks (TIA)


   Denies: Hx CVA, Hx Dementia, Hx Seizures


   Comment Only: Other Neuro Impairments/Disorders - multiple cardiac embolic 

strokes per Pt.


Psychiatric History: 


   Denies: Hx Panic Disorder





- Cancer History


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 

in 1989





- Surgical History


Surgical History: Yes


Surgery Procedure, Year, and Place: bilateral rotator cuff repairs, right total 

hip replacement 2012,.  cardiac stents x2, 1999, 2-vessel CABG.  bilateral 

carpal tunnel releases and trigger finger release.  open appendectomy, cataract 

surgery.  umbilical hernia repair.  AORTIC VALVE REPLACED.  LOOP RECORDER


Hx Anesthesia Reactions: No





- Immunization History


Date of Tetanus Vaccine: unk


Date of Influenza Vaccine: unk


Infectious Disease History: No


Infectious Disease History: 


   Denies: Hx Clostridium Difficile, Hx Hepatitis, Hx Human Immunodeficiency 

Virus (HIV), Hx of Known/Suspected MRSA, Hx Shingles, Hx Tuberculosis, History 

Other Infectious Disease, Traveled Outside the [place] in Last 30 Days





- Family History


Known Family History: Positive: Other - colon cancer, and perforated bowel.


   Negative: Hypertension, Diabetes





- Social History


Alcohol Use: None


Hx Substance Use: No


Substance Use Type: Reports: None


Hx Tobacco Use: No


Smoking Status (MU): Never Smoked Tobacco





Review of Systems


Negative: Other - visual changes


Negative: Chest Pain


Negative: Shortness Of Breath


Negative: Other - bowel changes


Negative: burning, dysuria, other - urinary changes


Neurological: Other - delayed speech with mild aphasia (per daughter, resolved)


Positive: Headache - frontal pressure, Numbness - right corner of mouth, right 

fifth digit


All Other Systems Reviewed And Are Negative: Yes





Physical Exam





- Summary


Physical Exam Summary: 


Constitutional: Well-developed, Well-nourished, Alert. (-) Distressed


Skin: Warm, Dry


HENT: Normocephalic; Atraumatic


Eyes: Conjunctiva normal


Neck: Musculoskeletal ROM normal neck. (-) JVD, (-) Stridor, (-) Tracheal 

deviation


Cardio: Rhythm regular, rate normal, Audible systolic murmur, Intact distal 

pulses; The pedal pulses are 2+ and symmetric. Radial pulses are 2+ and 

symmetric.


Pulmonary/Chest wall: Effort normal. (-) Respiratory distress, (-) Wheezes, (-) 

Rales


Abd: Soft, (-) tenderness, (-) Distension, (-) Guarding, (-) Rebound


Musculoskeletal: (-) Edema


Neuro: Alert, Oriented x3, No facial asymmetry subjectively nor objectively


Psych: Mood and affect Normal


Triage Information Reviewed: Yes


Vital Signs On Initial Exam: 


 Initial Vitals











Temp Pulse Resp BP Pulse Ox


 


 97.9 F   60   16   176/55   97 


 


 01/27/20 20:55  01/27/20 20:55  01/27/20 20:55  01/27/20 20:55  01/27/20 20:55











Vital Signs Reviewed: Yes





Procedures





- Sedation


Patient Received Moderate/Deep Sedation with Procedure: No





Diagnostics





- Vital Signs


 Vital Signs











  Temp Pulse Resp BP Pulse Ox


 


 01/27/20 20:55  97.9 F  60  16  176/55  97














- Laboratory


Result Diagrams: 


 01/27/20 22:15





 01/27/20 22:15


Lab Statement: Any lab studies that have been ordered have been reviewed, and 

results considered in the medical decision making process.





- Radiology


  ** CXR


Radiology Interpretation Completed By: ED Physician


Summary of Radiographic Findings: No focal consolidation. ED physician has 

reviewed and interpreted this report. Pending official read.





- EKG


  ** 2159


Cardiac Rate: NL - 60 BPM


Summary of EKG Findings: An EKG at 2159 reveals paced rhythm at 60 BPM. No 

STEMI. No acute changes. ED physician has reviewed and interpreted this EKG.





Re-Evaluation





- Re-Evaluation


  ** First Eval


Re-Evaluation Time: 21:50


Comment: Will check EKG, CXR, coags, trop





  ** Second Eval


Re-Evaluation Time: 23:10


Comment: We discussed all results and plan for discharged home with neuro f/u. 

The patient and his family are in complete agreement.





Course/Dx





- Course


Course Of Treatment: Patient is an 87 y/o male with history of multiple TIAs 

with similar presentation to tonight with right facial and right hand numbness 

resolving within 45 minutes. Denies any other symptoms including CP, SOB, 

urinary or bowel changes, or visual changes, but he notes a frontal pressure 

headache at this time. Current Warfarin use with normal INR two weeks ago, 

scheduled for re-check tomorrow. Physical exam reveals audible systolic murmur 

but is otherwise benign for any facial asymmetry or neurological deficits. I 

spoke with Dr. Bowen from the hospitalist services, and he agrees with discharge 

and placing the patient on an additional therapeutic if the patient has 

subtherapeutic INR. Labs results reveal INR to be slightly supratherapeutic at 

3.38, which is normal in the range of 2.5-3.5 for aortic valve replacement. 

Negative troponin, normal creatinine. All other abnormal lab results are not 

pertinent to current cc. An EKG at 2159 reveals paced rhythm at 60 BPM. CXR is 

negative. Since the patient is feeling well, he will be discharged home with 

follow up with his neurologist this week for considering additional therapeutic 

use. Patient understands and agrees with plan.





- Diagnoses


Provider Diagnoses: 


 TIA (transient ischemic attack)








Discharge ED





- Sign-Out/Discharge


Documenting (check all that apply): Patient Departure - Patient will be 

discharged home.





- Discharge Plan


Condition: Stable


Disposition: HOME


Patient Education Materials:  Transient Ischemic Attack (ED)


Referrals: 


Branden Turner MD [Medical Doctor] - 3 Days


Tab Sadler MD [Primary Care Provider] - 3 Days


Additional Instructions: 


Your INR was within normal limits today. Follow up with your neurologist in 2-3 

days to discuss starting an additional therapeutic medication. Return to the 

emergency department for any new or worsening symptoms.





- Billing Disposition and Condition


Condition: STABLE


Disposition: Home





- Attestation Statements


Document Initiated by Scribe: Yes


Documenting Scribe: Rebecca Banuelos


Provider For Whom Jessika is Documenting (Include Credential): Dr. Kojo Fernandez MD


Scribe Attestation: 


Rebecca GOMEZ scribed for Dr. Kojo Fernandez MD on 01/28/20 at 0636. 


Scribe Documentation Reviewed: Yes


Provider Attestation: 


The documentation as recorded by the scribe, Rebecca Banuelos accurately reflects 

the service I personally performed and the decisions made by me, Dr. Kojo Fernandez MD


Status of Jessika Document: Viewed

## 2020-01-27 NOTE — XMS REPORT
Continuity of Care Document (CCD)

 Created on:2019



Patient:Wilmer Campbell

Sex:Male

:1933

External Reference #:[id number]





Demographics







 Address  [address] [place] [address]

 

 Home Phone  [phone]

 

 Mobile Phone  [phone]

 

 Preferred Language  en

 

 Marital Status  Not  or 

 

 Protestant Affiliation  Unknown

 

 Race  White

 

 Ethnic Group  Not  or 









Author







 Name  Florencio Baker M.D.

 

 Address  [address]



   Unavailable



   [place] [address]









Care Team Providers







 Name  Role  Phone

 

 Tab Sadler MD - General Practice  Care Team Information   [phone]
[phone]









Problems







 Description

 

 No Information Available







Social History







 Type  Date  Description  Comments

 

 Birth Sex    Unknown  

 

 ETOH Use    Occasionally consumed wine in the past  

 

 Tobacco Use  Start: Unknown  Patient has never smoked  

 

 Smoking Status  Reviewed: 19  Patient has never smoked  







Allergies, Adverse Reactions, Alerts







 Active Allergies  Reaction  Severity  Comments  Date

 

 Contrast Dye      CT dye  2018

 

 Statins        2018







Medications







 Active Medications  SIG  Qnty  Indications  Ordering  Date



         Provider  

 

 Warfarin Sodium  Take 1 Tablet By      Unknown  



              5mg  Mouth Every Day        



 Tablets          



           

 

 Cephalexin  Take 1 Capsule By      Unknown  



         250mg Capsules  Mouth Three Times A        



   Day        

 

 Furosemide  Take 2 Tablets By      Unknown  



         20mg Tablets  Mouth Every Day For        



   5 Days Then        



   Decrease To Once        



   Daily        

 

 Prednisone  Take 1 Tablet By      Unknown  



         50mg Tablets  Mouth Every 6 Hours        



   as Directed        



   Starting AT 10:30        



   PM On         

 

 Amoxicillin  Take 4 Tablets By      Unknown  



          500mg Tablets  Mouth 1 Hour Before        



   Dental Procedure        

 

 Metoprolol Succinate        Unknown  



 ER          



 25mg Tablets ER 24HR          



           

 

 Praluent        Unknown  



       75mg/ml Solution          



 Auto-Inject          



           

 

 Lisinopril        Unknown  



         2.5mg Tablets          



           

 

 Neurontin        Unknown  



        300mg Capsules          



           

 

 Omeprazole        Unknown  



         20mg Capsules          



 DR          

 

 Meclizine HCL        Unknown  



            25mg          



 Tablets          



           

 

 Guaifenesin  take 1 tablet by      Unknown  



          400mg Tablets  mouth every 4 hours        



   as needed for chest        



   congestion        

 

 Ferrous Sulfate  1 by mouth every      Unknown  



              325(65Fe)  day        



 mg Tablets          



           

 

 Temazepam        Unknown  



        7.5mg Capsules          



           

 

 Levothyroxine Sodium        Unknown  



           



 112mcg Tablets          



           

 

 Aspir-Low  1 by mouth every      Unknown  



        81mg Tablets DR  day        



           







Immunizations







 Description

 

 No Information Available







Vital Signs







 Date  Vital  Result  Comment

 

 2019 10:23am  Intraocular Pressure Right Eye  13 mmHg  









 Intraocular Pressure Left Eye  12 mmHg  









 2019  3:00pm  Intraocular Pressure Right Eye  12 mmHg  









 Intraocular Pressure Left Eye  12 mmHg  







Results







 Description

 

 No Information Available







Procedures







 Date  Code  Description  Status

 

 2019  98478  Fundus Photography W/Interpretation & Report  Completed

 

 2019  30446  Ophthalmoscopy Subsequent  Completed

 

 2019  05875  Sensorimotor Examination W/Mult Measurements Ocular  
Completed



     Deviation  

 

 2019  94474  Refraction  Completed

 

 2019  02314  Eye Exam Est Comprehensive  Completed

 

 2019  18497  Fundus Photography W/Interpretation & Report  Completed

 

 2019  54996  Ophthalmoscopy Subsequent  Completed

 

 2019  48435  Eye Exam Est Intermediate  Completed







Medical Devices







 Description

 

 No Information Available







Encounters







 Description

 

 No Information Available







Assessments







 Date  Code  Description  Provider

 

 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

 

 2019  H43.813  Vitreous degeneration, bilateral  Florencio Baker M.D.

 

 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

 

 2019  Z96.1  Presence of intraocular lens  Florencio Baker M.D.

 

 2019  H43.813  Vitreous degeneration, bilateral  Florencio Baker M.D.

 

 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







Plan of Treatment

Future Appointment(s):2020  2:00 pm - Fl[facility]ncio [facility] M[facility] 
[id number] - Florencio Baker M.D.H35.373 Puckering of macula, 
zcmbmujwiS85.813 Vitreous degeneration, oaipuhvtaT01.22 Vertical strabismus, 
left eyeZ96.1 Presence of intraocular lensFollow up:1 yr



Functional Status







 Description

 

 No Information Available







Mental Status







 Description

 

 No Information Available







Referrals







 Description

 

 No Information Available

## 2020-01-28 VITALS — SYSTOLIC BLOOD PRESSURE: 169 MMHG | DIASTOLIC BLOOD PRESSURE: 73 MMHG

## 2021-03-15 NOTE — XMS REPORT
Continuity of Care Document (CCD)

 Created on:2019



Patient:Wilmer Campbell

Sex:Male

:1933

External Reference #:[id number]





Demographics







 Address  [address] [place] [address]

 

 Home Phone  [phone]

 

 Mobile Phone  [phone]

 

 Preferred Language  en

 

 Marital Status  Not  or 

 

 Episcopal Affiliation  Unknown

 

 Race  White

 

 Ethnic Group  Not  or 









Author







 Name  Florencio Baker M.D.

 

 Address  [address]



   Unavailable



   [place] [address]









Care Team Providers







 Name  Role  Phone

 

 Tab Sadler MD - General Practice  Care Team Information   [phone]
[phone]









Problems







 Description

 

 No Information Available







Social History







 Type  Date  Description  Comments

 

 Birth Sex    Unknown  

 

 ETOH Use    Occasionally consumed wine in the past  

 

 Tobacco Use  Start: Unknown  Patient has never smoked  

 

 Smoking Status  Reviewed: 19  Patient has never smoked  







Allergies, Adverse Reactions, Alerts







 Active Allergies  Reaction  Severity  Comments  Date

 

 Contrast Dye      CT dye  2018

 

 Statins        2018







Medications







 Active Medications  SIG  Qnty  Indications  Ordering  Date



         Provider  

 

 Warfarin Sodium  Take 1 Tablet By      Unknown  



              5mg  Mouth Every Day        



 Tablets          



           

 

 Cephalexin  Take 1 Capsule By      Unknown  



         250mg Capsules  Mouth Three Times A        



   Day        

 

 Furosemide  Take 2 Tablets By      Unknown  



         20mg Tablets  Mouth Every Day For        



   5 Days Then        



   Decrease To Once        



   Daily        

 

 Prednisone  Take 1 Tablet By      Unknown  



         50mg Tablets  Mouth Every 6 Hours        



   as Directed        



   Starting AT 10:30        



   PM On         

 

 Amoxicillin  Take 4 Tablets By      Unknown  



          500mg Tablets  Mouth 1 Hour Before        



   Dental Procedure        

 

 Metoprolol Succinate        Unknown  



 ER          



 25mg Tablets ER 24HR          



           

 

 Praluent        Unknown  



       75mg/ml Solution          



 Auto-Inject          



           

 

 Lisinopril        Unknown  



         2.5mg Tablets          



           

 

 Neurontin        Unknown  



        300mg Capsules          



           

 

 Omeprazole        Unknown  



         20mg Capsules          



 DR          

 

 Meclizine HCL        Unknown  



            25mg          



 Tablets          



           

 

 Guaifenesin  take 1 tablet by      Unknown  



          400mg Tablets  mouth every 4 hours        



   as needed for chest        



   congestion        

 

 Ferrous Sulfate  1 by mouth every      Unknown  



              325(65Fe)  day        



 mg Tablets          



           

 

 Temazepam        Unknown  



        7.5mg Capsules          



           

 

 Levothyroxine Sodium        Unknown  



           



 112mcg Tablets          



           







Immunizations







 Description

 

 No Information Available







Vital Signs







 Date  Vital  Result  Comment

 

 2019  3:00pm  Intraocular Pressure Right Eye  12 mmHg  









 Intraocular Pressure Left Eye  12 mmHg  









 2018 10:58am  Intraocular Pressure Right Eye  11 mmHg  









 Intraocular Pressure Left Eye  12 mmHg  







Results







 Description

 

 No Information Available







Procedures







 Date  Code  Description  Status

 

 2019  44175  Fundus Photography W/Interpretation & Report  Completed

 

 2019  13531  Ophthalmoscopy Subsequent  Completed

 

 2019  05213  Eye Exam Est Intermediate  Completed

 

 2019  79843  Oct Retina  Completed

 

 2019  62645  Eye Exam Est Intermediate  Completed







Medical Devices







 Description

 

 No Information Available







Encounters







 Description

 

 No Information Available







Assessments







 Date  Code  Description  Provider

 

 2019  H43.813  Vitreous degeneration, bilateral  Florencio Baker M.D.

 

 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







Plan of Treatment

Future Appointment(s):2019 10:00 am - Fl[facility]ncio [facility] 
[id number] - Florencio Baker M.D.H43.813 Vitreous degeneration, 
bilateralFollow up:1 month fullH35.373 Puckering of macula, bilateral



Functional Status







 Description

 

 No Information Available







Mental Status







 Description

 

 No Information Available







Referrals







 Description

 

 No Information Available 15-Mar-2021 12:04